# Patient Record
Sex: FEMALE | Race: WHITE | NOT HISPANIC OR LATINO | Employment: OTHER | URBAN - NONMETROPOLITAN AREA
[De-identification: names, ages, dates, MRNs, and addresses within clinical notes are randomized per-mention and may not be internally consistent; named-entity substitution may affect disease eponyms.]

---

## 2017-01-06 PROBLEM — F01.50 VASCULAR DEMENTIA (HCC): Status: ACTIVE | Noted: 2017-01-06

## 2017-02-15 ENCOUNTER — TRANSCRIBE ORDERS (OUTPATIENT)
Dept: ADMINISTRATIVE | Facility: HOSPITAL | Age: 72
End: 2017-02-15

## 2017-02-15 DIAGNOSIS — R13.10 DYSPHAGIA, UNSPECIFIED TYPE: Primary | ICD-10-CM

## 2017-02-17 ENCOUNTER — APPOINTMENT (OUTPATIENT)
Dept: GENERAL RADIOLOGY | Facility: HOSPITAL | Age: 72
End: 2017-02-17

## 2017-05-09 ENCOUNTER — HOSPITAL ENCOUNTER (INPATIENT)
Facility: HOSPITAL | Age: 72
LOS: 13 days | Discharge: NURSING, REHAB OR LONG-TERM CARE | End: 2017-05-22

## 2017-05-09 ENCOUNTER — APPOINTMENT (OUTPATIENT)
Dept: GENERAL RADIOLOGY | Facility: HOSPITAL | Age: 72
End: 2017-05-09

## 2017-05-09 ENCOUNTER — HOSPITAL ENCOUNTER (EMERGENCY)
Facility: HOSPITAL | Age: 72
Discharge: STILL ADMITTED OR ADMINISTRATIVE | End: 2017-05-09
Attending: EMERGENCY MEDICINE | Admitting: EMERGENCY MEDICINE

## 2017-05-09 VITALS
BODY MASS INDEX: 24.29 KG/M2 | SYSTOLIC BLOOD PRESSURE: 144 MMHG | TEMPERATURE: 98.7 F | DIASTOLIC BLOOD PRESSURE: 91 MMHG | WEIGHT: 132 LBS | RESPIRATION RATE: 18 BRPM | OXYGEN SATURATION: 100 % | HEART RATE: 74 BPM | HEIGHT: 62 IN

## 2017-05-09 DIAGNOSIS — F33.2 SEVERE EPISODE OF RECURRENT MAJOR DEPRESSIVE DISORDER, WITHOUT PSYCHOTIC FEATURES (HCC): Primary | ICD-10-CM

## 2017-05-09 LAB
6-ACETYL MORPHINE: NEGATIVE
ALBUMIN SERPL-MCNC: 3.6 G/DL (ref 3.4–4.8)
ALBUMIN/GLOB SERPL: 1.2 G/DL (ref 1.5–2.5)
ALP SERPL-CCNC: 52 U/L (ref 35–104)
ALT SERPL W P-5'-P-CCNC: 8 U/L (ref 10–36)
AMPHET+METHAMPHET UR QL: NEGATIVE
ANION GAP SERPL CALCULATED.3IONS-SCNC: 5.6 MMOL/L (ref 3.6–11.2)
AST SERPL-CCNC: 15 U/L (ref 10–30)
BACTERIA UR QL AUTO: ABNORMAL /HPF
BARBITURATES UR QL SCN: POSITIVE
BASOPHILS # BLD AUTO: 0.03 10*3/MM3 (ref 0–0.3)
BASOPHILS NFR BLD AUTO: 0.2 % (ref 0–2)
BENZODIAZ UR QL SCN: NEGATIVE
BILIRUB SERPL-MCNC: 0.2 MG/DL (ref 0.2–1.8)
BILIRUB UR QL STRIP: NEGATIVE
BUN BLD-MCNC: 13 MG/DL (ref 7–21)
BUN/CREAT SERPL: 18.6 (ref 7–25)
BUPRENORPHINE SERPL-MCNC: NEGATIVE NG/ML
CALCIUM SPEC-SCNC: 9.2 MG/DL (ref 7.7–10)
CANNABINOIDS SERPL QL: NEGATIVE
CHLORIDE SERPL-SCNC: 105 MMOL/L (ref 99–112)
CLARITY UR: ABNORMAL
CO2 SERPL-SCNC: 25.4 MMOL/L (ref 24.3–31.9)
COCAINE UR QL: NEGATIVE
COLOR UR: ABNORMAL
CREAT BLD-MCNC: 0.7 MG/DL (ref 0.43–1.29)
DEPRECATED RDW RBC AUTO: 47.5 FL (ref 37–54)
EOSINOPHIL # BLD AUTO: 0.08 10*3/MM3 (ref 0–0.7)
EOSINOPHIL NFR BLD AUTO: 0.6 % (ref 0–7)
ERYTHROCYTE [DISTWIDTH] IN BLOOD BY AUTOMATED COUNT: 14.5 % (ref 11.5–14.5)
GFR SERPL CREATININE-BSD FRML MDRD: 82 ML/MIN/1.73
GLOBULIN UR ELPH-MCNC: 3 GM/DL
GLUCOSE BLD-MCNC: 83 MG/DL (ref 70–110)
GLUCOSE UR STRIP-MCNC: NEGATIVE MG/DL
HCT VFR BLD AUTO: 38 % (ref 37–47)
HGB BLD-MCNC: 12.9 G/DL (ref 12–16)
HGB UR QL STRIP.AUTO: NEGATIVE
HYALINE CASTS UR QL AUTO: ABNORMAL /LPF
IMM GRANULOCYTES # BLD: 0.02 10*3/MM3 (ref 0–0.03)
IMM GRANULOCYTES NFR BLD: 0.2 % (ref 0–0.5)
KETONES UR QL STRIP: ABNORMAL
LEUKOCYTE ESTERASE UR QL STRIP.AUTO: ABNORMAL
LYMPHOCYTES # BLD AUTO: 5.14 10*3/MM3 (ref 1–3)
LYMPHOCYTES NFR BLD AUTO: 38.9 % (ref 16–46)
MCH RBC QN AUTO: 30.8 PG (ref 27–33)
MCHC RBC AUTO-ENTMCNC: 33.9 G/DL (ref 33–37)
MCV RBC AUTO: 90.7 FL (ref 80–94)
MDMA UR QL SCN: NEGATIVE
METHADONE UR QL SCN: NEGATIVE
MONOCYTES # BLD AUTO: 1.41 10*3/MM3 (ref 0.1–0.9)
MONOCYTES NFR BLD AUTO: 10.7 % (ref 0–12)
NEUTROPHILS # BLD AUTO: 6.53 10*3/MM3 (ref 1.4–6.5)
NEUTROPHILS NFR BLD AUTO: 49.4 % (ref 40–75)
NITRITE UR QL STRIP: NEGATIVE
OPIATES UR QL: NEGATIVE
OSMOLALITY SERPL CALC.SUM OF ELEC: 271.2 MOSM/KG (ref 273–305)
OXYCODONE UR QL SCN: NEGATIVE
PCP UR QL SCN: NEGATIVE
PH UR STRIP.AUTO: 7 [PH] (ref 5–8)
PLATELET # BLD AUTO: 412 10*3/MM3 (ref 130–400)
PMV BLD AUTO: 10.9 FL (ref 6–10)
POTASSIUM BLD-SCNC: 4.3 MMOL/L (ref 3.5–5.3)
PROT SERPL-MCNC: 6.6 G/DL (ref 6–8)
PROT UR QL STRIP: NEGATIVE
RBC # BLD AUTO: 4.19 10*6/MM3 (ref 4.2–5.4)
RBC # UR: ABNORMAL /HPF
REF LAB TEST METHOD: ABNORMAL
SODIUM BLD-SCNC: 136 MMOL/L (ref 135–153)
SP GR UR STRIP: 1.01 (ref 1–1.03)
SQUAMOUS #/AREA URNS HPF: ABNORMAL /HPF
TSH SERPL DL<=0.05 MIU/L-ACNC: 2.28 MIU/ML (ref 0.55–4.78)
UROBILINOGEN UR QL STRIP: ABNORMAL
WBC NRBC COR # BLD: 13.21 10*3/MM3 (ref 4.5–12.5)
WBC UR QL AUTO: ABNORMAL /HPF

## 2017-05-09 PROCEDURE — 71010 XR CHEST 1 VW: CPT | Performed by: RADIOLOGY

## 2017-05-09 RX ORDER — ACETAMINOPHEN 325 MG/1
500 TABLET ORAL EVERY 6 HOURS PRN
Status: DISCONTINUED | OUTPATIENT
Start: 2017-05-09 | End: 2017-05-13

## 2017-05-09 RX ORDER — AMLODIPINE BESYLATE 5 MG/1
5 TABLET ORAL DAILY
Status: DISCONTINUED | OUTPATIENT
Start: 2017-05-10 | End: 2017-05-22 | Stop reason: HOSPADM

## 2017-05-09 RX ORDER — PRIMIDONE 50 MG/1
25 TABLET ORAL 3 TIMES DAILY
Status: DISCONTINUED | OUTPATIENT
Start: 2017-05-09 | End: 2017-05-12

## 2017-05-09 RX ORDER — ONDANSETRON 4 MG/1
4 TABLET, FILM COATED ORAL EVERY 6 HOURS PRN
Status: DISCONTINUED | OUTPATIENT
Start: 2017-05-09 | End: 2017-05-22 | Stop reason: HOSPADM

## 2017-05-09 RX ORDER — MAGNESIUM HYDROXIDE/ALUMINUM HYDROXICE/SIMETHICONE 120; 1200; 1200 MG/30ML; MG/30ML; MG/30ML
30 SUSPENSION ORAL EVERY 6 HOURS PRN
Status: DISCONTINUED | OUTPATIENT
Start: 2017-05-09 | End: 2017-05-09 | Stop reason: CLARIF

## 2017-05-09 RX ORDER — BENZTROPINE MESYLATE 1 MG/1
1 TABLET ORAL DAILY PRN
Status: DISCONTINUED | OUTPATIENT
Start: 2017-05-09 | End: 2017-05-22 | Stop reason: HOSPADM

## 2017-05-09 RX ORDER — FAMOTIDINE 20 MG/1
20 TABLET, FILM COATED ORAL 2 TIMES DAILY PRN
Status: DISCONTINUED | OUTPATIENT
Start: 2017-05-09 | End: 2017-05-22 | Stop reason: HOSPADM

## 2017-05-09 RX ORDER — DOCUSATE SODIUM 100 MG/1
100 CAPSULE, LIQUID FILLED ORAL DAILY PRN
Status: ON HOLD | COMMUNITY
End: 2018-04-30

## 2017-05-09 RX ORDER — TRAZODONE HYDROCHLORIDE 50 MG/1
50 TABLET ORAL NIGHTLY PRN
Status: DISCONTINUED | OUTPATIENT
Start: 2017-05-09 | End: 2017-05-22 | Stop reason: HOSPADM

## 2017-05-09 RX ORDER — DIVALPROEX SODIUM 250 MG/1
250 TABLET, EXTENDED RELEASE ORAL EVERY 12 HOURS SCHEDULED
Status: DISCONTINUED | OUTPATIENT
Start: 2017-05-09 | End: 2017-05-12

## 2017-05-09 RX ORDER — BENZTROPINE MESYLATE 1 MG/ML
0.5 INJECTION INTRAMUSCULAR; INTRAVENOUS DAILY PRN
Status: DISCONTINUED | OUTPATIENT
Start: 2017-05-09 | End: 2017-05-22 | Stop reason: HOSPADM

## 2017-05-09 RX ORDER — NITROFURANTOIN MACROCRYSTALS 100 MG/1
100 CAPSULE ORAL 2 TIMES DAILY
COMMUNITY
Start: 2017-05-05 | End: 2017-05-22 | Stop reason: HOSPADM

## 2017-05-09 RX ORDER — LOPERAMIDE HYDROCHLORIDE 2 MG/1
2 CAPSULE ORAL 4 TIMES DAILY PRN
Status: DISCONTINUED | OUTPATIENT
Start: 2017-05-09 | End: 2017-05-22 | Stop reason: HOSPADM

## 2017-05-09 RX ORDER — HYDROXYZINE 50 MG/1
50 TABLET, FILM COATED ORAL EVERY 6 HOURS PRN
Status: DISCONTINUED | OUTPATIENT
Start: 2017-05-09 | End: 2017-05-22 | Stop reason: HOSPADM

## 2017-05-09 RX ORDER — MULTIVITAMIN
1 TABLET ORAL DAILY
Status: DISCONTINUED | OUTPATIENT
Start: 2017-05-09 | End: 2017-05-22 | Stop reason: HOSPADM

## 2017-05-09 RX ORDER — LORAZEPAM 1 MG/1
1 TABLET ORAL NIGHTLY
Status: DISCONTINUED | OUTPATIENT
Start: 2017-05-09 | End: 2017-05-13

## 2017-05-09 RX ORDER — LORAZEPAM 1 MG/1
1 TABLET ORAL NIGHTLY
Status: CANCELLED | OUTPATIENT
Start: 2017-05-09

## 2017-05-09 RX ORDER — LORAZEPAM 0.5 MG/1
0.5 TABLET ORAL 2 TIMES DAILY
COMMUNITY
End: 2017-12-12 | Stop reason: SDUPTHER

## 2017-05-09 RX ORDER — ACETAMINOPHEN 500 MG
500 TABLET ORAL EVERY 6 HOURS PRN
COMMUNITY
End: 2018-05-10 | Stop reason: HOSPADM

## 2017-05-09 RX ORDER — MEGESTROL ACETATE 40 MG/ML
400 SUSPENSION ORAL 2 TIMES DAILY
Status: ON HOLD | COMMUNITY
End: 2018-04-30

## 2017-05-09 RX ORDER — IBUPROFEN 400 MG/1
600 TABLET ORAL EVERY 6 HOURS PRN
Status: DISCONTINUED | OUTPATIENT
Start: 2017-05-09 | End: 2017-05-22 | Stop reason: HOSPADM

## 2017-05-09 RX ORDER — PRIMIDONE 50 MG/1
25 TABLET ORAL 3 TIMES DAILY
COMMUNITY
End: 2017-05-22 | Stop reason: HOSPADM

## 2017-05-09 RX ORDER — BENZONATATE 100 MG/1
100 CAPSULE ORAL 3 TIMES DAILY PRN
Status: DISCONTINUED | OUTPATIENT
Start: 2017-05-09 | End: 2017-05-22 | Stop reason: HOSPADM

## 2017-05-09 RX ORDER — LORAZEPAM 0.5 MG/1
0.5 TABLET ORAL 2 TIMES DAILY
Status: DISCONTINUED | OUTPATIENT
Start: 2017-05-10 | End: 2017-05-13

## 2017-05-09 RX ORDER — ALUMINA, MAGNESIA, AND SIMETHICONE 2400; 2400; 240 MG/30ML; MG/30ML; MG/30ML
15 SUSPENSION ORAL EVERY 6 HOURS PRN
Status: DISCONTINUED | OUTPATIENT
Start: 2017-05-09 | End: 2017-05-22 | Stop reason: HOSPADM

## 2017-05-09 RX ORDER — ASPIRIN 81 MG/1
81 TABLET, CHEWABLE ORAL DAILY
Status: ON HOLD | COMMUNITY
End: 2018-04-30

## 2017-05-09 RX ORDER — ASPIRIN 81 MG/1
81 TABLET, CHEWABLE ORAL DAILY
Status: DISCONTINUED | OUTPATIENT
Start: 2017-05-10 | End: 2017-05-22 | Stop reason: HOSPADM

## 2017-05-09 RX ORDER — NITROFURANTOIN MACROCRYSTALS 50 MG/1
100 CAPSULE ORAL 2 TIMES DAILY
Status: DISCONTINUED | OUTPATIENT
Start: 2017-05-09 | End: 2017-05-18

## 2017-05-09 RX ORDER — DONEPEZIL HYDROCHLORIDE 5 MG/1
10 TABLET, FILM COATED ORAL DAILY
Status: DISCONTINUED | OUTPATIENT
Start: 2017-05-10 | End: 2017-05-22 | Stop reason: HOSPADM

## 2017-05-09 RX ORDER — NITROFURANTOIN MACROCRYSTALS 50 MG/1
100 CAPSULE ORAL 2 TIMES DAILY
Status: CANCELLED | OUTPATIENT
Start: 2017-05-09 | End: 2017-05-12

## 2017-05-09 RX ORDER — LORAZEPAM 1 MG/1
1 TABLET ORAL NIGHTLY
COMMUNITY
End: 2017-12-12 | Stop reason: SDUPTHER

## 2017-05-09 RX ORDER — ECHINACEA PURPUREA EXTRACT 125 MG
2 TABLET ORAL AS NEEDED
Status: DISCONTINUED | OUTPATIENT
Start: 2017-05-09 | End: 2017-05-22 | Stop reason: HOSPADM

## 2017-05-09 RX ORDER — MEGESTROL ACETATE 40 MG/ML
400 SUSPENSION ORAL EVERY 12 HOURS SCHEDULED
Status: DISCONTINUED | OUTPATIENT
Start: 2017-05-09 | End: 2017-05-22 | Stop reason: HOSPADM

## 2017-05-09 RX ORDER — DONEPEZIL HYDROCHLORIDE 10 MG/1
10 TABLET, FILM COATED ORAL DAILY
Status: ON HOLD | COMMUNITY
Start: 2017-05-10 | End: 2018-04-30

## 2017-05-09 RX ORDER — DIVALPROEX SODIUM 250 MG/1
250 TABLET, EXTENDED RELEASE ORAL 2 TIMES DAILY
COMMUNITY
End: 2017-05-22 | Stop reason: HOSPADM

## 2017-05-09 RX ORDER — DOCUSATE SODIUM 100 MG/1
100 CAPSULE, LIQUID FILLED ORAL DAILY PRN
Status: DISCONTINUED | OUTPATIENT
Start: 2017-05-09 | End: 2017-05-22 | Stop reason: HOSPADM

## 2017-05-09 RX ORDER — DIVALPROEX SODIUM 250 MG/1
250 TABLET, DELAYED RELEASE ORAL 2 TIMES DAILY
Status: ON HOLD | COMMUNITY
End: 2017-05-09

## 2017-05-09 RX ADMIN — POLYVINYL ALCOHOL, POVIDONE 2 DROP: 14; 6 SOLUTION/ DROPS OPHTHALMIC at 20:17

## 2017-05-09 RX ADMIN — NITROFURANTOIN MACROCRYSTALS 100 MG: 50 CAPSULE ORAL at 17:23

## 2017-05-10 PROCEDURE — 99223 1ST HOSP IP/OBS HIGH 75: CPT | Performed by: PSYCHIATRY & NEUROLOGY

## 2017-05-10 PROCEDURE — 25010000002 LORAZEPAM PER 2 MG: Performed by: PSYCHIATRY & NEUROLOGY

## 2017-05-10 RX ORDER — LORAZEPAM 2 MG/ML
1 INJECTION INTRAMUSCULAR ONCE
Status: COMPLETED | OUTPATIENT
Start: 2017-05-10 | End: 2017-05-10

## 2017-05-10 RX ADMIN — LORAZEPAM 1 MG: 2 INJECTION INTRAMUSCULAR; INTRAVENOUS at 13:14

## 2017-05-10 RX ADMIN — LORAZEPAM 1 MG: 1 TABLET ORAL at 22:35

## 2017-05-10 RX ADMIN — SODIUM CHLORIDE 500 ML: 9 INJECTION, SOLUTION INTRAVENOUS at 15:00

## 2017-05-11 LAB — BACTERIA SPEC AEROBE CULT: NORMAL

## 2017-05-11 PROCEDURE — 99231 SBSQ HOSP IP/OBS SF/LOW 25: CPT | Performed by: PSYCHIATRY & NEUROLOGY

## 2017-05-11 RX ADMIN — DIVALPROEX SODIUM 250 MG: 250 TABLET, FILM COATED, EXTENDED RELEASE ORAL at 09:01

## 2017-05-11 RX ADMIN — APIXABAN 5 MG: 5 TABLET, FILM COATED ORAL at 21:04

## 2017-05-11 RX ADMIN — AMLODIPINE BESYLATE 5 MG: 5 TABLET ORAL at 07:22

## 2017-05-11 RX ADMIN — LORAZEPAM 0.5 MG: 0.5 TABLET ORAL at 07:22

## 2017-05-11 RX ADMIN — NITROFURANTOIN MACROCRYSTALS 100 MG: 50 CAPSULE ORAL at 09:01

## 2017-05-11 RX ADMIN — POLYVINYL ALCOHOL, POVIDONE 2 DROP: 14; 6 SOLUTION/ DROPS OPHTHALMIC at 21:04

## 2017-05-11 RX ADMIN — APIXABAN 5 MG: 5 TABLET, FILM COATED ORAL at 09:01

## 2017-05-11 RX ADMIN — LORAZEPAM 0.5 MG: 0.5 TABLET ORAL at 12:21

## 2017-05-11 RX ADMIN — DIVALPROEX SODIUM 250 MG: 250 TABLET, FILM COATED, EXTENDED RELEASE ORAL at 21:04

## 2017-05-11 RX ADMIN — PRIMIDONE 25 MG: 50 TABLET ORAL at 16:41

## 2017-05-11 RX ADMIN — PRIMIDONE 25 MG: 50 TABLET ORAL at 21:04

## 2017-05-11 RX ADMIN — LORAZEPAM 1 MG: 1 TABLET ORAL at 21:05

## 2017-05-11 RX ADMIN — PRIMIDONE 25 MG: 50 TABLET ORAL at 09:00

## 2017-05-11 RX ADMIN — MEGESTROL ACETATE 400 MG: 40 SUSPENSION ORAL at 21:04

## 2017-05-11 RX ADMIN — NITROFURANTOIN MACROCRYSTALS 100 MG: 50 CAPSULE ORAL at 17:48

## 2017-05-11 RX ADMIN — POLYVINYL ALCOHOL, POVIDONE 2 DROP: 14; 6 SOLUTION/ DROPS OPHTHALMIC at 16:40

## 2017-05-12 PROCEDURE — 99232 SBSQ HOSP IP/OBS MODERATE 35: CPT | Performed by: PSYCHIATRY & NEUROLOGY

## 2017-05-12 RX ORDER — PRIMIDONE 50 MG/1
25 TABLET ORAL NIGHTLY
Status: DISCONTINUED | OUTPATIENT
Start: 2017-05-12 | End: 2017-05-16

## 2017-05-12 RX ORDER — DIVALPROEX SODIUM 250 MG/1
250 TABLET, EXTENDED RELEASE ORAL NIGHTLY
Status: DISCONTINUED | OUTPATIENT
Start: 2017-05-12 | End: 2017-05-15

## 2017-05-12 RX ADMIN — POLYVINYL ALCOHOL, POVIDONE 2 DROP: 14; 6 SOLUTION/ DROPS OPHTHALMIC at 08:23

## 2017-05-12 RX ADMIN — MEGESTROL ACETATE 400 MG: 40 SUSPENSION ORAL at 08:21

## 2017-05-12 RX ADMIN — POLYVINYL ALCOHOL, POVIDONE 2 DROP: 14; 6 SOLUTION/ DROPS OPHTHALMIC at 15:15

## 2017-05-12 RX ADMIN — Medication 1 TABLET: at 08:23

## 2017-05-12 RX ADMIN — APIXABAN 5 MG: 5 TABLET, FILM COATED ORAL at 08:23

## 2017-05-12 RX ADMIN — LORAZEPAM 0.5 MG: 0.5 TABLET ORAL at 12:53

## 2017-05-12 RX ADMIN — DONEPEZIL HYDROCHLORIDE 10 MG: 5 TABLET, FILM COATED ORAL at 08:23

## 2017-05-12 RX ADMIN — AMLODIPINE BESYLATE 5 MG: 5 TABLET ORAL at 08:23

## 2017-05-12 RX ADMIN — PRIMIDONE 25 MG: 50 TABLET ORAL at 08:22

## 2017-05-12 RX ADMIN — ACETAMINOPHEN 487.5 MG: 325 TABLET, FILM COATED ORAL at 08:21

## 2017-05-12 RX ADMIN — NITROFURANTOIN MACROCRYSTALS 100 MG: 50 CAPSULE ORAL at 17:16

## 2017-05-12 RX ADMIN — ASPIRIN 81 MG: 81 TABLET, CHEWABLE ORAL at 08:23

## 2017-05-12 RX ADMIN — NITROFURANTOIN MACROCRYSTALS 100 MG: 50 CAPSULE ORAL at 08:22

## 2017-05-12 RX ADMIN — LORAZEPAM 0.5 MG: 0.5 TABLET ORAL at 08:22

## 2017-05-13 LAB
6-ACETYL MORPHINE: NEGATIVE
ALBUMIN SERPL-MCNC: 4 G/DL (ref 3.4–4.8)
ALBUMIN/GLOB SERPL: 1.2 G/DL (ref 1.5–2.5)
ALP SERPL-CCNC: 54 U/L (ref 35–104)
ALT SERPL W P-5'-P-CCNC: 12 U/L (ref 10–36)
AMPHET+METHAMPHET UR QL: NEGATIVE
ANION GAP SERPL CALCULATED.3IONS-SCNC: 11.5 MMOL/L (ref 3.6–11.2)
AST SERPL-CCNC: 18 U/L (ref 10–30)
BACTERIA UR QL AUTO: ABNORMAL /HPF
BARBITURATES UR QL SCN: POSITIVE
BASOPHILS # BLD AUTO: 0.05 10*3/MM3 (ref 0–0.3)
BASOPHILS NFR BLD AUTO: 0.4 % (ref 0–2)
BENZODIAZ UR QL SCN: NEGATIVE
BILIRUB SERPL-MCNC: 0.6 MG/DL (ref 0.2–1.8)
BILIRUB UR QL STRIP: NEGATIVE
BUN BLD-MCNC: 19 MG/DL (ref 7–21)
BUN/CREAT SERPL: 27.5 (ref 7–25)
BUPRENORPHINE SERPL-MCNC: NEGATIVE NG/ML
CALCIUM SPEC-SCNC: 9.6 MG/DL (ref 7.7–10)
CANNABINOIDS SERPL QL: NEGATIVE
CHLORIDE SERPL-SCNC: 103 MMOL/L (ref 99–112)
CK MB SERPL-CCNC: 0.59 NG/ML (ref 0–5)
CK MB SERPL-RTO: 0.4 % (ref 0–3)
CK SERPL-CCNC: 136 U/L (ref 24–173)
CLARITY UR: CLEAR
CO2 SERPL-SCNC: 21.5 MMOL/L (ref 24.3–31.9)
COCAINE UR QL: NEGATIVE
COLOR UR: ABNORMAL
CREAT BLD-MCNC: 0.69 MG/DL (ref 0.43–1.29)
CRP SERPL-MCNC: <0.5 MG/DL (ref 0–0.99)
D-LACTATE SERPL-SCNC: 1.4 MMOL/L (ref 0.5–2)
DEPRECATED RDW RBC AUTO: 46.4 FL (ref 37–54)
EOSINOPHIL # BLD AUTO: 0.04 10*3/MM3 (ref 0–0.7)
EOSINOPHIL NFR BLD AUTO: 0.4 % (ref 0–7)
ERYTHROCYTE [DISTWIDTH] IN BLOOD BY AUTOMATED COUNT: 14.4 % (ref 11.5–14.5)
GFR SERPL CREATININE-BSD FRML MDRD: 84 ML/MIN/1.73
GLOBULIN UR ELPH-MCNC: 3.3 GM/DL
GLUCOSE BLD-MCNC: 84 MG/DL (ref 70–110)
GLUCOSE UR STRIP-MCNC: NEGATIVE MG/DL
HCT VFR BLD AUTO: 41 % (ref 37–47)
HGB BLD-MCNC: 13.9 G/DL (ref 12–16)
HGB UR QL STRIP.AUTO: ABNORMAL
HYALINE CASTS UR QL AUTO: ABNORMAL /LPF
IMM GRANULOCYTES # BLD: 0.01 10*3/MM3 (ref 0–0.03)
IMM GRANULOCYTES NFR BLD: 0.1 % (ref 0–0.5)
KETONES UR QL STRIP: ABNORMAL
LEUKOCYTE ESTERASE UR QL STRIP.AUTO: ABNORMAL
LYMPHOCYTES # BLD AUTO: 4.18 10*3/MM3 (ref 1–3)
LYMPHOCYTES NFR BLD AUTO: 36.6 % (ref 16–46)
MAGNESIUM SERPL-MCNC: 2.1 MG/DL (ref 1.7–2.6)
MCH RBC QN AUTO: 30.8 PG (ref 27–33)
MCHC RBC AUTO-ENTMCNC: 33.9 G/DL (ref 33–37)
MCV RBC AUTO: 90.7 FL (ref 80–94)
MDMA UR QL SCN: NEGATIVE
METHADONE UR QL SCN: NEGATIVE
MONOCYTES # BLD AUTO: 1.53 10*3/MM3 (ref 0.1–0.9)
MONOCYTES NFR BLD AUTO: 13.4 % (ref 0–12)
MYOGLOBIN SERPL-MCNC: 43 NG/ML (ref 0–109)
NEUTROPHILS # BLD AUTO: 5.6 10*3/MM3 (ref 1.4–6.5)
NEUTROPHILS NFR BLD AUTO: 49.1 % (ref 40–75)
NITRITE UR QL STRIP: NEGATIVE
OPIATES UR QL: NEGATIVE
OSMOLALITY SERPL CALC.SUM OF ELEC: 273.4 MOSM/KG (ref 273–305)
OXYCODONE UR QL SCN: NEGATIVE
PCP UR QL SCN: NEGATIVE
PH UR STRIP.AUTO: 5.5 [PH] (ref 5–8)
PHOSPHATE SERPL-MCNC: 3.2 MG/DL (ref 2.7–4.5)
PLATELET # BLD AUTO: 373 10*3/MM3 (ref 130–400)
PMV BLD AUTO: 11 FL (ref 6–10)
POTASSIUM BLD-SCNC: 3.7 MMOL/L (ref 3.5–5.3)
PROT SERPL-MCNC: 7.3 G/DL (ref 6–8)
PROT UR QL STRIP: NEGATIVE
RBC # BLD AUTO: 4.52 10*6/MM3 (ref 4.2–5.4)
RBC # UR: ABNORMAL /HPF
REF LAB TEST METHOD: ABNORMAL
SODIUM BLD-SCNC: 136 MMOL/L (ref 135–153)
SP GR UR STRIP: 1.02 (ref 1–1.03)
SQUAMOUS #/AREA URNS HPF: ABNORMAL /HPF
TROPONIN I SERPL-MCNC: <0.006 NG/ML
UROBILINOGEN UR QL STRIP: ABNORMAL
WBC NRBC COR # BLD: 11.41 10*3/MM3 (ref 4.5–12.5)
WBC UR QL AUTO: ABNORMAL /HPF

## 2017-05-13 PROCEDURE — 80307 DRUG TEST PRSMV CHEM ANLYZR: CPT | Performed by: PHYSICIAN ASSISTANT

## 2017-05-13 PROCEDURE — 85025 COMPLETE CBC W/AUTO DIFF WBC: CPT | Performed by: INTERNAL MEDICINE

## 2017-05-13 PROCEDURE — 99223 1ST HOSP IP/OBS HIGH 75: CPT | Performed by: INTERNAL MEDICINE

## 2017-05-13 PROCEDURE — 87040 BLOOD CULTURE FOR BACTERIA: CPT | Performed by: INTERNAL MEDICINE

## 2017-05-13 PROCEDURE — 25010000002 LORAZEPAM PER 2 MG: Performed by: PSYCHIATRY & NEUROLOGY

## 2017-05-13 PROCEDURE — 83605 ASSAY OF LACTIC ACID: CPT | Performed by: INTERNAL MEDICINE

## 2017-05-13 PROCEDURE — 82550 ASSAY OF CK (CPK): CPT | Performed by: INTERNAL MEDICINE

## 2017-05-13 PROCEDURE — 82553 CREATINE MB FRACTION: CPT | Performed by: INTERNAL MEDICINE

## 2017-05-13 PROCEDURE — 93010 ELECTROCARDIOGRAM REPORT: CPT | Performed by: INTERNAL MEDICINE

## 2017-05-13 PROCEDURE — 81001 URINALYSIS AUTO W/SCOPE: CPT | Performed by: PSYCHIATRY & NEUROLOGY

## 2017-05-13 PROCEDURE — 84100 ASSAY OF PHOSPHORUS: CPT | Performed by: INTERNAL MEDICINE

## 2017-05-13 PROCEDURE — 86140 C-REACTIVE PROTEIN: CPT | Performed by: INTERNAL MEDICINE

## 2017-05-13 PROCEDURE — 83874 ASSAY OF MYOGLOBIN: CPT | Performed by: INTERNAL MEDICINE

## 2017-05-13 PROCEDURE — 99232 SBSQ HOSP IP/OBS MODERATE 35: CPT | Performed by: PSYCHIATRY & NEUROLOGY

## 2017-05-13 PROCEDURE — 84484 ASSAY OF TROPONIN QUANT: CPT | Performed by: INTERNAL MEDICINE

## 2017-05-13 PROCEDURE — 71010 HC CHEST PA OR AP: CPT

## 2017-05-13 PROCEDURE — 83735 ASSAY OF MAGNESIUM: CPT | Performed by: INTERNAL MEDICINE

## 2017-05-13 PROCEDURE — 80053 COMPREHEN METABOLIC PANEL: CPT | Performed by: INTERNAL MEDICINE

## 2017-05-13 PROCEDURE — 93005 ELECTROCARDIOGRAM TRACING: CPT | Performed by: INTERNAL MEDICINE

## 2017-05-13 PROCEDURE — 87086 URINE CULTURE/COLONY COUNT: CPT | Performed by: INTERNAL MEDICINE

## 2017-05-13 RX ORDER — LORAZEPAM 2 MG/ML
1 INJECTION INTRAMUSCULAR 3 TIMES DAILY
Status: DISCONTINUED | OUTPATIENT
Start: 2017-05-13 | End: 2017-05-16

## 2017-05-13 RX ORDER — ACETAMINOPHEN 325 MG/1
650 TABLET ORAL EVERY 6 HOURS PRN
Status: DISCONTINUED | OUTPATIENT
Start: 2017-05-13 | End: 2017-05-22 | Stop reason: HOSPADM

## 2017-05-13 RX ADMIN — POLYVINYL ALCOHOL, POVIDONE 2 DROP: 14; 6 SOLUTION/ DROPS OPHTHALMIC at 17:23

## 2017-05-13 RX ADMIN — LORAZEPAM 1 MG: 2 INJECTION INTRAMUSCULAR; INTRAVENOUS at 17:25

## 2017-05-13 RX ADMIN — SODIUM CHLORIDE 500 ML: 9 INJECTION, SOLUTION INTRAVENOUS at 12:00

## 2017-05-13 RX ADMIN — LORAZEPAM 0.5 MG: 0.5 TABLET ORAL at 07:53

## 2017-05-13 RX ADMIN — LORAZEPAM 1 MG: 2 INJECTION INTRAMUSCULAR; INTRAVENOUS at 11:28

## 2017-05-13 RX ADMIN — LORAZEPAM 1 MG: 2 INJECTION INTRAMUSCULAR; INTRAVENOUS at 22:32

## 2017-05-14 LAB
ALBUMIN SERPL-MCNC: 3.4 G/DL (ref 3.4–4.8)
ALBUMIN/GLOB SERPL: 1.1 G/DL (ref 1.5–2.5)
ALP SERPL-CCNC: 44 U/L (ref 35–104)
ALT SERPL W P-5'-P-CCNC: 11 U/L (ref 10–36)
ANION GAP SERPL CALCULATED.3IONS-SCNC: 11.7 MMOL/L (ref 3.6–11.2)
AST SERPL-CCNC: 16 U/L (ref 10–30)
BASOPHILS # BLD AUTO: 0.06 10*3/MM3 (ref 0–0.3)
BASOPHILS NFR BLD AUTO: 0.6 % (ref 0–2)
BILIRUB SERPL-MCNC: 0.4 MG/DL (ref 0.2–1.8)
BUN BLD-MCNC: 17 MG/DL (ref 7–21)
BUN/CREAT SERPL: 26.6 (ref 7–25)
CALCIUM SPEC-SCNC: 9 MG/DL (ref 7.7–10)
CHLORIDE SERPL-SCNC: 107 MMOL/L (ref 99–112)
CO2 SERPL-SCNC: 22.3 MMOL/L (ref 24.3–31.9)
CREAT BLD-MCNC: 0.64 MG/DL (ref 0.43–1.29)
DEPRECATED RDW RBC AUTO: 47.2 FL (ref 37–54)
EOSINOPHIL # BLD AUTO: 0.09 10*3/MM3 (ref 0–0.7)
EOSINOPHIL NFR BLD AUTO: 1 % (ref 0–7)
ERYTHROCYTE [DISTWIDTH] IN BLOOD BY AUTOMATED COUNT: 14.5 % (ref 11.5–14.5)
FOLATE SERPL-MCNC: 13.75 NG/ML (ref 5.4–20)
GFR SERPL CREATININE-BSD FRML MDRD: 91 ML/MIN/1.73
GLOBULIN UR ELPH-MCNC: 3 GM/DL
GLUCOSE BLD-MCNC: 72 MG/DL (ref 70–110)
HCT VFR BLD AUTO: 37.8 % (ref 37–47)
HGB BLD-MCNC: 12.5 G/DL (ref 12–16)
IMM GRANULOCYTES # BLD: 0 10*3/MM3 (ref 0–0.03)
IMM GRANULOCYTES NFR BLD: 0 % (ref 0–0.5)
LYMPHOCYTES # BLD AUTO: 4.75 10*3/MM3 (ref 1–3)
LYMPHOCYTES NFR BLD AUTO: 51.1 % (ref 16–46)
MAGNESIUM SERPL-MCNC: 2.2 MG/DL (ref 1.7–2.6)
MCH RBC QN AUTO: 30.6 PG (ref 27–33)
MCHC RBC AUTO-ENTMCNC: 33.1 G/DL (ref 33–37)
MCV RBC AUTO: 92.4 FL (ref 80–94)
MONOCYTES # BLD AUTO: 1.17 10*3/MM3 (ref 0.1–0.9)
MONOCYTES NFR BLD AUTO: 12.6 % (ref 0–12)
NEUTROPHILS # BLD AUTO: 3.22 10*3/MM3 (ref 1.4–6.5)
NEUTROPHILS NFR BLD AUTO: 34.7 % (ref 40–75)
OSMOLALITY SERPL CALC.SUM OF ELEC: 281.3 MOSM/KG (ref 273–305)
PHOSPHATE SERPL-MCNC: 3.3 MG/DL (ref 2.7–4.5)
PLATELET # BLD AUTO: 318 10*3/MM3 (ref 130–400)
PMV BLD AUTO: 11.6 FL (ref 6–10)
POTASSIUM BLD-SCNC: 3.7 MMOL/L (ref 3.5–5.3)
PROT SERPL-MCNC: 6.4 G/DL (ref 6–8)
RBC # BLD AUTO: 4.09 10*6/MM3 (ref 4.2–5.4)
SODIUM BLD-SCNC: 141 MMOL/L (ref 135–153)
VIT B12 BLD-MCNC: 1211 PG/ML (ref 211–911)
WBC NRBC COR # BLD: 9.29 10*3/MM3 (ref 4.5–12.5)

## 2017-05-14 PROCEDURE — 85025 COMPLETE CBC W/AUTO DIFF WBC: CPT | Performed by: PHYSICIAN ASSISTANT

## 2017-05-14 PROCEDURE — 80053 COMPREHEN METABOLIC PANEL: CPT | Performed by: PHYSICIAN ASSISTANT

## 2017-05-14 PROCEDURE — 84100 ASSAY OF PHOSPHORUS: CPT | Performed by: INTERNAL MEDICINE

## 2017-05-14 PROCEDURE — 82607 VITAMIN B-12: CPT | Performed by: INTERNAL MEDICINE

## 2017-05-14 PROCEDURE — 82746 ASSAY OF FOLIC ACID SERUM: CPT | Performed by: INTERNAL MEDICINE

## 2017-05-14 PROCEDURE — 83735 ASSAY OF MAGNESIUM: CPT | Performed by: INTERNAL MEDICINE

## 2017-05-14 PROCEDURE — 25010000002 LORAZEPAM PER 2 MG: Performed by: PSYCHIATRY & NEUROLOGY

## 2017-05-14 PROCEDURE — 99232 SBSQ HOSP IP/OBS MODERATE 35: CPT | Performed by: PSYCHIATRY & NEUROLOGY

## 2017-05-14 RX ADMIN — LORAZEPAM 1 MG: 2 INJECTION INTRAMUSCULAR; INTRAVENOUS at 10:00

## 2017-05-14 RX ADMIN — LORAZEPAM 1 MG: 2 INJECTION INTRAMUSCULAR; INTRAVENOUS at 21:11

## 2017-05-14 RX ADMIN — POLYVINYL ALCOHOL, POVIDONE 2 DROP: 14; 6 SOLUTION/ DROPS OPHTHALMIC at 15:00

## 2017-05-14 RX ADMIN — AMLODIPINE BESYLATE 5 MG: 5 TABLET ORAL at 17:47

## 2017-05-14 RX ADMIN — LORAZEPAM 1 MG: 2 INJECTION INTRAMUSCULAR; INTRAVENOUS at 16:00

## 2017-05-14 RX ADMIN — APIXABAN 5 MG: 5 TABLET, FILM COATED ORAL at 20:38

## 2017-05-14 RX ADMIN — PRIMIDONE 25 MG: 50 TABLET ORAL at 20:38

## 2017-05-14 RX ADMIN — NITROFURANTOIN MACROCRYSTALS 100 MG: 50 CAPSULE ORAL at 17:46

## 2017-05-14 RX ADMIN — DIVALPROEX SODIUM 250 MG: 250 TABLET, FILM COATED, EXTENDED RELEASE ORAL at 20:38

## 2017-05-14 RX ADMIN — MEGESTROL ACETATE 400 MG: 40 SUSPENSION ORAL at 20:38

## 2017-05-15 PROCEDURE — 99232 SBSQ HOSP IP/OBS MODERATE 35: CPT | Performed by: PSYCHIATRY & NEUROLOGY

## 2017-05-15 PROCEDURE — 25010000002 LORAZEPAM PER 2 MG: Performed by: PSYCHIATRY & NEUROLOGY

## 2017-05-15 RX ADMIN — MEGESTROL ACETATE 400 MG: 40 SUSPENSION ORAL at 09:59

## 2017-05-15 RX ADMIN — POLYVINYL ALCOHOL, POVIDONE 2 DROP: 14; 6 SOLUTION/ DROPS OPHTHALMIC at 16:37

## 2017-05-15 RX ADMIN — NITROFURANTOIN MACROCRYSTALS 100 MG: 50 CAPSULE ORAL at 09:44

## 2017-05-15 RX ADMIN — POLYVINYL ALCOHOL, POVIDONE 2 DROP: 14; 6 SOLUTION/ DROPS OPHTHALMIC at 09:59

## 2017-05-15 RX ADMIN — AMLODIPINE BESYLATE 5 MG: 5 TABLET ORAL at 09:44

## 2017-05-15 RX ADMIN — DONEPEZIL HYDROCHLORIDE 10 MG: 5 TABLET, FILM COATED ORAL at 09:44

## 2017-05-15 RX ADMIN — Medication 1 TABLET: at 09:44

## 2017-05-15 RX ADMIN — LORAZEPAM 1 MG: 2 INJECTION INTRAMUSCULAR; INTRAVENOUS at 09:44

## 2017-05-15 RX ADMIN — APIXABAN 5 MG: 5 TABLET, FILM COATED ORAL at 09:44

## 2017-05-15 RX ADMIN — ASPIRIN 81 MG: 81 TABLET, CHEWABLE ORAL at 09:44

## 2017-05-15 RX ADMIN — LORAZEPAM 1 MG: 2 INJECTION INTRAMUSCULAR; INTRAVENOUS at 20:29

## 2017-05-15 RX ADMIN — LORAZEPAM 1 MG: 2 INJECTION INTRAMUSCULAR; INTRAVENOUS at 16:37

## 2017-05-15 RX ADMIN — POLYVINYL ALCOHOL, POVIDONE 2 DROP: 14; 6 SOLUTION/ DROPS OPHTHALMIC at 20:30

## 2017-05-16 LAB — BACTERIA SPEC AEROBE CULT: NORMAL

## 2017-05-16 PROCEDURE — 25010000002 LORAZEPAM PER 2 MG: Performed by: PSYCHIATRY & NEUROLOGY

## 2017-05-16 RX ORDER — LORAZEPAM 2 MG/ML
1 INJECTION INTRAMUSCULAR EVERY 12 HOURS PRN
Status: DISCONTINUED | OUTPATIENT
Start: 2017-05-16 | End: 2017-05-22 | Stop reason: HOSPADM

## 2017-05-16 RX ORDER — LORAZEPAM 1 MG/1
1 TABLET ORAL EVERY 12 HOURS SCHEDULED
Status: DISCONTINUED | OUTPATIENT
Start: 2017-05-16 | End: 2017-05-22 | Stop reason: HOSPADM

## 2017-05-16 RX ADMIN — LORAZEPAM 1 MG: 2 INJECTION INTRAMUSCULAR; INTRAVENOUS at 09:25

## 2017-05-17 PROCEDURE — 25010000002 LORAZEPAM PER 2 MG: Performed by: PSYCHIATRY & NEUROLOGY

## 2017-05-17 PROCEDURE — 99231 SBSQ HOSP IP/OBS SF/LOW 25: CPT | Performed by: PSYCHIATRY & NEUROLOGY

## 2017-05-17 RX ADMIN — POLYVINYL ALCOHOL, POVIDONE 2 DROP: 14; 6 SOLUTION/ DROPS OPHTHALMIC at 09:02

## 2017-05-17 RX ADMIN — LORAZEPAM 1 MG: 2 INJECTION INTRAMUSCULAR; INTRAVENOUS at 02:06

## 2017-05-17 RX ADMIN — ACETAMINOPHEN 650 MG: 325 TABLET, FILM COATED ORAL at 17:48

## 2017-05-17 RX ADMIN — NITROFURANTOIN MACROCRYSTALS 100 MG: 50 CAPSULE ORAL at 09:03

## 2017-05-17 RX ADMIN — LORAZEPAM 1 MG: 1 TABLET ORAL at 09:06

## 2017-05-18 LAB
ALBUMIN SERPL-MCNC: 3.7 G/DL (ref 3.4–4.8)
ALBUMIN/GLOB SERPL: 1.3 G/DL (ref 1.5–2.5)
ALP SERPL-CCNC: 50 U/L (ref 35–104)
ALT SERPL W P-5'-P-CCNC: 9 U/L (ref 10–36)
ANION GAP SERPL CALCULATED.3IONS-SCNC: 6.3 MMOL/L (ref 3.6–11.2)
AST SERPL-CCNC: 13 U/L (ref 10–30)
BACTERIA SPEC AEROBE CULT: NORMAL
BACTERIA SPEC AEROBE CULT: NORMAL
BASOPHILS # BLD AUTO: 0.04 10*3/MM3 (ref 0–0.3)
BASOPHILS NFR BLD AUTO: 0.4 % (ref 0–2)
BILIRUB SERPL-MCNC: 0.5 MG/DL (ref 0.2–1.8)
BUN BLD-MCNC: 23 MG/DL (ref 7–21)
BUN/CREAT SERPL: 30.7 (ref 7–25)
CALCIUM SPEC-SCNC: 9.5 MG/DL (ref 7.7–10)
CHLORIDE SERPL-SCNC: 106 MMOL/L (ref 99–112)
CO2 SERPL-SCNC: 27.7 MMOL/L (ref 24.3–31.9)
CREAT BLD-MCNC: 0.75 MG/DL (ref 0.43–1.29)
DEPRECATED RDW RBC AUTO: 46.8 FL (ref 37–54)
EOSINOPHIL # BLD AUTO: 0.02 10*3/MM3 (ref 0–0.7)
EOSINOPHIL NFR BLD AUTO: 0.2 % (ref 0–7)
ERYTHROCYTE [DISTWIDTH] IN BLOOD BY AUTOMATED COUNT: 14.3 % (ref 11.5–14.5)
GFR SERPL CREATININE-BSD FRML MDRD: 76 ML/MIN/1.73
GLOBULIN UR ELPH-MCNC: 2.8 GM/DL
GLUCOSE BLD-MCNC: 76 MG/DL (ref 70–110)
HCT VFR BLD AUTO: 38.8 % (ref 37–47)
HGB BLD-MCNC: 13.2 G/DL (ref 12–16)
IMM GRANULOCYTES # BLD: 0.02 10*3/MM3 (ref 0–0.03)
IMM GRANULOCYTES NFR BLD: 0.2 % (ref 0–0.5)
LYMPHOCYTES # BLD AUTO: 2.94 10*3/MM3 (ref 1–3)
LYMPHOCYTES NFR BLD AUTO: 31.1 % (ref 16–46)
MCH RBC QN AUTO: 30.8 PG (ref 27–33)
MCHC RBC AUTO-ENTMCNC: 34 G/DL (ref 33–37)
MCV RBC AUTO: 90.7 FL (ref 80–94)
MONOCYTES # BLD AUTO: 1.08 10*3/MM3 (ref 0.1–0.9)
MONOCYTES NFR BLD AUTO: 11.4 % (ref 0–12)
NEUTROPHILS # BLD AUTO: 5.36 10*3/MM3 (ref 1.4–6.5)
NEUTROPHILS NFR BLD AUTO: 56.7 % (ref 40–75)
OSMOLALITY SERPL CALC.SUM OF ELEC: 281.8 MOSM/KG (ref 273–305)
PLATELET # BLD AUTO: 260 10*3/MM3 (ref 130–400)
PMV BLD AUTO: 11.3 FL (ref 6–10)
POTASSIUM BLD-SCNC: 4 MMOL/L (ref 3.5–5.3)
PROT SERPL-MCNC: 6.5 G/DL (ref 6–8)
RBC # BLD AUTO: 4.28 10*6/MM3 (ref 4.2–5.4)
SODIUM BLD-SCNC: 140 MMOL/L (ref 135–153)
WBC NRBC COR # BLD: 9.46 10*3/MM3 (ref 4.5–12.5)

## 2017-05-18 PROCEDURE — 99232 SBSQ HOSP IP/OBS MODERATE 35: CPT | Performed by: PSYCHIATRY & NEUROLOGY

## 2017-05-18 PROCEDURE — 25010000002 LORAZEPAM PER 2 MG: Performed by: PSYCHIATRY & NEUROLOGY

## 2017-05-18 PROCEDURE — 80053 COMPREHEN METABOLIC PANEL: CPT | Performed by: PSYCHIATRY & NEUROLOGY

## 2017-05-18 PROCEDURE — 99222 1ST HOSP IP/OBS MODERATE 55: CPT | Performed by: INTERNAL MEDICINE

## 2017-05-18 PROCEDURE — 85025 COMPLETE CBC W/AUTO DIFF WBC: CPT | Performed by: PSYCHIATRY & NEUROLOGY

## 2017-05-18 RX ADMIN — LORAZEPAM 1 MG: 2 INJECTION INTRAMUSCULAR; INTRAVENOUS at 10:14

## 2017-05-18 RX ADMIN — SODIUM CHLORIDE 500 ML: 9 INJECTION, SOLUTION INTRAVENOUS at 17:02

## 2017-05-19 LAB
BACTERIA UR QL AUTO: ABNORMAL /HPF
BILIRUB UR QL STRIP: NEGATIVE
CLARITY UR: ABNORMAL
COLOR UR: ABNORMAL
GLUCOSE UR STRIP-MCNC: NEGATIVE MG/DL
HGB UR QL STRIP.AUTO: NEGATIVE
HYALINE CASTS UR QL AUTO: ABNORMAL /LPF
KETONES UR QL STRIP: ABNORMAL
LEUKOCYTE ESTERASE UR QL STRIP.AUTO: ABNORMAL
NITRITE UR QL STRIP: NEGATIVE
PH UR STRIP.AUTO: 5.5 [PH] (ref 5–8)
PROT UR QL STRIP: NEGATIVE
RBC # UR: ABNORMAL /HPF
REF LAB TEST METHOD: ABNORMAL
SP GR UR STRIP: 1.02 (ref 1–1.03)
SQUAMOUS #/AREA URNS HPF: ABNORMAL /HPF
UROBILINOGEN UR QL STRIP: ABNORMAL
WBC UR QL AUTO: ABNORMAL /HPF

## 2017-05-19 PROCEDURE — G8978 MOBILITY CURRENT STATUS: HCPCS

## 2017-05-19 PROCEDURE — 25010000002 LORAZEPAM PER 2 MG: Performed by: PSYCHIATRY & NEUROLOGY

## 2017-05-19 PROCEDURE — 97530 THERAPEUTIC ACTIVITIES: CPT

## 2017-05-19 PROCEDURE — 97162 PT EVAL MOD COMPLEX 30 MIN: CPT

## 2017-05-19 PROCEDURE — 87086 URINE CULTURE/COLONY COUNT: CPT | Performed by: PSYCHIATRY & NEUROLOGY

## 2017-05-19 PROCEDURE — 71010 HC CHEST AP: CPT

## 2017-05-19 PROCEDURE — 87077 CULTURE AEROBIC IDENTIFY: CPT | Performed by: PSYCHIATRY & NEUROLOGY

## 2017-05-19 PROCEDURE — 99231 SBSQ HOSP IP/OBS SF/LOW 25: CPT | Performed by: PSYCHIATRY & NEUROLOGY

## 2017-05-19 PROCEDURE — G8979 MOBILITY GOAL STATUS: HCPCS

## 2017-05-19 PROCEDURE — 81001 URINALYSIS AUTO W/SCOPE: CPT | Performed by: PSYCHIATRY & NEUROLOGY

## 2017-05-19 PROCEDURE — 97116 GAIT TRAINING THERAPY: CPT

## 2017-05-19 RX ADMIN — POLYVINYL ALCOHOL, POVIDONE 2 DROP: 14; 6 SOLUTION/ DROPS OPHTHALMIC at 20:51

## 2017-05-19 RX ADMIN — LORAZEPAM 1 MG: 2 INJECTION INTRAMUSCULAR; INTRAVENOUS at 20:51

## 2017-05-19 RX ADMIN — LORAZEPAM 1 MG: 1 TABLET ORAL at 08:52

## 2017-05-20 PROCEDURE — 99232 SBSQ HOSP IP/OBS MODERATE 35: CPT

## 2017-05-20 PROCEDURE — 25010000002 LORAZEPAM PER 2 MG: Performed by: PSYCHIATRY & NEUROLOGY

## 2017-05-20 RX ADMIN — LORAZEPAM 1 MG: 2 INJECTION INTRAMUSCULAR; INTRAVENOUS at 08:55

## 2017-05-20 RX ADMIN — ACETAMINOPHEN 650 MG: 325 TABLET, FILM COATED ORAL at 10:37

## 2017-05-20 RX ADMIN — LORAZEPAM 1 MG: 1 TABLET ORAL at 20:35

## 2017-05-20 RX ADMIN — POLYVINYL ALCOHOL, POVIDONE 2 DROP: 14; 6 SOLUTION/ DROPS OPHTHALMIC at 17:16

## 2017-05-20 RX ADMIN — POLYVINYL ALCOHOL, POVIDONE 2 DROP: 14; 6 SOLUTION/ DROPS OPHTHALMIC at 20:35

## 2017-05-21 PROCEDURE — 99232 SBSQ HOSP IP/OBS MODERATE 35: CPT

## 2017-05-21 PROCEDURE — 25010000002 LORAZEPAM PER 2 MG: Performed by: PSYCHIATRY & NEUROLOGY

## 2017-05-21 RX ADMIN — LORAZEPAM 1 MG: 2 INJECTION INTRAMUSCULAR; INTRAVENOUS at 21:52

## 2017-05-21 RX ADMIN — ONDANSETRON 4 MG: 4 TABLET, FILM COATED ORAL at 12:43

## 2017-05-21 RX ADMIN — LORAZEPAM 1 MG: 2 INJECTION INTRAMUSCULAR; INTRAVENOUS at 09:30

## 2017-05-22 VITALS
DIASTOLIC BLOOD PRESSURE: 75 MMHG | TEMPERATURE: 97 F | WEIGHT: 124 LBS | HEIGHT: 58 IN | SYSTOLIC BLOOD PRESSURE: 128 MMHG | BODY MASS INDEX: 26.03 KG/M2 | RESPIRATION RATE: 18 BRPM | OXYGEN SATURATION: 98 % | HEART RATE: 72 BPM

## 2017-05-22 LAB — BACTERIA SPEC AEROBE CULT: NORMAL

## 2017-05-22 PROCEDURE — 97530 THERAPEUTIC ACTIVITIES: CPT

## 2017-05-22 PROCEDURE — G8978 MOBILITY CURRENT STATUS: HCPCS

## 2017-05-22 PROCEDURE — 25010000002 LORAZEPAM PER 2 MG: Performed by: PSYCHIATRY & NEUROLOGY

## 2017-05-22 PROCEDURE — 99222 1ST HOSP IP/OBS MODERATE 55: CPT | Performed by: PSYCHIATRY & NEUROLOGY

## 2017-05-22 PROCEDURE — G8980 MOBILITY D/C STATUS: HCPCS

## 2017-05-22 PROCEDURE — 99238 HOSP IP/OBS DSCHRG MGMT 30/<: CPT | Performed by: PSYCHIATRY & NEUROLOGY

## 2017-05-22 PROCEDURE — 97116 GAIT TRAINING THERAPY: CPT

## 2017-05-22 RX ADMIN — LORAZEPAM 1 MG: 2 INJECTION INTRAMUSCULAR; INTRAVENOUS at 09:15

## 2017-05-26 ENCOUNTER — OFFICE VISIT (OUTPATIENT)
Dept: PSYCHIATRY | Facility: CLINIC | Age: 72
End: 2017-05-26

## 2017-05-26 VITALS
SYSTOLIC BLOOD PRESSURE: 119 MMHG | HEIGHT: 58 IN | BODY MASS INDEX: 25.82 KG/M2 | HEART RATE: 100 BPM | DIASTOLIC BLOOD PRESSURE: 86 MMHG | WEIGHT: 123 LBS

## 2017-05-26 DIAGNOSIS — F01.518 VASCULAR DEMENTIA WITH BEHAVIOR DISTURBANCE (HCC): ICD-10-CM

## 2017-05-26 DIAGNOSIS — F33.2 SEVERE EPISODE OF RECURRENT MAJOR DEPRESSIVE DISORDER, WITHOUT PSYCHOTIC FEATURES (HCC): Primary | ICD-10-CM

## 2017-05-26 PROCEDURE — 90792 PSYCH DIAG EVAL W/MED SRVCS: CPT | Performed by: PSYCHIATRY & NEUROLOGY

## 2017-05-26 RX ORDER — BUPROPION HYDROCHLORIDE 75 MG/1
75 TABLET ORAL 2 TIMES DAILY
Qty: 60 TABLET | Status: SHIPPED | OUTPATIENT
Start: 2017-05-26 | End: 2017-08-08 | Stop reason: DRUGHIGH

## 2017-05-30 DIAGNOSIS — F01.518 VASCULAR DEMENTIA WITH BEHAVIOR DISTURBANCE (HCC): Primary | ICD-10-CM

## 2017-06-05 ENCOUNTER — TRANSCRIBE ORDERS (OUTPATIENT)
Dept: ADMINISTRATIVE | Facility: HOSPITAL | Age: 72
End: 2017-06-05

## 2017-06-05 ENCOUNTER — HOSPITAL ENCOUNTER (OUTPATIENT)
Dept: CARDIOLOGY | Facility: HOSPITAL | Age: 72
Discharge: HOME | End: 2017-06-05
Admitting: FAMILY MEDICINE

## 2017-06-05 ENCOUNTER — HOSPITAL ENCOUNTER (OUTPATIENT)
Dept: MRI IMAGING | Facility: HOSPITAL | Age: 72
Discharge: HOME | End: 2017-06-05
Attending: PSYCHIATRY & NEUROLOGY

## 2017-06-05 ENCOUNTER — HOSPITAL ENCOUNTER (OUTPATIENT)
Dept: CT IMAGING | Facility: HOSPITAL | Age: 72
Discharge: HOME | End: 2017-06-05

## 2017-06-05 DIAGNOSIS — R10.2 PELVIC PAIN: ICD-10-CM

## 2017-06-05 DIAGNOSIS — F01.518 VASCULAR DEMENTIA WITH BEHAVIOR DISTURBANCE (HCC): ICD-10-CM

## 2017-06-05 DIAGNOSIS — Z86.718 H/O BLOOD CLOTS: ICD-10-CM

## 2017-06-05 DIAGNOSIS — M79.89 LEG SWELLING: Primary | ICD-10-CM

## 2017-06-05 DIAGNOSIS — R10.9 ABDOMINAL PAIN, UNSPECIFIED LOCATION: ICD-10-CM

## 2017-06-05 DIAGNOSIS — M79.89 LEG SWELLING: ICD-10-CM

## 2017-06-05 PROCEDURE — 0 IOPAMIDOL 61 % SOLUTION: Performed by: FAMILY MEDICINE

## 2017-06-05 PROCEDURE — 74177 CT ABD & PELVIS W/CONTRAST: CPT

## 2017-06-05 PROCEDURE — 93971 EXTREMITY STUDY: CPT

## 2017-06-05 PROCEDURE — 74177 CT ABD & PELVIS W/CONTRAST: CPT | Performed by: RADIOLOGY

## 2017-06-05 PROCEDURE — 93971 EXTREMITY STUDY: CPT | Performed by: RADIOLOGY

## 2017-06-05 RX ADMIN — IOPAMIDOL 80 ML: 612 INJECTION, SOLUTION INTRAVENOUS at 12:15

## 2017-06-08 ENCOUNTER — HOSPITAL ENCOUNTER (OUTPATIENT)
Dept: MRI IMAGING | Facility: HOSPITAL | Age: 72
Discharge: HOME | End: 2017-06-08
Attending: PSYCHIATRY & NEUROLOGY

## 2017-06-08 ENCOUNTER — APPOINTMENT (OUTPATIENT)
Dept: CT IMAGING | Facility: HOSPITAL | Age: 72
End: 2017-06-08

## 2017-08-08 ENCOUNTER — OFFICE VISIT (OUTPATIENT)
Dept: PSYCHIATRY | Facility: CLINIC | Age: 72
End: 2017-08-08

## 2017-08-08 VITALS
BODY MASS INDEX: 28.13 KG/M2 | HEART RATE: 95 BPM | SYSTOLIC BLOOD PRESSURE: 120 MMHG | HEIGHT: 58 IN | WEIGHT: 134 LBS | DIASTOLIC BLOOD PRESSURE: 78 MMHG

## 2017-08-08 DIAGNOSIS — F41.1 GAD (GENERALIZED ANXIETY DISORDER): ICD-10-CM

## 2017-08-08 DIAGNOSIS — F33.2 SEVERE EPISODE OF RECURRENT MAJOR DEPRESSIVE DISORDER, WITHOUT PSYCHOTIC FEATURES (HCC): Primary | ICD-10-CM

## 2017-08-08 PROCEDURE — 99214 OFFICE O/P EST MOD 30 MIN: CPT | Performed by: PSYCHIATRY & NEUROLOGY

## 2017-08-08 RX ORDER — BUPROPION HYDROCHLORIDE 100 MG/1
100 TABLET, EXTENDED RELEASE ORAL 2 TIMES DAILY
Qty: 60 TABLET | Refills: 2 | Status: SHIPPED | OUTPATIENT
Start: 2017-08-08 | End: 2017-08-08 | Stop reason: SDUPTHER

## 2017-08-08 RX ORDER — ARIPIPRAZOLE 2 MG/1
2 TABLET ORAL DAILY
Qty: 30 TABLET | Refills: 0 | Status: SHIPPED | OUTPATIENT
Start: 2017-08-08 | End: 2017-12-12

## 2017-08-08 RX ORDER — BUPROPION HYDROCHLORIDE 100 MG/1
100 TABLET, EXTENDED RELEASE ORAL 2 TIMES DAILY
Qty: 60 TABLET | Refills: 2 | Status: SHIPPED | OUTPATIENT
Start: 2017-08-08 | End: 2017-12-12

## 2017-08-08 NOTE — PROGRESS NOTES
"Patient ID: Lacie Win is a 72 y.o. female    SERVICE TYPE: EVALUATION AND MANAGEMENT (greater than 50% of the time spent for supportive psychotherapy).  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     HPI: Rates her depression moderate severe, \"have hope\", restless sleep. \"Need more things to do\".   Depression improved. Dressing herself and tending to hygiene. Low grade paranoia coupled anxiety.   Memory is intact for recent events.   \"Don't trust my own judgement, probably couldn't live independently,but I want too\".   Could live with her son and his son at the home place when able.     Panic prohibited the MRI, need to fine an open MRI, but will hold on that for now. 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     So will slightly increase the Wellbutrin to 100 mg of the Wellbutrin SR twice a day, adding in Abilify 2 mg daily (no history of allergy checked), and continue the lorazepam 0.5 milligrams twice a day and 1 mg at bedtime.    PFSH: Residing at the [facility], copy of this note will be given to the patient to take to the nursing home.    Review of Systems   Respiratory: Positive for chest tightness and shortness of breath.    Cardiovascular: Negative.    Gastrointestinal:        Dysphagia (needs EGD)    Family to have patient's primary care physician arrange for consultation with gastroenterologist.  DVT both LE's that's clear since back on Eliquis.    SUPPORTIVE PSYCHOTHERAPY: continuing efforts to promote the therapeutic alliance, address the patient’s issues, and strengthen self awareness, insights, and coping skills.       Mental Status Exam  Appearance:  clean and casually dressed, " appropriate  Attitude toward clinician:  cooperative and agreeable   Speech:    Rate:  regular rate and rhythm   Volume: normal  Motor:  no abnormal movements present  Mood:  Mildly depressed  Affect:  Mildly restricted  Thought Processes:  linear, logical, and goal directed  Thought Content:  Normal   Feeling Hopeless: absent  Suicidal Thoughts:  absent  Homicidal Thoughts:  absent  Perceptual Disturbance: no perceptual disturbance  Attention and Concentration:  good  Insight and Judgement:  good  Memory:  memory appears to be intact for recent and remote events    LABS:   Lab Results (last 7 days)     ** No results found for the last 168 hours. **          MEDICATION ISSUES: Have discussed with the patient the medications Risks, Benefits, and Side effects including potential falls, possible impaired driving and  metabolic adversities among others. No medication side effects or related complaints today.     TREATMENT PLAN/GOALS: Continue supportive psychotherapy efforts and medications as indicated.     Current Outpatient Prescriptions   Medication Sig Dispense Refill   • acetaminophen (TYLENOL) 500 MG tablet Take 500 mg by mouth Every 6 (Six) Hours As Needed for Mild Pain (1-3).     • amLODIPine (NORVASC) 5 MG tablet Take 5 mg by mouth Daily.     • apixaban (ELIQUIS) 5 MG tablet tablet Take 5 mg by mouth 2 (Two) Times a Day.     • aspirin 81 MG chewable tablet Chew 81 mg Daily.     • docusate sodium (COLACE) 100 MG capsule Take 100 mg by mouth Daily As Needed for Constipation.     •       • LORazepam (ATIVAN) 0.5 MG tablet Take 0.5 mg by mouth 2 (Two) Times a Day. Q8AM & QNOON; 1 mg HS     • LORazepam (ATIVAN) 1 MG tablet Take 1 mg by mouth Every Night.     • magnesium hydroxide (MILK OF MAGNESIA) 2400 MG/10ML suspension suspension Take 30 mL by mouth Daily As Needed.     • megestrol (MEGACE) 40 MG/ML suspension Take 400 mg by mouth 2 (Two) Times a Day.     • polyethyl glycol-propyl glycol (SYSTANE) 0.4-0.3 %  solution ophthalmic solution Administer 2 drops to both eyes 3 (Three) Times a Day.     • ARIPiprazole (ABILIFY) 2 MG tablet Take 1 tablet by mouth Daily. 30 tablet 0   • buPROPion SR (WELLBUTRIN SR) 100 MG 12 hr tablet Take 1 tablet by mouth 2 (Two) Times a Day. 60 tablet 2     No current facility-administered medications for this visit.        COLLATERAL PSYCHOTHERAPEUTIC INTERVENTION: patient not interested in additional psychotherapy.     Encounter Diagnoses   Name Primary?   • Severe episode of recurrent major depressive disorder, without psychotic features Yes   • GERMAN (generalized anxiety disorder)        Return in about 4 weeks (around 9/5/2017).  Patient knows to call if symptoms worsen or fail to improve between appointments.

## 2017-10-09 ENCOUNTER — TELEPHONE (OUTPATIENT)
Dept: PSYCHIATRY | Facility: CLINIC | Age: 72
End: 2017-10-09

## 2017-10-09 NOTE — TELEPHONE ENCOUNTER
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 Still fearful. Visits home day from nursing home. \"Still won't do for herself\". Self depreciatory. Missed appointment in September. Has appointment November 1. Will stop the Abilify and continue the Wellbutrin, call prn.   "

## 2017-12-12 ENCOUNTER — OFFICE VISIT (OUTPATIENT)
Dept: PSYCHIATRY | Facility: CLINIC | Age: 72
End: 2017-12-12

## 2017-12-12 VITALS
BODY MASS INDEX: 29.39 KG/M2 | HEIGHT: 58 IN | DIASTOLIC BLOOD PRESSURE: 86 MMHG | SYSTOLIC BLOOD PRESSURE: 132 MMHG | HEART RATE: 103 BPM | WEIGHT: 140 LBS

## 2017-12-12 DIAGNOSIS — F41.1 GAD (GENERALIZED ANXIETY DISORDER): ICD-10-CM

## 2017-12-12 DIAGNOSIS — F33.2 SEVERE EPISODE OF RECURRENT MAJOR DEPRESSIVE DISORDER, WITHOUT PSYCHOTIC FEATURES (HCC): Primary | ICD-10-CM

## 2017-12-12 PROCEDURE — 99214 OFFICE O/P EST MOD 30 MIN: CPT | Performed by: PSYCHIATRY & NEUROLOGY

## 2017-12-12 RX ORDER — BUPROPION HYDROCHLORIDE 150 MG/1
150 TABLET, EXTENDED RELEASE ORAL 2 TIMES DAILY
Qty: 60 TABLET | Refills: 0 | Status: SHIPPED | OUTPATIENT
Start: 2017-12-12 | End: 2018-02-20

## 2017-12-12 RX ORDER — LORAZEPAM 1 MG/1
1 TABLET ORAL NIGHTLY
Qty: 30 TABLET | Refills: 0 | Status: SHIPPED | OUTPATIENT
Start: 2017-12-12 | End: 2018-02-20 | Stop reason: SDUPTHER

## 2017-12-12 RX ORDER — LORAZEPAM 0.5 MG/1
0.5 TABLET ORAL 2 TIMES DAILY
Qty: 60 TABLET | Refills: 0 | Status: SHIPPED | OUTPATIENT
Start: 2017-12-12 | End: 2018-02-20 | Stop reason: SDUPTHER

## 2017-12-12 NOTE — PROGRESS NOTES
"Patient ID: Lacie Win is a 72 y.o. female    SERVICE TYPE: EVALUATION AND MANAGEMENT (greater than 50% of the time spent for supportive psychotherapy).  Time in: 1405    Time out: 1445    /86  Pulse 103  Ht 147.3 cm (58\")  Wt 63.5 kg (140 lb)  BMI 29.26 kg/m2    ALLERGIES:  Ciprofloxacin; Lamotrigine; Penicillins; Risperidone and related; Seroquel [quetiapine fumarate]; Sulfa antibiotics; and Zoloft [sertraline hcl]    CC: \"Kind of scarred\"    FOLLOWED FOR:Depression/ possible earily progressive dementia.     HPI: Not doing any of her self care, says she can't even while doing it (even while walking, eating). Memory is good, certainly no progressive decline. Tries to isolate in the nursing home, \"get scarred around people\". Sleeps \"to much\".  Enjoys \"being around the kids\". Daughter believes the patient is depressed sighting: negative, anxious, won't go to [employer] (like the real person I was isn't there\"), \"hope there's hope, been a long time\" almost a year now. \"Seems like I just can't get back with my family, like I'm away from them and can't get back\". \"sometime wish god would take me but not going to hurt myself\".     Need that Cranial MRI. Then possibly consider ECT? Referral made to the [facility] open [facility].     PFSH: still at the [facility] out most everyday with family. Requiring because she won't tend to necessities.     Review of Systems   Respiratory: Negative.    Cardiovascular: Negative.    Gastrointestinal: Negative.    Neurological: Negative.        SUPPORTIVE PSYCHOTHERAPY: continuing efforts to promote the therapeutic alliance, address the patient’s issues, and strengthen self awareness, insights, and coping skills.  Thoughts that she is unable to even do basic functions is almost to a delusional extent. Daughter requesting individual counseling.      Mental Status Exam  Appearance:  clean and casually dressed, appropriate  Attitude toward clinician:  cooperative and " agreeable   Speech:    Rate:  regular rate and rhythm   Volume: normal  Motor:  no abnormal movements present  Mood:  Anxious  Affect:  Euthymic cross sectionally.   Thought Processes:  linear, logical, and goal directed  Thought Content:  Normal   Feeling Hopeless: absent  Suicidal Thoughts:  absent  Homicidal Thoughts:  absent  Perceptual Disturbance: no perceptual disturbance  Attention and Concentration:  good  Insight and Judgement:  good  Memory:  memory appears to be intact    LABS: No results found for this or any previous visit (from the past 168 hour(s)).    MEDICATION ISSUES: Have discussed with the patient the medications Risks, Benefits, and Side effects including potential falls, possible impaired driving and  metabolic adversities among others. No medication side effects or related complaints today.     TREATMENT PLAN/GOALS: Continue supportive psychotherapy efforts and medications as indicated.     Current Outpatient Prescriptions   Medication Sig Dispense Refill   • acetaminophen (TYLENOL) 500 MG tablet Take 500 mg by mouth Every 6 (Six) Hours As Needed for Mild Pain (1-3).     • amLODIPine (NORVASC) 5 MG tablet Take 5 mg by mouth Daily.     • apixaban (ELIQUIS) 5 MG tablet tablet Take 5 mg by mouth 2 (Two) Times a Day.     • aspirin 81 MG chewable tablet Chew 81 mg Daily.     • buPROPion SR (WELLBUTRIN SR) 150 MG 12 hr tablet Take 1 tablet by mouth 2 (Two) Times a Day. 60 tablet 0   • docusate sodium (COLACE) 100 MG capsule Take 100 mg by mouth Daily As Needed for Constipation.     • donepezil (ARICEPT) 10 MG tablet Take 10 mg by mouth Daily.     • LORazepam (ATIVAN) 0.5 MG tablet Take 1 tablet by mouth 2 (Two) Times a Day. Q8AM & QNOON; 1 mg HS 60 tablet 0   • LORazepam (ATIVAN) 1 MG tablet Take 1 tablet by mouth Every Night. 30 tablet 0   • magnesium hydroxide (MILK OF MAGNESIA) 2400 MG/10ML suspension suspension Take 30 mL by mouth Daily As Needed.     • megestrol (MEGACE) 40 MG/ML suspension  Take 400 mg by mouth 2 (Two) Times a Day.     • polyethyl glycol-propyl glycol (SYSTANE) 0.4-0.3 % solution ophthalmic solution Administer 2 drops to both eyes 3 (Three) Times a Day.       No current facility-administered medications for this visit.        COLLATERAL PSYCHOTHERAPEUTIC INTERVENTION: patient not interested in additional psychotherapy.     Encounter Diagnoses   Name Primary?   • Severe episode of recurrent major depressive disorder, without psychotic features Yes   • GERMAN (generalized anxiety disorder)        Return in about 4 weeks (around 1/9/2018).  Patient knows to call if symptoms worsen or fail to improve between appointments.

## 2017-12-13 ENCOUNTER — TELEPHONE (OUTPATIENT)
Dept: PSYCHIATRY | Facility: CLINIC | Age: 72
End: 2017-12-13

## 2017-12-13 ENCOUNTER — DOCUMENTATION (OUTPATIENT)
Dept: PSYCHIATRY | Facility: CLINIC | Age: 72
End: 2017-12-13

## 2017-12-13 NOTE — TELEPHONE ENCOUNTER
Katherine from [facility] inregards of the Ativan states she was on taking Ativan 0.5 Mg taking 1 8 am 1 noon and 1 at 4pm and then 1 MG at bed times, States the script you sent in yesterday you had writing the script for 1 tab at 8 am and 1 tab at noon and then 1 MG at bed time. States she is needing to know if you dont want her taking the Ativan at 4 PM anymore? Please advise

## 2017-12-13 NOTE — PROGRESS NOTES
Patient needs to have a comprehensive metabolic panel to facilitate the MRI study with contrast.  Call order for a CMP to the [facility] ([phone]).  Advised him to fax results per instructions of the family to the [facility].

## 2017-12-15 ENCOUNTER — TELEPHONE (OUTPATIENT)
Dept: PSYCHIATRY | Facility: CLINIC | Age: 72
End: 2017-12-15

## 2017-12-15 NOTE — TELEPHONE ENCOUNTER
Talk to the [facility] telling them that the nursing home was post to have the chemistry studies done which included creatinine on December 13.

## 2018-01-31 ENCOUNTER — TELEPHONE (OUTPATIENT)
Dept: PSYCHIATRY | Facility: CLINIC | Age: 73
End: 2018-01-31

## 2018-02-01 ENCOUNTER — DOCUMENTATION (OUTPATIENT)
Dept: PSYCHIATRY | Facility: CLINIC | Age: 73
End: 2018-02-01

## 2018-02-01 NOTE — PROGRESS NOTES
Patient's daughter Mary Ellen Fallon ([phone]) called wanting to discuss the results of the patient's MRI scan done January 25, 2018 at the [facility].  Report scanned into the chart.  Advised the daughter that the MRI results did not really clarify the issue of dementia versus pseudodementia related to depression, certainly cleared the issue about the absence of any intracranial ischemia hemorrhage or tumor.  Will discuss treatment options with the patient's appointment February 20.  This was a noncontrast study.

## 2018-02-20 ENCOUNTER — TELEPHONE (OUTPATIENT)
Dept: PSYCHIATRY | Facility: CLINIC | Age: 73
End: 2018-02-20

## 2018-02-20 ENCOUNTER — OFFICE VISIT (OUTPATIENT)
Dept: PSYCHIATRY | Facility: CLINIC | Age: 73
End: 2018-02-20

## 2018-02-20 VITALS
SYSTOLIC BLOOD PRESSURE: 139 MMHG | BODY MASS INDEX: 27.92 KG/M2 | HEIGHT: 58 IN | DIASTOLIC BLOOD PRESSURE: 89 MMHG | WEIGHT: 133 LBS | HEART RATE: 90 BPM

## 2018-02-20 DIAGNOSIS — Z79.899 MEDICATION MANAGEMENT: Primary | ICD-10-CM

## 2018-02-20 DIAGNOSIS — F33.2 SEVERE EPISODE OF RECURRENT MAJOR DEPRESSIVE DISORDER, WITHOUT PSYCHOTIC FEATURES (HCC): ICD-10-CM

## 2018-02-20 LAB
AMPHETAMINE CUT-OFF: 1000
BENZODIAZIPINE CUT-OFF: 300
BUPRENORPHINE CUT-OFF: 10
COCAINE CUT-OFF: 300
EXTERNAL AMPHETAMINE SCREEN URINE: NEGATIVE
EXTERNAL BENZODIAZEPINE SCREEN URINE: POSITIVE
EXTERNAL BUPRENORPHINE SCREEN URINE: NEGATIVE
EXTERNAL COCAINE SCREEN URINE: NEGATIVE
EXTERNAL MDMA: NEGATIVE
EXTERNAL METHADONE SCREEN URINE: NEGATIVE
EXTERNAL METHAMPHETAMINE SCREEN URINE: NEGATIVE
EXTERNAL OPIATES SCREEN URINE: NEGATIVE
EXTERNAL OXYCODONE SCREEN URINE: NEGATIVE
EXTERNAL THC SCREEN URINE: NEGATIVE
MDMA CUT-OFF: 500
METHADONE CUT-OFF: 300
METHAMPHETAMINE CUT-OFF: 1000
OPIATES CUT-OFF: 300
OXYCODONE CUT-OFF: 100
THC CUT-OFF: 50

## 2018-02-20 PROCEDURE — 99214 OFFICE O/P EST MOD 30 MIN: CPT | Performed by: PSYCHIATRY & NEUROLOGY

## 2018-02-20 RX ORDER — LORAZEPAM 1 MG/1
1 TABLET ORAL NIGHTLY
Qty: 30 TABLET | Refills: 0 | Status: SHIPPED | OUTPATIENT
Start: 2018-02-20 | End: 2018-11-20 | Stop reason: SDUPTHER

## 2018-02-20 RX ORDER — LORAZEPAM 0.5 MG/1
0.5 TABLET ORAL 3 TIMES DAILY
Qty: 60 TABLET | Refills: 0 | Status: ON HOLD | OUTPATIENT
Start: 2018-02-20 | End: 2018-04-30

## 2018-02-20 RX ORDER — VENLAFAXINE 37.5 MG/1
37.5 TABLET ORAL 2 TIMES DAILY
Qty: 60 TABLET | Refills: 2 | Status: ON HOLD | OUTPATIENT
Start: 2018-02-20 | End: 2018-04-30

## 2018-02-20 RX ORDER — BUPROPION HYDROCHLORIDE 200 MG/1
200 TABLET, EXTENDED RELEASE ORAL EVERY 12 HOURS SCHEDULED
Qty: 60 TABLET | Refills: 0 | Status: ON HOLD | OUTPATIENT
Start: 2018-02-20 | End: 2018-04-30

## 2018-02-20 NOTE — TELEPHONE ENCOUNTER
Called In Wellbutrin  MG #[phone] refills into [facility] spoke with Jesus

## 2018-02-20 NOTE — PROGRESS NOTES
"Patient ID: Lacie Win is a 72 y.o. female    SERVICE TYPE: EVALUATION AND MANAGEMENT (greater than 50% of the time spent for supportive psychotherapy).  Time in: 1110    time out 1146    /89  Pulse 90  Ht 147.3 cm (58\")  Wt 60.3 kg (133 lb)  BMI 27.8 kg/m2  Wt lost 14 lbs past 2 months, relates to lack of appetite.     ALLERGIES:  Ciprofloxacin; Lamotrigine; Penicillins; Risperidone and related; Seroquel [quetiapine fumarate]; Sulfa antibiotics; and Zoloft [sertraline hcl]    CC: \"Seems like I can't read or anything\".     FOLLOWED FOR: Depression    HPI: No change in the nursing home situation with all most daily visits with family in and out of the facility. No improvement of ADL's. Interaction with family fairly normal. Patient \"want to stay away from people\". Depressed. \"Would like to get better, want to feel like there is hope\". Attended [facility] last week. Restless sleep, irregular pattern. No SI. \"Don't understand what's happening, don't think good, can't communicate with people\". \"Feel inadequate, a burdon\". \"Think I can't walk or go to the BR but do\".   Recalls events from yesterday.  Definitely no progressive cognitive deterioration noted.  Seems no significant change of status for better or worse except for the lack of appetite.   Discussed treatment options with the patient and her daughter.  Family has pushed for ECT but would like to complete medication trials before proceeding along with that.  The daughter remarks how the patient's mother had responded favorably the ECT.  Patient has been tried on multiple antidepressants, will complete the trial on Wellbutrin and start a trial with venlafaxine.  Patient contracts for safety, family to keep a close watch on her status and call when necessary.    PFSH: still in nursing home not wanting to leave or \"be there either\".     Review of Systems   Respiratory: Positive for shortness of breath.    Cardiovascular: Negative.    Gastrointestinal: " Negative.    Musculoskeletal: Negative.    Neurological: Negative.        SUPPORTIVE PSYCHOTHERAPY: continuing efforts to promote the therapeutic alliance, address the patient’s issues, and strengthen self awareness, insights, and coping skills.       Mental Status Exam  Appearance:  clean and casually dressed, appropriate  Attitude toward clinician:  cooperative and agreeable   Speech:    Rate:  regular rate and rhythm   Volume: normal  Motor:  no abnormal movements present  Mood:  Good  Affect:  euthymic  Thought Processes:  linear, logical, and goal directed  Thought Content:  Normal   Feeling Hopeless: absent  Suicidal Thoughts:  absent  Homicidal Thoughts:  absent  Perceptual Disturbance: no perceptual disturbance  Attention and Concentration:  good  Insight and Judgement:  good  Memory:  memory appears to be intact    LABS:   Recent Results (from the past 168 hour(s))   [facility] Drug Screen    Collection Time: 02/20/18 11:11 AM   Result Value Ref Range    External Amphetamine Screen Urine Negative     Amphetamine Cut-Off 1000     External Benzodiazepine Screen Urine Positive     Benzodiazipine Cut-Off 300     External Cocaine Screen Urine Negative     Cocaine Cut-Off 300     External THC Screen Urine Negative     THC Cut-Off 50     External Methadone Screen Urine Negative     Methadone Cut-Off 300     External Methamphetamine Screen Urine Negative     Methamphetamine Cut-Off 1000     External Oxycodone Screen Urine Negative     Oxycodone Cut-Off 100     External Buprenorphine Screen Urine Negative     Buprenorphine Cut-Off 10     External MDMA Negative     MDMA Cut-Off 500     External Opiates Screen Urine Negative     Opiates Cut-Off 300        MEDICATION ISSUES: Have discussed with the patient the medications Risks, Benefits, and Side effects including potential falls, possible impaired driving and  metabolic adversities among others. No medication side effects or related complaints today.     TREATMENT  PLAN/GOALS: Continue supportive psychotherapy efforts and medications as indicated.     Current Outpatient Prescriptions   Medication Sig Dispense Refill   • amLODIPine (NORVASC) 5 MG tablet Take 5 mg by mouth Daily.     • apixaban (ELIQUIS) 5 MG tablet tablet Take 5 mg by mouth 2 (Two) Times a Day.     • aspirin 81 MG chewable tablet Chew 81 mg Daily.     • docusate sodium (COLACE) 100 MG capsule Take 100 mg by mouth Daily As Needed for Constipation.     • LORazepam (ATIVAN) 0.5 MG tablet Take 1 tablet by mouth 3 (Three) Times a Day. Q8AM & QNOON; 1 mg HS 60 tablet 0   • LORazepam (ATIVAN) 1 MG tablet Take 1 tablet by mouth Every Night. 30 tablet 0   • magnesium hydroxide (MILK OF MAGNESIA) 2400 MG/10ML suspension suspension Take 30 mL by mouth Daily As Needed.     • megestrol (MEGACE) 40 MG/ML suspension Take 400 mg by mouth 2 (Two) Times a Day.     • polyethyl glycol-propyl glycol (SYSTANE) 0.4-0.3 % solution ophthalmic solution Administer 2 drops to both eyes 3 (Three) Times a Day.     • acetaminophen (TYLENOL) 500 MG tablet Take 500 mg by mouth Every 6 (Six) Hours As Needed for Mild Pain (1-3).     • buPROPion SR (WELLBUTRIN SR) 200 MG 12 hr tablet Take 1 tablet by mouth Every 12 (Twelve) Hours. 60 tablet 0   • donepezil (ARICEPT) 10 MG tablet Take 10 mg by mouth Daily.     • venlafaxine (EFFEXOR) 37.5 MG tablet Take 1 tablet by mouth 2 (Two) Times a Day. 60 tablet 2     No current facility-administered medications for this visit.        COLLATERAL PSYCHOTHERAPEUTIC INTERVENTION: patient not interested in additional psychotherapy.     Encounter Diagnoses   Name Plan    • Medication management To facilitate UDS    • Severe episode of recurrent major depressive disorder, without psychotic features See discussion above in the interval note        Return in about 4 weeks (around 3/20/2018).  Patient knows to call if symptoms worsen or fail to improve between appointments.

## 2018-03-15 ENCOUNTER — TELEPHONE (OUTPATIENT)
Dept: PSYCHIATRY | Facility: CLINIC | Age: 73
End: 2018-03-15

## 2018-03-15 NOTE — TELEPHONE ENCOUNTER
Patients daughter called stating the new medication you put her on she has gotting worse states she is having panic attacks, crying, the daughter would like for you to give her a call if possible, Mary Ellen is reachable at [phone]   Pt in for a Depo injection today.  She is within the time frame for next injection.  Pt last had a wellness exam on 2/6/17.  OK to place order for 1 year of depo?  Will place orders when pt arrives today.

## 2018-03-16 ENCOUNTER — TELEPHONE (OUTPATIENT)
Dept: PSYCHIATRY | Facility: CLINIC | Age: 73
End: 2018-03-16

## 2018-03-16 ENCOUNTER — DOCUMENTATION (OUTPATIENT)
Dept: PSYCHIATRY | Facility: CLINIC | Age: 73
End: 2018-03-16

## 2018-03-16 NOTE — TELEPHONE ENCOUNTER
Attempted to call the patient's daughter back, had to leave a message. Patient has an appointment 3.20

## 2018-03-16 NOTE — PROGRESS NOTES
Daughter called back: More paranoia, not eating, depressed. Lingering question, will hold back the Venlafaxine till appointment next week.

## 2018-04-30 ENCOUNTER — APPOINTMENT (OUTPATIENT)
Dept: GENERAL RADIOLOGY | Facility: HOSPITAL | Age: 73
End: 2018-04-30

## 2018-04-30 ENCOUNTER — OFFICE VISIT (OUTPATIENT)
Dept: PSYCHIATRY | Facility: CLINIC | Age: 73
End: 2018-04-30

## 2018-04-30 ENCOUNTER — HOSPITAL ENCOUNTER (INPATIENT)
Facility: HOSPITAL | Age: 73
LOS: 10 days | Discharge: NURSING, REHAB OR LONG-TERM CARE | End: 2018-05-10
Attending: PSYCHIATRY & NEUROLOGY | Admitting: PSYCHIATRY & NEUROLOGY

## 2018-04-30 VITALS
SYSTOLIC BLOOD PRESSURE: 140 MMHG | HEIGHT: 58 IN | BODY MASS INDEX: 27.29 KG/M2 | DIASTOLIC BLOOD PRESSURE: 92 MMHG | HEART RATE: 92 BPM | WEIGHT: 130 LBS

## 2018-04-30 DIAGNOSIS — F33.2 SEVERE EPISODE OF RECURRENT MAJOR DEPRESSIVE DISORDER, WITHOUT PSYCHOTIC FEATURES (HCC): Primary | ICD-10-CM

## 2018-04-30 DIAGNOSIS — F41.1 GAD (GENERALIZED ANXIETY DISORDER): ICD-10-CM

## 2018-04-30 DIAGNOSIS — R04.2 HEMOPTYSIS: ICD-10-CM

## 2018-04-30 PROBLEM — I82.409 DVT (DEEP VENOUS THROMBOSIS) (HCC): Status: ACTIVE | Noted: 2018-04-30

## 2018-04-30 PROBLEM — H04.123 DRY EYES: Status: ACTIVE | Noted: 2018-04-30

## 2018-04-30 PROBLEM — Z94.81 BONE MARROW TRANSPLANT STATUS (HCC): Status: ACTIVE | Noted: 2018-04-30

## 2018-04-30 PROBLEM — I10 HTN (HYPERTENSION): Status: ACTIVE | Noted: 2018-04-30

## 2018-04-30 LAB
6-ACETYL MORPHINE: NEGATIVE
ALBUMIN SERPL-MCNC: 4.5 G/DL (ref 3.4–4.8)
ALBUMIN/GLOB SERPL: 1.5 G/DL (ref 1.5–2.5)
ALP SERPL-CCNC: 90 U/L (ref 35–104)
ALT SERPL W P-5'-P-CCNC: 14 U/L (ref 10–36)
AMPHET+METHAMPHET UR QL: NEGATIVE
ANION GAP SERPL CALCULATED.3IONS-SCNC: 7.3 MMOL/L (ref 3.6–11.2)
AST SERPL-CCNC: 21 U/L (ref 10–30)
BACTERIA UR QL AUTO: ABNORMAL /HPF
BARBITURATES UR QL SCN: NEGATIVE
BASOPHILS # BLD AUTO: 0.03 10*3/MM3 (ref 0–0.3)
BASOPHILS NFR BLD AUTO: 0.4 % (ref 0–2)
BENZODIAZ UR QL SCN: NEGATIVE
BILIRUB SERPL-MCNC: 0.4 MG/DL (ref 0.2–1.8)
BILIRUB UR QL STRIP: NEGATIVE
BUN BLD-MCNC: 16 MG/DL (ref 7–21)
BUN/CREAT SERPL: 16.2 (ref 7–25)
BUPRENORPHINE SERPL-MCNC: NEGATIVE NG/ML
CALCIUM SPEC-SCNC: 9.6 MG/DL (ref 7.7–10)
CANNABINOIDS SERPL QL: NEGATIVE
CHLORIDE SERPL-SCNC: 104 MMOL/L (ref 99–112)
CLARITY UR: CLEAR
CO2 SERPL-SCNC: 27.7 MMOL/L (ref 24.3–31.9)
COCAINE UR QL: NEGATIVE
COLOR UR: YELLOW
CREAT BLD-MCNC: 0.99 MG/DL (ref 0.43–1.29)
DEPRECATED RDW RBC AUTO: 48 FL (ref 37–54)
EOSINOPHIL # BLD AUTO: 0.03 10*3/MM3 (ref 0–0.7)
EOSINOPHIL NFR BLD AUTO: 0.4 % (ref 0–7)
ERYTHROCYTE [DISTWIDTH] IN BLOOD BY AUTOMATED COUNT: 14.9 % (ref 11.5–14.5)
GFR SERPL CREATININE-BSD FRML MDRD: 55 ML/MIN/1.73
GLOBULIN UR ELPH-MCNC: 3.1 GM/DL
GLUCOSE BLD-MCNC: 91 MG/DL (ref 70–110)
GLUCOSE UR STRIP-MCNC: NEGATIVE MG/DL
HCT VFR BLD AUTO: 43.8 % (ref 37–47)
HGB BLD-MCNC: 14.7 G/DL (ref 12–16)
HGB UR QL STRIP.AUTO: NEGATIVE
HYALINE CASTS UR QL AUTO: ABNORMAL /LPF
IMM GRANULOCYTES # BLD: 0.01 10*3/MM3 (ref 0–0.03)
IMM GRANULOCYTES NFR BLD: 0.1 % (ref 0–0.5)
KETONES UR QL STRIP: NEGATIVE
LEUKOCYTE ESTERASE UR QL STRIP.AUTO: ABNORMAL
LYMPHOCYTES # BLD AUTO: 2.42 10*3/MM3 (ref 1–3)
LYMPHOCYTES NFR BLD AUTO: 31.3 % (ref 16–46)
MCH RBC QN AUTO: 30.8 PG (ref 27–33)
MCHC RBC AUTO-ENTMCNC: 33.6 G/DL (ref 33–37)
MCV RBC AUTO: 91.8 FL (ref 80–94)
METHADONE UR QL SCN: NEGATIVE
MONOCYTES # BLD AUTO: 0.78 10*3/MM3 (ref 0.1–0.9)
MONOCYTES NFR BLD AUTO: 10.1 % (ref 0–12)
NEUTROPHILS # BLD AUTO: 4.46 10*3/MM3 (ref 1.4–6.5)
NEUTROPHILS NFR BLD AUTO: 57.7 % (ref 40–75)
NITRITE UR QL STRIP: NEGATIVE
OPIATES UR QL: NEGATIVE
OSMOLALITY SERPL CALC.SUM OF ELEC: 278.3 MOSM/KG (ref 273–305)
OXYCODONE UR QL SCN: NEGATIVE
PCP UR QL SCN: NEGATIVE
PH UR STRIP.AUTO: 6.5 [PH] (ref 5–8)
PLATELET # BLD AUTO: 377 10*3/MM3 (ref 130–400)
PMV BLD AUTO: 10.9 FL (ref 6–10)
POTASSIUM BLD-SCNC: 3.9 MMOL/L (ref 3.5–5.3)
PROT SERPL-MCNC: 7.6 G/DL (ref 6–8)
PROT UR QL STRIP: NEGATIVE
RBC # BLD AUTO: 4.77 10*6/MM3 (ref 4.2–5.4)
RBC # UR: ABNORMAL /HPF
REF LAB TEST METHOD: ABNORMAL
SODIUM BLD-SCNC: 139 MMOL/L (ref 135–153)
SP GR UR STRIP: 1.01 (ref 1–1.03)
SQUAMOUS #/AREA URNS HPF: ABNORMAL /HPF
T4 FREE SERPL-MCNC: 1.18 NG/DL (ref 0.89–1.76)
TSH SERPL DL<=0.05 MIU/L-ACNC: 2.66 MIU/ML (ref 0.55–4.78)
UROBILINOGEN UR QL STRIP: ABNORMAL
WBC NRBC COR # BLD: 7.73 10*3/MM3 (ref 4.5–12.5)
WBC UR QL AUTO: ABNORMAL /HPF

## 2018-04-30 PROCEDURE — 85025 COMPLETE CBC W/AUTO DIFF WBC: CPT | Performed by: PSYCHIATRY & NEUROLOGY

## 2018-04-30 PROCEDURE — 93010 ELECTROCARDIOGRAM REPORT: CPT | Performed by: INTERNAL MEDICINE

## 2018-04-30 PROCEDURE — 80307 DRUG TEST PRSMV CHEM ANLYZR: CPT | Performed by: PSYCHIATRY & NEUROLOGY

## 2018-04-30 PROCEDURE — 81001 URINALYSIS AUTO W/SCOPE: CPT | Performed by: PSYCHIATRY & NEUROLOGY

## 2018-04-30 PROCEDURE — 71046 X-RAY EXAM CHEST 2 VIEWS: CPT

## 2018-04-30 PROCEDURE — 84443 ASSAY THYROID STIM HORMONE: CPT | Performed by: PSYCHIATRY & NEUROLOGY

## 2018-04-30 PROCEDURE — 72020 X-RAY EXAM OF SPINE 1 VIEW: CPT

## 2018-04-30 PROCEDURE — 99214 OFFICE O/P EST MOD 30 MIN: CPT | Performed by: PSYCHIATRY & NEUROLOGY

## 2018-04-30 PROCEDURE — 80053 COMPREHEN METABOLIC PANEL: CPT | Performed by: PSYCHIATRY & NEUROLOGY

## 2018-04-30 PROCEDURE — 71046 X-RAY EXAM CHEST 2 VIEWS: CPT | Performed by: RADIOLOGY

## 2018-04-30 PROCEDURE — 93005 ELECTROCARDIOGRAM TRACING: CPT | Performed by: PSYCHIATRY & NEUROLOGY

## 2018-04-30 PROCEDURE — 84439 ASSAY OF FREE THYROXINE: CPT | Performed by: PSYCHIATRY & NEUROLOGY

## 2018-04-30 PROCEDURE — 72020 X-RAY EXAM OF SPINE 1 VIEW: CPT | Performed by: RADIOLOGY

## 2018-04-30 RX ORDER — IPRATROPIUM BROMIDE AND ALBUTEROL SULFATE 2.5; .5 MG/3ML; MG/3ML
3 SOLUTION RESPIRATORY (INHALATION) EVERY 6 HOURS PRN
COMMUNITY
End: 2018-09-17

## 2018-04-30 RX ORDER — AMLODIPINE BESYLATE 5 MG/1
5 TABLET ORAL DAILY
Status: CANCELLED | OUTPATIENT
Start: 2018-05-01

## 2018-04-30 RX ORDER — IBUPROFEN 600 MG/1
600 TABLET ORAL EVERY 6 HOURS PRN
Status: DISCONTINUED | OUTPATIENT
Start: 2018-04-30 | End: 2018-04-30

## 2018-04-30 RX ORDER — IPRATROPIUM BROMIDE AND ALBUTEROL SULFATE 2.5; .5 MG/3ML; MG/3ML
3 SOLUTION RESPIRATORY (INHALATION) EVERY 6 HOURS PRN
Status: CANCELLED | OUTPATIENT
Start: 2018-04-30

## 2018-04-30 RX ORDER — DOCUSATE SODIUM 250 MG
250 CAPSULE ORAL DAILY PRN
Status: CANCELLED | OUTPATIENT
Start: 2018-04-30

## 2018-04-30 RX ORDER — CARBOXYMETHYLCELLULOSE SODIUM 5 MG/ML
2 SOLUTION/ DROPS OPHTHALMIC 2 TIMES DAILY
Status: CANCELLED | OUTPATIENT
Start: 2018-04-30

## 2018-04-30 RX ORDER — LORAZEPAM 1 MG/1
1 TABLET ORAL NIGHTLY
Status: CANCELLED | OUTPATIENT
Start: 2018-04-30

## 2018-04-30 RX ORDER — POLYETHYLENE GLYCOL 3350 17 G/17G
17 POWDER, FOR SOLUTION ORAL DAILY
Status: CANCELLED | OUTPATIENT
Start: 2018-05-01

## 2018-04-30 RX ORDER — DOCUSATE SODIUM 250 MG
250 CAPSULE ORAL DAILY PRN
COMMUNITY
End: 2018-05-10 | Stop reason: HOSPADM

## 2018-04-30 RX ORDER — CETIRIZINE HYDROCHLORIDE 10 MG/1
5 TABLET ORAL DAILY
Status: CANCELLED | OUTPATIENT
Start: 2018-05-01

## 2018-04-30 RX ORDER — IPRATROPIUM BROMIDE AND ALBUTEROL SULFATE 2.5; .5 MG/3ML; MG/3ML
3 SOLUTION RESPIRATORY (INHALATION) EVERY 6 HOURS PRN
Status: DISCONTINUED | OUTPATIENT
Start: 2018-04-30 | End: 2018-05-05

## 2018-04-30 RX ORDER — BUPROPION HYDROCHLORIDE 150 MG/1
150 TABLET, EXTENDED RELEASE ORAL EVERY 12 HOURS SCHEDULED
Status: CANCELLED | OUTPATIENT
Start: 2018-04-30

## 2018-04-30 RX ORDER — LORAZEPAM 0.5 MG/1
0.5 TABLET ORAL 2 TIMES DAILY
Status: CANCELLED | OUTPATIENT
Start: 2018-05-01

## 2018-04-30 RX ORDER — LORAZEPAM 0.5 MG/1
0.5 TABLET ORAL 2 TIMES DAILY
Status: CANCELLED | OUTPATIENT
Start: 2018-04-30

## 2018-04-30 RX ORDER — AMLODIPINE BESYLATE 5 MG/1
5 TABLET ORAL DAILY
Status: DISCONTINUED | OUTPATIENT
Start: 2018-05-01 | End: 2018-05-01

## 2018-04-30 RX ORDER — CARBOXYMETHYLCELLULOSE SODIUM 5 MG/ML
2 SOLUTION/ DROPS OPHTHALMIC DAILY
Status: DISCONTINUED | OUTPATIENT
Start: 2018-04-30 | End: 2018-05-10 | Stop reason: HOSPADM

## 2018-04-30 RX ORDER — LORATADINE 10 MG/1
10 TABLET ORAL DAILY
COMMUNITY
End: 2020-05-21

## 2018-04-30 RX ORDER — ASPIRIN 81 MG/1
162 TABLET, CHEWABLE ORAL DAILY
Status: DISCONTINUED | OUTPATIENT
Start: 2018-04-30 | End: 2018-05-01

## 2018-04-30 RX ORDER — ACETAMINOPHEN 325 MG/1
487.5 TABLET ORAL EVERY 6 HOURS PRN
Status: DISCONTINUED | OUTPATIENT
Start: 2018-04-30 | End: 2018-05-10 | Stop reason: HOSPADM

## 2018-04-30 RX ORDER — POLYETHYLENE GLYCOL 3350 17 G/17G
17 POWDER, FOR SOLUTION ORAL DAILY
COMMUNITY
End: 2018-09-17

## 2018-04-30 RX ORDER — LORAZEPAM 0.5 MG/1
0.5 TABLET ORAL 2 TIMES DAILY
COMMUNITY
End: 2018-11-20

## 2018-04-30 RX ORDER — BUPROPION HYDROCHLORIDE 150 MG/1
150 TABLET, EXTENDED RELEASE ORAL EVERY 12 HOURS SCHEDULED
COMMUNITY
Start: 2018-04-11 | End: 2018-05-10 | Stop reason: HOSPADM

## 2018-04-30 RX ADMIN — ASPIRIN 162 MG: 81 TABLET, CHEWABLE ORAL at 17:01

## 2018-04-30 RX ADMIN — APIXABAN 5 MG: 5 TABLET, FILM COATED ORAL at 21:30

## 2018-04-30 RX ADMIN — CARBOXYMETHYLCELLULOSE SODIUM 2 DROP: 5 SOLUTION/ DROPS OPHTHALMIC at 21:52

## 2018-04-30 RX ADMIN — POLYETHYLENE GLYCOL (3350) 17 G: 17 POWDER, FOR SOLUTION ORAL at 17:01

## 2018-04-30 NOTE — PROGRESS NOTES
"Patient ID: Lacie Win is a 72 y.o. female     Patient canceled appointment 3/27/18.     SERVICE TYPE: EVALUATION AND MANAGEMENT (greater than 50% of the time spent for supportive psychotherapy).  Time in: 1312     Time out: 1405    /92   Pulse 92   Ht 147.3 cm (58\")   Wt 59 kg (130 lb)   BMI 27.17 kg/m²    Weight down 3 lbs since March.     ALLERGIES:  Ciprofloxacin; Lamotrigine; Penicillins; Risperidone and related; Seroquel [quetiapine fumarate]; Sulfa antibiotics; and Zoloft [sertraline hcl]    CC: \"Havn't been doing anything, think I can't\"     FOLLOWED FOR: Depression.     HPI: \"Stupid stuff like I think I can't walk, feels like I'm in a panic, like I can't eat, so uncertain about every thing\". Sleep:\"some through the night\". \"Feels hopeless now, can't go home like this\". \"Some time think I'd be better off gone so my kids wouldn't have to go through this\". Denies SI/SP/HI. No auditory hallucinations or paranoia.   Recall for yesterday events intact.     So - what about ECT. Seen with her daughter and her \"best friend\" today, discussed treatment options. Daughter's for it, patient hesitant, very indecisive and perplexed. A Dr. Myrick who see's the patient at the nursing home also encouraging patient to try ECT. Offered inpatient to start with, outpatient if necessary.   Reviewed the ECT informed consent with the patient and daughter/her friend.   Unable to initially convince the patient but then she signed the ECT informed consent. Very doubtful she would past through the outpatient evaluation and consent, will try inpatient.     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      PFSH: nursing home report reviewed.     Review of Systems   Respiratory: Positive for shortness of breath.    Cardiovascular: Negative.    Gastrointestinal: Negative.         Stomach feel empty.   Musculoskeletal: Negative.    Neurological: Negative.        SUPPORTIVE " PSYCHOTHERAPY: continuing efforts to promote the therapeutic alliance, address the patient’s issues, and strengthen self awareness, insights, and coping skills.       Mental Status Exam  Appearance:  clean and casually dressed, appropriate  Attitude toward clinician:  cooperative and agreeable   Speech:    Rate:  regular rate and rhythm   Volume: normal  Motor:  no abnormal movements present  Mood:  Good  Affect:  euthymic  Thought Processes:  linear, logical, and goal directed  Thought Content:  Normal   Feeling Hopeless: absent  Suicidal Thoughts:  absent  Homicidal Thoughts:  absent  Perceptual Disturbance: no perceptual disturbance  Attention and Concentration:  good  Insight and Judgement:  good  Memory:  memory appears to be intact    LABS: No results found for this or any previous visit (from the past 168 hour(s)).    MEDICATION ISSUES: Have discussed with the patient the medications Risks, Benefits, and Side effects including potential falls, possible impaired driving and  metabolic adversities among others. No medication side effects or related complaints today.     TREATMENT PLAN/GOALS: Continue supportive psychotherapy efforts and medications as indicated.     No current outpatient prescriptions on file.     No current facility-administered medications for this visit.        COLLATERAL PSYCHOTHERAPEUTIC INTERVENTION: patient not interested in additional psychotherapy.     Encounter Diagnoses   Name Primary?   • Severe episode of recurrent major depressive disorder, without psychotic features Yes   • GERMAN (generalized anxiety disorder)        Admitted to the hospital.        Dictated utilizing Dragon dictation

## 2018-05-01 ENCOUNTER — APPOINTMENT (OUTPATIENT)
Dept: ULTRASOUND IMAGING | Facility: HOSPITAL | Age: 73
End: 2018-05-01

## 2018-05-01 PROBLEM — D75.81 MYELOFIBROSIS: Status: ACTIVE | Noted: 2018-05-01

## 2018-05-01 PROBLEM — Z94.81 BONE MARROW TRANSPLANT STATUS (HCC): Status: ACTIVE | Noted: 2018-05-01

## 2018-05-01 LAB
ANION GAP SERPL CALCULATED.3IONS-SCNC: 10.2 MMOL/L (ref 3.6–11.2)
BASOPHILS # BLD AUTO: 0.02 10*3/MM3 (ref 0–0.3)
BASOPHILS NFR BLD AUTO: 0.2 % (ref 0–2)
BUN BLD-MCNC: 17 MG/DL (ref 7–21)
BUN/CREAT SERPL: 17.5 (ref 7–25)
CALCIUM SPEC-SCNC: 10.3 MG/DL (ref 7.7–10)
CHLORIDE SERPL-SCNC: 100 MMOL/L (ref 99–112)
CO2 SERPL-SCNC: 25.8 MMOL/L (ref 24.3–31.9)
CREAT BLD-MCNC: 0.97 MG/DL (ref 0.43–1.29)
DEPRECATED RDW RBC AUTO: 45.8 FL (ref 37–54)
EOSINOPHIL # BLD AUTO: 0 10*3/MM3 (ref 0–0.7)
EOSINOPHIL NFR BLD AUTO: 0 % (ref 0–7)
ERYTHROCYTE [DISTWIDTH] IN BLOOD BY AUTOMATED COUNT: 13.9 % (ref 11.5–14.5)
GFR SERPL CREATININE-BSD FRML MDRD: 56 ML/MIN/1.73
GLUCOSE BLD-MCNC: 117 MG/DL (ref 70–110)
HCT VFR BLD AUTO: 43.7 % (ref 37–47)
HGB BLD-MCNC: 14.8 G/DL (ref 12–16)
IMM GRANULOCYTES # BLD: 0.01 10*3/MM3 (ref 0–0.03)
IMM GRANULOCYTES NFR BLD: 0.1 % (ref 0–0.5)
INR PPP: 1.09 (ref 0.9–1.1)
LYMPHOCYTES # BLD AUTO: 1.39 10*3/MM3 (ref 1–3)
LYMPHOCYTES NFR BLD AUTO: 16.4 % (ref 16–46)
MCH RBC QN AUTO: 30.6 PG (ref 27–33)
MCHC RBC AUTO-ENTMCNC: 33.9 G/DL (ref 33–37)
MCV RBC AUTO: 90.5 FL (ref 80–94)
MONOCYTES # BLD AUTO: 0.49 10*3/MM3 (ref 0.1–0.9)
MONOCYTES NFR BLD AUTO: 5.8 % (ref 0–12)
NEUTROPHILS # BLD AUTO: 6.57 10*3/MM3 (ref 1.4–6.5)
NEUTROPHILS NFR BLD AUTO: 77.5 % (ref 40–75)
OSMOLALITY SERPL CALC.SUM OF ELEC: 274.5 MOSM/KG (ref 273–305)
PLATELET # BLD AUTO: 378 10*3/MM3 (ref 130–400)
PMV BLD AUTO: 10.8 FL (ref 6–10)
POTASSIUM BLD-SCNC: 4.1 MMOL/L (ref 3.5–5.3)
PROTHROMBIN TIME: 14.2 SECONDS (ref 11–15.4)
RBC # BLD AUTO: 4.83 10*6/MM3 (ref 4.2–5.4)
SODIUM BLD-SCNC: 136 MMOL/L (ref 135–153)
WBC NRBC COR # BLD: 8.48 10*3/MM3 (ref 4.5–12.5)

## 2018-05-01 PROCEDURE — 94799 UNLISTED PULMONARY SVC/PX: CPT

## 2018-05-01 PROCEDURE — 99223 1ST HOSP IP/OBS HIGH 75: CPT | Performed by: PSYCHIATRY & NEUROLOGY

## 2018-05-01 PROCEDURE — 85025 COMPLETE CBC W/AUTO DIFF WBC: CPT | Performed by: PHYSICIAN ASSISTANT

## 2018-05-01 PROCEDURE — 80048 BASIC METABOLIC PNL TOTAL CA: CPT | Performed by: PHYSICIAN ASSISTANT

## 2018-05-01 PROCEDURE — 93970 EXTREMITY STUDY: CPT

## 2018-05-01 PROCEDURE — 85610 PROTHROMBIN TIME: CPT | Performed by: PHYSICIAN ASSISTANT

## 2018-05-01 PROCEDURE — 99222 1ST HOSP IP/OBS MODERATE 55: CPT | Performed by: HOSPITALIST

## 2018-05-01 PROCEDURE — 93970 EXTREMITY STUDY: CPT | Performed by: RADIOLOGY

## 2018-05-01 RX ORDER — AMLODIPINE BESYLATE 5 MG/1
5 TABLET ORAL ONCE
Status: COMPLETED | OUTPATIENT
Start: 2018-05-01 | End: 2018-05-01

## 2018-05-01 RX ORDER — LORAZEPAM 0.5 MG/1
0.5 TABLET ORAL ONCE
Status: COMPLETED | OUTPATIENT
Start: 2018-05-01 | End: 2018-05-01

## 2018-05-01 RX ORDER — BENZONATATE 100 MG/1
100 CAPSULE ORAL 3 TIMES DAILY PRN
Status: DISCONTINUED | OUTPATIENT
Start: 2018-05-01 | End: 2018-05-10 | Stop reason: HOSPADM

## 2018-05-01 RX ORDER — LORAZEPAM 0.5 MG/1
0.5 TABLET ORAL DAILY
Status: DISCONTINUED | OUTPATIENT
Start: 2018-05-02 | End: 2018-05-10 | Stop reason: HOSPADM

## 2018-05-01 RX ORDER — TRAZODONE HYDROCHLORIDE 50 MG/1
50 TABLET ORAL NIGHTLY PRN
Status: DISCONTINUED | OUTPATIENT
Start: 2018-05-01 | End: 2018-05-10 | Stop reason: HOSPADM

## 2018-05-01 RX ORDER — HYDROXYZINE 50 MG/1
50 TABLET, FILM COATED ORAL EVERY 6 HOURS PRN
Status: DISCONTINUED | OUTPATIENT
Start: 2018-05-01 | End: 2018-05-10 | Stop reason: HOSPADM

## 2018-05-01 RX ORDER — AMLODIPINE BESYLATE 10 MG/1
10 TABLET ORAL DAILY
Status: DISCONTINUED | OUTPATIENT
Start: 2018-05-02 | End: 2018-05-04

## 2018-05-01 RX ORDER — BENZTROPINE MESYLATE 1 MG/1
1 TABLET ORAL DAILY PRN
Status: DISCONTINUED | OUTPATIENT
Start: 2018-05-01 | End: 2018-05-10 | Stop reason: HOSPADM

## 2018-05-01 RX ORDER — BENZTROPINE MESYLATE 1 MG/ML
0.5 INJECTION INTRAMUSCULAR; INTRAVENOUS DAILY PRN
Status: DISCONTINUED | OUTPATIENT
Start: 2018-05-01 | End: 2018-05-10 | Stop reason: HOSPADM

## 2018-05-01 RX ORDER — ALUMINA, MAGNESIA, AND SIMETHICONE 2400; 2400; 240 MG/30ML; MG/30ML; MG/30ML
15 SUSPENSION ORAL EVERY 6 HOURS PRN
Status: DISCONTINUED | OUTPATIENT
Start: 2018-05-01 | End: 2018-05-10 | Stop reason: HOSPADM

## 2018-05-01 RX ORDER — LORAZEPAM 1 MG/1
1 TABLET ORAL NIGHTLY
Status: DISCONTINUED | OUTPATIENT
Start: 2018-05-01 | End: 2018-05-10 | Stop reason: HOSPADM

## 2018-05-01 RX ADMIN — APIXABAN 5 MG: 5 TABLET, FILM COATED ORAL at 08:38

## 2018-05-01 RX ADMIN — ASPIRIN 162 MG: 81 TABLET, CHEWABLE ORAL at 08:37

## 2018-05-01 RX ADMIN — AMLODIPINE BESYLATE 5 MG: 5 TABLET ORAL at 15:56

## 2018-05-01 RX ADMIN — AMLODIPINE BESYLATE 5 MG: 5 TABLET ORAL at 08:38

## 2018-05-01 RX ADMIN — LORAZEPAM 0.5 MG: 0.5 TABLET ORAL at 15:58

## 2018-05-01 RX ADMIN — CARBOXYMETHYLCELLULOSE SODIUM 2 DROP: 5 SOLUTION/ DROPS OPHTHALMIC at 08:39

## 2018-05-01 RX ADMIN — POLYETHYLENE GLYCOL (3350) 17 G: 17 POWDER, FOR SOLUTION ORAL at 08:38

## 2018-05-01 RX ADMIN — LORAZEPAM 1 MG: 1 TABLET ORAL at 21:14

## 2018-05-01 NOTE — PLAN OF CARE
Problem: Patient Care Overview  Goal: Plan of Care Review  Outcome: Ongoing (interventions implemented as appropriate)   05/01/18 7252   Coping/Psychosocial   Plan of Care Reviewed With patient   Coping/Psychosocial   Patient Agreement with Plan of Care agrees   Plan of Care Review   Progress no change   OTHER   Outcome Summary Denies si/hi. Up and out of her room today. Somatic. States she feels scared.       Problem: Overarching Goals (Adult)  Goal: Adheres to Safety Considerations for Self and Others  Outcome: Ongoing (interventions implemented as appropriate)    Goal: Optimized Coping Skills in Response to Life Stressors  Outcome: Ongoing (interventions implemented as appropriate)    Goal: Develops/Participates in Therapeutic [place] to Support Successful Transition  Outcome: Ongoing (interventions implemented as appropriate)      Problem: Fall Risk (Adult)  Goal: Identify Related Risk Factors and Signs and Symptoms  Outcome: Ongoing (interventions implemented as appropriate)    Goal: Absence of Fall  Outcome: Ongoing (interventions implemented as appropriate)

## 2018-05-01 NOTE — PLAN OF CARE
"Problem: Patient Care Overview  Goal: Plan of Care Review  Outcome: Ongoing (interventions implemented as appropriate)   05/01/18 0423   Coping/Psychosocial   Plan of Care Reviewed With patient   Coping/Psychosocial   Patient Agreement with Plan of Care agrees   Plan of Care Review   Progress no change   OTHER   Outcome Summary PT RATED ANXIETY 10, UNABLE TO RATE DEPRESSION ON SCALE \"I JUST FEEL LIKE MY OXYGEN ISN'T GOING ALL THE WAY DOWN, DENIED SI/HI/HALLUCINATIONS BUT DID REPORT FEELING SCARED & PARANOID, PT HASN'T SLEEP THIS SHIFT. REPORTED THAT SHE HAS HX OF PANIC ATTACKS & HER DOCTOR HAD ORDERED HER ATIVAN FOR THIS TWICE A DAY.       Problem: Overarching Goals (Adult)  Goal: Adheres to Safety Considerations for Self and Others  Outcome: Ongoing (interventions implemented as appropriate)    Goal: Optimized Coping Skills in Response to Life Stressors  Outcome: Ongoing (interventions implemented as appropriate)    Goal: Develops/Participates in Therapeutic [place] to Support Successful Transition  Outcome: Ongoing (interventions implemented as appropriate)      Problem: Fall Risk (Adult)  Goal: Identify Related Risk Factors and Signs and Symptoms  Outcome: Ongoing (interventions implemented as appropriate)    Goal: Absence of Fall  Outcome: Ongoing (interventions implemented as appropriate)        "

## 2018-05-01 NOTE — NURSING NOTE
"Pt had a nose bleed. Scant amount red blood. Noted.reports has these \"every now and then\". Notified WANDA Rosenthal rn  "

## 2018-05-01 NOTE — PLAN OF CARE
"Problem: Patient Care Overview  Goal: Interprofessional Rounds/Family Conf  Outcome: Ongoing (interventions implemented as appropriate)   05/01/18 1446   Interdisciplinary Rounds/Family Conf   Summary Family Collateral    Interdisciplinary Rounds/Family Conf   Participants social work;patient;family     1419  Therapist contacted Patient's daughter Elysia Fallon at [phone] who is also POA. Elysia reports that Patient's disorganized thinking began 2 months prior to Patient's spouse of 56 years passing away due to bone cancer. Elysia states that after this death the Patient began getting progressive worse. Elysia states that the Patient will voice the belief that she can not \"eat, walk, or pee\". Elysia reports that the Patient has lived in the [facility] for the past 14 months. Elysia states that she is hopeful that the ECT treatment will help address Patient's behaviors with goal to return home upon discharge. Elysia states that the Patient can reside outside of [facility] for 10 days prior to losing her placement there. Elysia states that she hopes Patient's behaviors are due to depression and that treatment can help because she would like the Patient to return home. Elysia states that if the Patient can eat and dress herself that she would be returning home and would be living with her son and grandson. Elysia reports Patient has been on Ativan and Wellbutrian for \"years\" due to anxiety and \"panic\".       "

## 2018-05-01 NOTE — CONSULTS
Inpatient Hospitalist Consult  Consult performed by: ADRIANA CLEMENTS  Consult ordered by: WILLY NGUYEN          Patient Identification:  Name:  Lacie Win  Age:  72 y.o.  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  She is a resident of [facility] admitted by Dr. Nguyen for possible ECT treatment. A consultation was placed with the hospitalist group for hypertension and ECT clearance.   Upon evaluate of patient, she is holding a kleenex containing multiple nickel sized clots of blood and heavily blood tinged sputum.  She reports that she has been coughing up clots of blood since last night. She says this occurs occasionally, especially when she develops coughing spells. The nursing staff report she had a couple mild nose bleeds yesterday. Patient denies history of frequent nosebleeds. My PA Anastasia further discussed with with the nursing home staff at [facility]. They state she does have some hemoptysis intermittently.  Her daughter/POA reports that she was evaluated in the [place] ED last month for this with Eliquis stopped for 3 days then restarted. She is chronically anticoagulated with Eliquis in addition to having [place] filter placement for history of DVTs. She complains of bilateral leg pain left worse than right. She is unsure of when her eber filter was placed, but her daughter tells me it was around  when she had bilateral DVTs and PEs during a period when she received both bone marrow transplant and partial splenectomy.  Mrs. Win does report some mild dyspnea intermittently, but nursing staff reports her oxygen  saturation has been in the upper 90s on room air since arrival and that she has not exhibited any significant dyspnea.      My PA Anastasia also discussed the patient's history and current symptoms with her daughter/GUDELIA Hyde.       ---------------------------------------------------------------------------------------------------------------------  Review of Systems   Constitutional: Negative for activity change and chills.   HENT: Negative for congestion and drooling.    Eyes: Negative for pain and discharge.   Respiratory: Positive for cough and shortness of breath. Negative for chest tightness.    Cardiovascular: Negative for chest pain and leg swelling.   Gastrointestinal: Positive for abdominal pain (intermittently; patient states this is brought on by eating). Negative for abdominal distention, diarrhea and nausea.   Endocrine: Negative for cold intolerance and heat intolerance.   Genitourinary: Negative for difficulty urinating and dysuria.   Musculoskeletal: Negative for arthralgias and gait problem.   Skin: Negative for color change and pallor.   Neurological: Negative for dizziness and headaches.   Psychiatric/Behavioral: Negative for agitation and confusion.      ---------------------------------------------------------------------------------------------------------------------   Past History:  Family History   Problem Relation Age of Onset   • Cancer Father    • Cancer Mother    • Depression Mother    • Anxiety disorder Mother      Past Medical History:   Diagnosis Date   • Anxiety    • Bone marrow transplant status    • Cancer    • Dementia    • Depression    • Dry eyes    • DVT (deep venous thrombosis)    • Graft vs host disease      &    • HTN (hypertension)    • Myelofibrosis     Bone marrow   • Panic attacks    • Psychiatric illness      Past Surgical History:   Procedure Laterality Date   • BONE MARROW BIOPSY     •  SECTION     • COLON RESECTION      due to tumor which turned out to  be benign. pt unable to remember date.    • SAMAN FILTER INSERTION JUGULAR     • SKIN CANCER EXCISION     • SMALL INTESTINE SURGERY     • SPLENECTOMY     • SPLENECTOMY, PARTIAL      as part of the bone marrow transplant.    • TRANSURETHRAL RESECTION OF BLADDER TUMOR       Social History     Social History   • Marital status:      Social History Main Topics   • Smoking status: Former Smoker   • Smokeless tobacco: Never Used      Comment: quit approximately 30 years ago   • Alcohol use No   • Drug use: No   • Sexual activity: No     Other Topics Concern   • Not on file     ---------------------------------------------------------------------------------------------------------------------   Allergies:  Ciprofloxacin; Lamotrigine; Risperidone and related; Seroquel [quetiapine fumarate]; Sulfa antibiotics; Zoloft [sertraline hcl]; and Penicillins  ---------------------------------------------------------------------------------------------------------------------   Prior to Admission Medications     Prescriptions Last Dose Informant Patient Reported? Taking?    amLODIPine (NORVASC) 5 MG tablet 4/30/2018 Nursing Home Yes Yes    Take 5 mg by mouth Daily.    apixaban (ELIQUIS) 5 MG tablet tablet 4/30/2018 Nursing Home Yes Yes    Take 5 mg by mouth Every 12 (Twelve) Hours.    buPROPion SR (WELLBUTRIN SR) 150 MG 12 hr tablet 4/30/2018 Nursing Home Yes Yes    Take 150 mg by mouth Every 12 (Twelve) Hours.    ipratropium-albuterol (DUO-NEB) 0.5-2.5 mg/mL nebulizer 4/8/2018 Nursing Home Yes Yes    Take 3 mL by nebulization Every 6 (Six) Hours As Needed for Wheezing.    loratadine (CLARITIN) 10 MG tablet 4/30/2018 Nursing Home Yes Yes    Take 10 mg by mouth Daily.    LORazepam (ATIVAN) 0.5 MG tablet 4/30/2018 Nursing Home Yes Yes    Take 0.5 mg by mouth 2 (Two) Times a Day. Prior to [facility], Patient was on: takes at 0800 and 1400    LORazepam (ATIVAN) 1 MG tablet 4/29/2018 Nursing Home No Yes    Take 1  tablet by mouth Every Night.    Polyethyl Glycol-Propyl Glycol (SYSTANE) 0.4-0.3 % gel 4/30/2018 Nursing Home Yes Yes    Administer 2 drops to both eyes 2 (Two) Times a Day.    polyethylene glycol (MIRALAX) packet 4/30/2018 Nursing Home Yes Yes    Take 17 g by mouth Daily.    acetaminophen (TYLENOL) 500 MG tablet Unknown Nursing Home Yes No    Take 500 mg by mouth Every 6 (Six) Hours As Needed for Mild Pain (1-3).    docusate sodium (COLACE) 250 MG capsule Unknown Nursing Home Yes No    Take 250 mg by mouth Daily As Needed for Constipation.    magnesium hydroxide (MILK OF MAGNESIA) 2400 MG/10ML suspension suspension Unknown Nursing Home Yes No    Take 30 mL by mouth Daily As Needed.        Hospital Meds:    [START ON 5/2/2018] amLODIPine 10 mg Oral Daily   carboxymethylcellulose 2 drop Both Eyes Daily   [START ON 5/2/2018] LORazepam 0.5 mg Oral Daily   LORazepam 1 mg Oral Nightly   polyethylene glycol 17 g Oral Daily        ---------------------------------------------------------------------------------------------------------------------   Vital Signs:  Temp:  [97.2 °F (36.2 °C)-98.2 °F (36.8 °C)] 97.8 °F (36.6 °C)  Heart Rate:  [] 92  Resp:  [16-18] 18  BP: (144-180)/() 144/94  1    04/30/18  1510   Weight: 59 kg (130 lb)     Body mass index is 27.17 kg/m².  ---------------------------------------------------------------------------------------------------------------------   Physical exam:  Constitutional:  Alert. No acute distress.    HENT:  Head: Normocephalic and atraumatic.  Mouth:  Moist mucous membranes.    Eyes:  Conjunctivae and EOM are normal.  Pupils are equal, round, and reactive to light.  No scleral icterus.    Neck:  Neck supple.  No JVD present.    Cardiovascular:  Normal rate, regular rhythm. Normal s1/s2.  No murmur.  Pulmonary/Chest:  No respiratory distress, no wheezes, no crackles, with normal breath sounds and good air movement.  Abdominal:  Soft.  Bowel sounds are present.  No  distension and no tenderness.   Musculoskeletal: Left> right lower extremity edema with calf tenderness.   No red or swollen joints anywhere.    Neurological:  Alert and oriented to person, place, and time.  No cranial nerve deficit.  No tongue deviation.  No facial droop.  No slurred speech.   Skin:  Skin is warm and dry. No rash noted.  No pallor.   Psychiatric:  Alert to self.  Follows commands.  Anxious appearing.   ---------------------------------------------------------------------------------------------------------------------   EKG: NSR with sinus arrhythmia, HR 83, QTc 448. No overt ST changes to suggest acute ischemia appreciated.      ---------------------------------------------------------------------------------------------------------------------     Results from last 7 days  Lab Units 05/01/18  1805 04/30/18  1530   WBC 10*3/mm3 8.48 7.73   HEMOGLOBIN g/dL 14.8 14.7   HEMATOCRIT % 43.7 43.8   MCV fL 90.5 91.8   MCHC g/dL 33.9 33.6   PLATELETS 10*3/mm3 378 377   INR  1.09  --            Results from last 7 days  Lab Units 05/01/18  1805 04/30/18  1530   SODIUM mmol/L 136 139   POTASSIUM mmol/L 4.1 3.9   CHLORIDE mmol/L 100 104   CO2 mmol/L 25.8 27.7   BUN mg/dL 17 16   CREATININE mg/dL 0.97 0.99   EGFR IF NONAFRICN AM mL/min/1.73 56* 55*   CALCIUM mg/dL 10.3* 9.6   GLUCOSE mg/dL 117* 91   ALBUMIN g/dL  --  4.50   BILIRUBIN mg/dL  --  0.4   ALK PHOS U/L  --  90   AST (SGOT) U/L  --  21   ALT (SGPT) U/L  --  14   Estimated Creatinine Clearance: 41.5 mL/min (by C-G formula based on SCr of 0.97 mg/dL).  No results found for: AMMONIA          Lab Results   Component Value Date    HGBA1C 5.5 01/07/2015     Lab Results   Component Value Date    TSH 2.656 04/30/2018    FREET4 1.18 04/30/2018     No results found for: PREGTESTUR, PREGSERUM, HCG, HCGQUANT  Pain Management Panel     Pain Management Panel Latest Ref Rng & Units 4/30/2018 5/13/2017    AMPHETAMINES SCREEN, URINE Negative Negative Negative     BARBITURATES SCREEN Negative Negative Positive(A)    BENZODIAZEPINE SCREEN, URINE Negative Negative Negative    BUPRENORPHINE Negative Negative Negative    COCAINE SCREEN, URINE Negative Negative Negative    METHADONE SCREEN, URINE Negative Negative Negative    METHAMPHETAMINE UR NEGATIVE ng/mL - -                        ---------------------------------------------------------------------------------------------------------------------   Imaging Results (last 7 days)     Procedure Component Value Units Date/Time    US Venous Doppler Lower Extremity Bilateral (duplex) [[id number]] Updated:  05/01/18 1841    XR Spine Lumbar Lateral [[id number]] Collected:  05/01/18 0827     Updated:  05/01/18 0829    Narrative:       EXAMINATION: XR SPINE LUMBAR LATERAL-      Technique:5 views of lumbar spine.     CLINICAL INDICATION:    Back pain.     COMPARISON:    None.     FINDINGS:    Vertebral body height and alignment are maintained. There  is mild multilevel degenerative change. No compression deformity.       Impression:       No radiographic evidence of acute compression fracture of  the lumbar spine.         This report was finalized on 5/1/2018 8:27 AM by Dr. Arik Durbin MD.       XR Chest PA & Lateral [[id number]] Collected:  04/30/18 2132     Updated:  04/30/18 2134    Narrative:       EXAMINATION: XR CHEST PA AND LATERAL-      CLINICAL INDICATION: ELECTROCONVULSIVE THERAPY          COMPARISON: 05/19/2017      TECHNIQUE: XR CHEST PA AND LATERAL-      FINDINGS:   Lungs are adequately aerated.   Heart and mediastinal contours are unremarkable.   No pneumothorax.   Bony and soft tissue structures are unremarkable.            Impression:       No radiographic evidence of acute cardiac or pulmonary disease.     This report was finalized on 4/30/2018 9:32 PM by Dr. Eddy De León MD.                   I have personally reviewed the radiology images and read the final radiology  "report.  ---------------------------------------------------------------------------------------------------------------------     Assessment and Plan:    -Hemoptysis in setting of chronic anticoagulation:  Eliquis and aspirin have been stopped.  Possibly secondary to recent nosebleed; however, sputum was very heavily tinged at the time of evaluation and patient reports intermittent hemoptysis for \"some time\" without corresponding nose bleeds in the past. CXR unremarkable.  Hemoglobin on admission of 14.7.  Repeat CBC shows hemoglobin unchanged. INR 1.09.   Discussed with daughter, and she states this has happened intermittently with Eliquis being stopped for short periods of time.  Discussed with NH staff member Barbi.  Reports Mrs. Win has been on Eliquis recently. Discussed with Dr Hess, who agress with holding eliquis and aspirin and recommends holding off on ECT at this time. He will see the patient tomorrow in consultation.      - Left >Right lower extremity edema:  US venous dopplers have been ordered.  Given hemoptysis, Eliquis stopped as previously outlined.  History of DVTs with Eber filter:   Prior June 2017 CT of the abdomen and pelvis revealed DVTs extending to her [place] filter at that time.      -Hypertension:  Norvasc increased to 10mg from 5mg with holding parameters placed.      -Dementia: MRI report from outside facility from January 2018 reviewed and available within this document.     -Depression: again, spoke with Dr Hess who recommends holding off on ECT until he can see the patient in consultation regarding her hemoptysis.    -Intermittent dyspnea:  CXR Unremarkable with clear breath sounds.  Oxygen saturation noted to be in the high 90s on room air. ABG ordered but patient refused. Patient has eber filter with history of DVTs and PEs, she is on chronic anticoagulation with eliquis but again this is now being held. Follow up on venous doppler of lower extremities ordered " above. Has mild renal insufficiency and since not hypoxic or tachycardic, will hold off on CT angiogram of chest at this time.    -History of myelofibrosis s/p reported bone marrow transplantation with graft vs host disease in th past. All cell counts within normal limits at this time.    Thank you for the opportunity to participate in the care of your patient. Will follow up on morning labs, BP, results of venous US, and recommendations from pulmonology consultation.    J Carlos Hightower MD  05/01/18  10:23 PM  ---------------------------------------------------------------------------------------------------------------------   * I have seen the patient in consultation with Anastasia Garnica PA-C, and have amended her note to reflect my own findings, assessment and recommendations.

## 2018-05-01 NOTE — H&P
"INITIAL PSYCHIATRIC HISTORY & PHYSICAL    Patient Identification:  Name: Lacie Win  Age: 72 y.o.  Sex: female  : 1945  MRN: [id number]   Visit Number: [id number]  Primary Care Physician: SHERLEY Jha    SUBJECTIVE    CC: \"Havn't been doing anything, think I can't\"     HPI: Lacie Win is a 72 y.o. female who was admitted on 2018.  Second [facility] admission for this 71-year-old  (2 years after 56 years of marriage) mother of 3  female who was recently seen and evaluated during her hospitalization here at the [facility] last week.  The focus of concern is a question of a early progressive dementia versus affect disorder.       This patient has a history of.  Of depression in  associated with the death of her father.  At that point she was hospitalized briefly at [facility] in [facility].  Specific treatment unknown.  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  She was depressed and was placed on Zoloft and a benzodiazepine which she continued to take for the next 15 years changing from the initial Klonopin 2 Ativan.  She had had some difficulties with a business failure which brought on some considerable financial distress in the year  and 15, then her   2 years ago and the family feels like the patient was \"overwhelmed with stress\".  They saw a gradual deterioration of functioning with the patient losing interest in usual interests household task with a gradual decline.  In 2016 she was hospitalized briefly here and diagnosed with a \"vascular dementia which says CT scan seem to confirm although the family consents that there had been a change in a scan done 4 years prior to this.  She abruptly stopped taking the benzodiazepine and in January had an acute delirious state with auditory visual hallucinations and confusion, ostensibly withdrawal " "phenomena and was hospitalized for 17 days at a [place] psychiatric facility.  That syndrome cleared but patient has since had difficulties with not only depression but also a question of a cognitive decline with memory deficits that aren't particularly consistent.  During a recent hospitalization here she did very poorly on a clock face and at least temporarily was concluded that she did have a progressive dementia issue.      At interview May 2017 with the patient's daughter and a friend who is a nurse practitioner adding a different additional information, the diagnosis is not so clear.  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  She has a coarse tremor of her upper extremities, right greater than left and her gait is better than it was even when she was in the hospital.  She is taking Eliquis as for past history of clotting difficulties, apparently deep vein thrombosis left lower extremity and actually has a portacaval screen.     The patient does not want to be at nursing home, she does not want to be in her own home for it is a \"depressing place\".  She obviously is very obstinate refusing take medications, specifically the Aricept has been prescribed.  She does take Ativan 1 mg twice a day.  She states she is afraid of taking medications, states she can't swallow although lets apparently been evaluated and she does not have a dysphagia.       The family has been inquiring about possibly electroconvulsive therapy given the fact that the patient's mother had depressive illness and twice had series of ECT that gained remission of her illness.  Of course the difficulty is with a possible progressive dementia combined with patient's apparent depressive illness this would be prohibitive.  On a Mini-Mental Status Examination done May 2017 she scored a 21 having difficulties with focus and concentration if not a lack " of willingness to complete the task?.  Her clock face done during the hospitalization was poor and indicative of dementia. Repeat since was done quite well.     Since the initial contact with the patient a year ago she has been follow intermittently in the clinic on a trial primarily with Wellbutrin.  At her visit August 8 patient was tending to her own hygiene and dressing herself, was demonstrating low grade paranoia but her memory was noted to be intact for recent events.  Throughout her subsequent follow-up she is continuing to question her ability to do basic functions such as walking talking swallowing etc.  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  She was continued on low-dose lorazepam and had a trial of adjunct Abilify low-dose.  At her clinic visit December 17 patient remained anxious and depressed doubting herself but again with no parent are progression of a memory deficit.  She did have symptoms of depersonalization and derealization.  At times she would express thoughts that she would be better off dead but was denied any suicidal ideation or intent.  At her office visit February 20 her mental status basically hadn't changed.  Attempted to add in venlafaxine unsuccessfully.    On January 31, 2018 patient had an MRI study done at the [facility] which revealed small vessel disease and's some mild diffuse cerebral at atrophy.    At the clinic visit on April 30 patient again presented perplexed indecisive anxious saying that she couldn't provide for her basic functions this is walking swallowing bathing and dressing etc.  They've been no clinical improvement and again her recall for recent events was intact.  Family pushing for ECT patient hesitantly, very hesitantly, agreed and signed ECT permit after it was read to her by her daughter's friend.  Patient admitted this time for further evaluation and stabilization anticipating a trial of bifrontal ECT.        PAST  "MEDICAL HISTORY: patient has a history of hypertension as well as the bone marrow transplant for yet to be defined hematological illness in the year .  Patient received a splenectomy and radiation therapy at that time.  She is also had a colon resection, she is also had skin cancer excision, and a transfer urethral resection of bladder tumor.  She has no known drug allergies, no allergies to contrast dye.  Her current medications include Norvasc 5 mg daily L\" is 5 mg daily aspirin 81 mg daily Colace, that the lorazepam, Megace suspension 400 mg twice a day.  Prior and depressed trials have included Zoloft and probably others.       SUBSTANCE USE HX: Not a clinical issue at this time, has taken Lorazepam per Rx for >20 years or longer.     SOCIAL HX: patient [place] [facility] [place] [facility] [place] and raised in [place].  She was  55 years prior to being  2 years ago.  She has 3 grown children, 10 grandchildren, 8 great-grandchildren.  She is a GED.  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  She had been living with a son prior to sequences of events that has led her to be current resident at the [facility], patient is unable to be cared for at home because of her lack of interest and self hygiene, eating, as well as being obstinate and depressed.    Past Medical History:   Diagnosis Date   • Anxiety    • Bone marrow transplant status    • Cancer    • Dementia    • Depression    • Dry eyes    • DVT (deep venous thrombosis)    • Graft vs host disease      &    • HTN (hypertension)    • Myelofibrosis     Bone marrow   • Panic attacks    • Psychiatric illness        Past Surgical History:   Procedure Laterality Date   • BONE MARROW BIOPSY     •  SECTION     • COLON RESECTION      due to tumor which turned out to be benign. pt unable to remember date.    • SAMAN FILTER INSERTION JUGULAR     • " SKIN CANCER EXCISION     • SMALL INTESTINE SURGERY     • SPLENECTOMY     • SPLENECTOMY, PARTIAL      as part of the bone marrow transplant.    • TRANSURETHRAL RESECTION OF BLADDER TUMOR         Family History   Problem Relation Age of Onset   • Cancer Father    • Cancer Mother    • Depression Mother    • Anxiety disorder Mother    patient's mother was treated for depression, had 2 series of ECT which was effective in gaining remission.  A brother is also had difficulties with depression.  No suicides among first-degree relatives.      Prescriptions Prior to Admission   Medication Sig Dispense Refill Last Dose   • amLODIPine (NORVASC) 5 MG tablet Take 5 mg by mouth Daily.   4/30/2018 at 0800   • apixaban (ELIQUIS) 5 MG tablet tablet Take 5 mg by mouth Every 12 (Twelve) Hours.   4/30/2018 at 0800   • buPROPion SR (WELLBUTRIN SR) 150 MG 12 hr tablet Take 150 mg by mouth Every 12 (Twelve) Hours.   4/30/2018 at 0800   • ipratropium-albuterol (DUO-NEB) 0.5-2.5 mg/mL nebulizer Take 3 mL by nebulization Every 6 (Six) Hours As Needed for Wheezing.   4/8/2018   • loratadine (CLARITIN) 10 MG tablet Take 10 mg by mouth Daily.   4/30/2018 at 0800   • LORazepam (ATIVAN) 0.5 MG tablet Take 0.5 mg by mouth 2 (Two) Times a Day. Prior to [facility] Admission, Patient was on: takes at 0800 and 1400   4/30/2018 at 0800   • LORazepam (ATIVAN) 1 MG tablet Take 1 tablet by mouth Every Night. 30 tablet 0 4/29/2018 at 2000   • Polyethyl Glycol-Propyl Glycol (SYSTANE) 0.4-0.3 % gel Administer 2 drops to both eyes 2 (Two) Times a Day.   4/30/2018 at 0800   • polyethylene glycol (MIRALAX) packet Take 17 g by mouth Daily.   4/30/2018 at 0800   • acetaminophen (TYLENOL) 500 MG tablet Take 500 mg by mouth Every 6 (Six) Hours As Needed for Mild Pain (1-3).   Unknown at Unknown time   • docusate sodium (COLACE) 250 MG capsule Take 250 mg by mouth Daily As Needed for Constipation.   Unknown at Unknown time   • magnesium hydroxide (MILK OF MAGNESIA)  2400 MG/10ML suspension suspension Take 30 mL by mouth Daily As Needed.   Unknown at Unknown time       Reviewed available past medical and psychiatric records.    ALLERGIES:  Ciprofloxacin; Lamotrigine; Risperidone and related; Seroquel [quetiapine fumarate]; Sulfa antibiotics; Zoloft [sertraline hcl]; and Penicillins    Temp:  [97.2 °F (36.2 °C)-98.2 °F (36.8 °C)] 98.2 °F (36.8 °C)  Heart Rate:  [65-97] 97  Resp:  [16-18] 16  BP: (154-180)/() 180/104    REVIEW OF SYSTEMS: other than symptoms associated with above-mentioned illnesses review of systems is relatively unremarkable.  Heart, lungs, GI, neuromuscular, neurological.  Review of Systems   All other systems reviewed and are negative.     See HPI for psychiatric ROS  OBJECTIVE    PHYSICAL EXAM:  Physical Exam   Constitutional: She is oriented to person, place, and time. She appears well-developed and well-nourished.   HENT:   Head: Normocephalic and atraumatic.   Eyes: Conjunctivae and EOM are normal. Pupils are equal, round, and reactive to light.   Neck: Normal range of motion. Neck supple.   Cardiovascular: Normal rate, regular rhythm, normal heart sounds and intact distal pulses.    Pulmonary/Chest: Effort normal and breath sounds normal.   Abdominal: Soft. Bowel sounds are normal.   Genitourinary:   Genitourinary Comments: deferred   Musculoskeletal: Normal range of motion.   Neurological: She is alert and oriented to person, place, and time. She has normal reflexes.   Skin: Skin is warm and dry.       MENTAL STATUS EXAM:   patient is generally cooperative with the interview although perplexed and convinced she could not talk or walk even though she was doing both.  Her affect is restricted, she says she is depressed denies a sense of hopelessness or worthlessness and denies any self-destructive thoughts or plans. No formal thought disorder demonstrated specifically citing an absence of any paranoia, ideas passivity reference or hallucinatory  "phenomena.  She repeatedly expressed a resentment for being in the nursing home and not wanting to be there and occasionally would answer questions with \"I can't remember\".  Patient was oriented to month and year but not to date or day of the week, oriented to place and person.  Recall for immediate and recent events was spotty at best, long-term memory intact.  Patient did not seem to incorporate   logic with her responses to problem solving or finding solutions to her current situation.  Her executive function was  impaired.            Imaging Results (last 24 hours)     Procedure Component Value Units Date/Time    XR Spine Lumbar Lateral [[id number]] Collected:  05/01/18 0827     Updated:  05/01/18 0829    Narrative:       EXAMINATION: XR SPINE LUMBAR LATERAL-      Technique:5 views of lumbar spine.     CLINICAL INDICATION:    Back pain.     COMPARISON:    None.     FINDINGS:    Vertebral body height and alignment are maintained. There  is mild multilevel degenerative change. No compression deformity.       Impression:       No radiographic evidence of acute compression fracture of  the lumbar spine.         This report was finalized on 5/1/2018 8:27 AM by Dr. Arik Durbin MD.       XR Chest PA & Lateral [[id number]] Collected:  04/30/18 2132     Updated:  04/30/18 2134    Narrative:       EXAMINATION: XR CHEST PA AND LATERAL-      CLINICAL INDICATION: ELECTROCONVULSIVE THERAPY          COMPARISON: 05/19/2017      TECHNIQUE: XR CHEST PA AND LATERAL-      FINDINGS:   Lungs are adequately aerated.   Heart and mediastinal contours are unremarkable.   No pneumothorax.   Bony and soft tissue structures are unremarkable.            Impression:       No radiographic evidence of acute cardiac or pulmonary disease.     This report was finalized on 4/30/2018 9:32 PM by Dr. Eddy De León MD.              ECG/EMG Results (most recent)     Procedure Component Value Units Date/Time    ECG 12 Lead [[id number]] Collected:  " 04/30/18 1627     Updated:  04/30/18 1809    Narrative:       Test Reason : Potential adverse reaction to medications.  Blood Pressure : **/** mmHG  Vent. Rate : 083 BPM     Atrial Rate : 083 BPM     P-R Int : 140 ms          QRS Dur : 078 ms      QT Int : 382 ms       P-R-T Axes : 065 054 061 degrees     QTc Int : 448 ms    Normal sinus rhythm with sinus arrhythmia  Normal ECG  When compared with ECG of 13-MAY-2017 11:11,  No significant change was found  Confirmed by Gigi Judd ([id number]) on 4/30/2018 6:09:06 PM    Referred By:  STACIE           Confirmed By:Gigi Judd           Lab Results   Component Value Date    GLUCOSE 91 04/30/2018    BUN 16 04/30/2018    CREATININE 0.99 04/30/2018    EGFRIFNONA 55 (L) 04/30/2018    BCR 16.2 04/30/2018    CO2 27.7 04/30/2018    CALCIUM 9.6 04/30/2018    ALBUMIN 4.50 04/30/2018    LABIL2 1.5 04/30/2018    AST 21 04/30/2018    ALT 14 04/30/2018       Lab Results   Component Value Date    WBC 7.73 04/30/2018    HGB 14.7 04/30/2018    HCT 43.8 04/30/2018    MCV 91.8 04/30/2018     04/30/2018       Pain Management Panel     Pain Management Panel Latest Ref Rng & Units 4/30/2018 5/13/2017    AMPHETAMINES SCREEN, URINE Negative Negative Negative    BARBITURATES SCREEN Negative Negative Positive(A)    BENZODIAZEPINE SCREEN, URINE Negative Negative Negative    BUPRENORPHINE Negative Negative Negative    COCAINE SCREEN, URINE Negative Negative Negative    METHADONE SCREEN, URINE Negative Negative Negative    METHAMPHETAMINE UR NEGATIVE ng/mL - -          Brief Urine Lab Results  (Last result in the past 365 days)      Color   Clarity   Blood   Leuk Est   Nitrite   Protein   CREAT   Urine HCG        04/30/18 2213 Yellow Clear Negative Small (1+)(A) Negative Negative               Reviewed labs and studies done with this admission.       ASSESSMENT & PLAN:      Patient Active Problem List   Diagnosis Code   • GERMAN (generalized anxiety disorder) F41.1 Plan: Low-dose  lorazepam    • Vascular dementia F01.50 Plan: The dilemma between a vascular dementia with associated depression versus a pseudodementia continues.   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     • HTN (hypertension) I10 Plan: Continue Norvasc plus hospitalist consultation    • Dry eyes H04.123 Plan: False 2 tears    • DVT (deep venous thrombosis) (History of)  I82.409 Plan:  Will continue Eliquis for now.    Patient has history of myelofibrosis as well as bone marrow transplant plus bladder tumor.      The patient has been admitted for safety and stabilization.  Patient will be monitored for suicidality daily and maintained on Suicide precaution Level 3 (q15 min checks) .  The patient will have individual and group therapy with a master's level therapist. A master treatment plan will be developed and agreed upon by the patient and his/her treatment team.  The patient's estimated length of stay in the hospital is 5-7 days.       SHAR Nguyen MD.     Dictated utilizing Dragon dictation

## 2018-05-01 NOTE — PLAN OF CARE
Problem: Patient Care Overview  Goal: Plan of Care Review  Outcome: Ongoing (interventions implemented as appropriate)   05/01/18 1429   Coping/Psychosocial   Plan of Care Reviewed With patient   Coping/Psychosocial   Patient Agreement with Plan of Care agrees   Plan of Care Review   Progress no change   OTHER   Outcome Summary Therapist met with Sourav to complete initial assessment and discharge planning. Sourav agreeable.    Coping/Psychosocial   Consent Given to Review Plan with Daughter Elysia Fallon (POA) at      Goal: Individualization and Mutuality  Outcome: Ongoing (interventions implemented as appropriate)   05/01/18 1429   Personal Strengths/Vulnerabilities   Patient Personal Strengths ability to maintain sobriety;self-reliant;socioeconomic stability;spiritual/Christian support;stable living environment;tolerant;self-awareness;resourceful;resilient;relationship stability;realistic evaluation of current/future capabilities;family/social support;expressive of needs;expressive of emotions;insight into illness/situation;motivated for treatment   Patient Vulnerabilities delusional, ineffective coping    Individualization   Patient Specific Goals (Include Timeframe) Identify healhty coping skills, discuss thinking errors, and complete safety planning during treatment    Patient Specific Interventions Therpaist will offer 1-4 therapy sessions, daily group, family educaiton, and aftercare planning    Mutuality/Individual Preferences   What Anxieties, Fears, Concerns, or Questions Do You Have About Your Care? I think that I can't walk    What Information Would Help Us Give You More Personalized Care? none   How Would You and/or Your Support Person Like to Participate in Your Care? family    Mutuality/Individual Preferences   How to Address Anxieties/Fears just everything I really don't know      Goal: Discharge Needs Assessment  Outcome: Ongoing (interventions implemented as appropriate)   05/01/18 1429   Discharge  Needs Assessment   Readmission Within the Last 30 Days no previous admission in last 30 days   Concerns to be Addressed cognitive/perceptual;coping/stress;decision making;developmental;discharge planning;medication;mental health;suicidal   Concerns Comments I don't know why I'm like this for    Patient/Family Anticipates Transition to long term care facility;home with family   Patient/Family Anticipated Services at Transition outpatient care;mental health services;rehabilitation services   Transportation Concerns car, none   Transportation Anticipated family or friend will provide   Patient's Choice of Community Agency(s) Currently Patient with [facility]    Current Discharge Risk psychiatric illness   Discharge Coordination/Progress Therapist met with Patient to discuss discharge planning and aftercare recommendations. Patient agreeable.    Discharge Needs Assessment,    Outpatient/Agency/Support Group Needs outpatient counseling;outpatient medication management;residential services   Anticipated Discharge Disposition home or self-care;skilled nursing facility (SNF)     Goal: Interprofessional Rounds/Family Conf  Outcome: Ongoing (interventions implemented as appropriate)   05/01/18 1429   Interdisciplinary Rounds/Family Conf   Summary Treatment Team Evaluations and Staffing    Interdisciplinary Rounds/Family Conf   Participants social work;patient;psychiatrist;family;other (see comments);nursing  ( Navigators, )     1400  DATA:    Therapist met individually with Patient this date for initial evaluation.  Introduced self as Therapist and the role of a positive therapeutic relationship; Patient agreeable.    Therapist encouraged Patient to speak openly and honestly about any issues or stressors during treatment stay. Therapist explained how open communication is significant to providing most effective care. Therapist completed psychosocial assessment, integrated summary, reviewed care plans, disposition  "planning and discussed hospitalization expectations and treatment goals this date. Patient agreeable for family involvement. Consent for her daughter; Elysia Fallon at [phone] who is also POA.     ASSESSMENT:   Ms. Lacie Win is a 72 year old, disabled, , , female who was referred to treatment by the [facility]. During her outpatient appointment Patient reported belief that she would be \"better off gone\" indicating SI. Patient reports increased depression, feelings of hopelessness, helplessness, and paranoia. Patient states \"I know it's silly and I know that I can but I think that I can't walk\". Patient states that she believes something is wrong with her \"thought pattern\". Patient states that her \"head is filled with anxiety and depression\". Patient also discussed her change of appetite and sleep pattern. Patient states that she has not ate \"in a long time\". Patient was unable to provide an estimated amount of sleep stating \"it's not much I know that\". Patient reported being afraid of her physical and mental state. Patient states that she believes she should \"just leave\" before her blood clots kill her.     Patient observed to have walker beside her while sitting in dayroom lobby this date. Patient appeared to have an anxiou affect and dysphoric mood. Patient indicated SI today discussing her desire to \"just leave\". Patient appeared well-groomed with organized thought content observed by having the ability to complete assessment and provide insight to emotions.     PLAN:   Patient will receive 24/7 nursing monitoring and daily psychiatrist evaluation by a multidisciplinary team.    Patient will continue stabilization at this time.     Therapist will contact Elysia Fallon for collateral and discuss dispositional planning.         "

## 2018-05-02 LAB
ALBUMIN SERPL-MCNC: 3.8 G/DL (ref 3.4–4.8)
ALBUMIN/GLOB SERPL: 1.4 G/DL (ref 1.5–2.5)
ALP SERPL-CCNC: 75 U/L (ref 35–104)
ALT SERPL W P-5'-P-CCNC: 10 U/L (ref 10–36)
ANION GAP SERPL CALCULATED.3IONS-SCNC: 7.6 MMOL/L (ref 3.6–11.2)
AST SERPL-CCNC: 19 U/L (ref 10–30)
BASOPHILS # BLD AUTO: 0.03 10*3/MM3 (ref 0–0.3)
BASOPHILS NFR BLD AUTO: 0.4 % (ref 0–2)
BILIRUB SERPL-MCNC: 0.7 MG/DL (ref 0.2–1.8)
BUN BLD-MCNC: 16 MG/DL (ref 7–21)
BUN/CREAT SERPL: 19 (ref 7–25)
CALCIUM SPEC-SCNC: 9.3 MG/DL (ref 7.7–10)
CHLORIDE SERPL-SCNC: 106 MMOL/L (ref 99–112)
CO2 SERPL-SCNC: 25.4 MMOL/L (ref 24.3–31.9)
CREAT BLD-MCNC: 0.84 MG/DL (ref 0.43–1.29)
DEPRECATED RDW RBC AUTO: 46.9 FL (ref 37–54)
EOSINOPHIL # BLD AUTO: 0.04 10*3/MM3 (ref 0–0.7)
EOSINOPHIL NFR BLD AUTO: 0.5 % (ref 0–7)
ERYTHROCYTE [DISTWIDTH] IN BLOOD BY AUTOMATED COUNT: 14.5 % (ref 11.5–14.5)
GFR SERPL CREATININE-BSD FRML MDRD: 66 ML/MIN/1.73
GLOBULIN UR ELPH-MCNC: 2.7 GM/DL
GLUCOSE BLD-MCNC: 79 MG/DL (ref 70–110)
HCT VFR BLD AUTO: 39.1 % (ref 37–47)
HGB BLD-MCNC: 13.1 G/DL (ref 12–16)
IMM GRANULOCYTES # BLD: 0.01 10*3/MM3 (ref 0–0.03)
IMM GRANULOCYTES NFR BLD: 0.1 % (ref 0–0.5)
LYMPHOCYTES # BLD AUTO: 3.54 10*3/MM3 (ref 1–3)
LYMPHOCYTES NFR BLD AUTO: 45.1 % (ref 16–46)
MCH RBC QN AUTO: 30.5 PG (ref 27–33)
MCHC RBC AUTO-ENTMCNC: 33.5 G/DL (ref 33–37)
MCV RBC AUTO: 90.9 FL (ref 80–94)
MONOCYTES # BLD AUTO: 1.04 10*3/MM3 (ref 0.1–0.9)
MONOCYTES NFR BLD AUTO: 13.2 % (ref 0–12)
NEUTROPHILS # BLD AUTO: 3.19 10*3/MM3 (ref 1.4–6.5)
NEUTROPHILS NFR BLD AUTO: 40.7 % (ref 40–75)
OSMOLALITY SERPL CALC.SUM OF ELEC: 277.6 MOSM/KG (ref 273–305)
PLATELET # BLD AUTO: 350 10*3/MM3 (ref 130–400)
PMV BLD AUTO: 11.3 FL (ref 6–10)
POTASSIUM BLD-SCNC: 3.9 MMOL/L (ref 3.5–5.3)
PROT SERPL-MCNC: 6.5 G/DL (ref 6–8)
RBC # BLD AUTO: 4.3 10*6/MM3 (ref 4.2–5.4)
SODIUM BLD-SCNC: 139 MMOL/L (ref 135–153)
WBC NRBC COR # BLD: 7.85 10*3/MM3 (ref 4.5–12.5)

## 2018-05-02 PROCEDURE — G8996 SWALLOW CURRENT STATUS: HCPCS

## 2018-05-02 PROCEDURE — G8997 SWALLOW GOAL STATUS: HCPCS

## 2018-05-02 PROCEDURE — 99221 1ST HOSP IP/OBS SF/LOW 40: CPT | Performed by: INTERNAL MEDICINE

## 2018-05-02 PROCEDURE — 85025 COMPLETE CBC W/AUTO DIFF WBC: CPT | Performed by: HOSPITALIST

## 2018-05-02 PROCEDURE — 99232 SBSQ HOSP IP/OBS MODERATE 35: CPT | Performed by: PHYSICIAN ASSISTANT

## 2018-05-02 PROCEDURE — 99232 SBSQ HOSP IP/OBS MODERATE 35: CPT | Performed by: PSYCHIATRY & NEUROLOGY

## 2018-05-02 PROCEDURE — 80053 COMPREHEN METABOLIC PANEL: CPT | Performed by: HOSPITALIST

## 2018-05-02 PROCEDURE — G8998 SWALLOW D/C STATUS: HCPCS

## 2018-05-02 PROCEDURE — 92610 EVALUATE SWALLOWING FUNCTION: CPT

## 2018-05-02 PROCEDURE — 94799 UNLISTED PULMONARY SVC/PX: CPT

## 2018-05-02 RX ADMIN — AMLODIPINE BESYLATE 10 MG: 10 TABLET ORAL at 08:38

## 2018-05-02 RX ADMIN — LORAZEPAM 1 MG: 1 TABLET ORAL at 21:19

## 2018-05-02 RX ADMIN — POLYETHYLENE GLYCOL (3350) 17 G: 17 POWDER, FOR SOLUTION ORAL at 08:38

## 2018-05-02 RX ADMIN — LORAZEPAM 0.5 MG: 0.5 TABLET ORAL at 08:38

## 2018-05-02 RX ADMIN — CARBOXYMETHYLCELLULOSE SODIUM 2 DROP: 5 SOLUTION/ DROPS OPHTHALMIC at 08:38

## 2018-05-02 NOTE — PLAN OF CARE
"Problem: Patient Care Overview  Goal: Plan of Care Review  Outcome: Ongoing (interventions implemented as appropriate)   05/01/18 9822   Coping/Psychosocial   Plan of Care Reviewed With patient   Coping/Psychosocial   Patient Agreement with Plan of Care agrees   Plan of Care Review   Progress no change   OTHER   Outcome Summary Pt reports anxiety and depression unable to rate. Denies SI HI. Reports she's \"not sure\" if shes hallucinating. Pt somatic, refused blood gas this evening d/t fear it will hurt.       Problem: Overarching Goals (Adult)  Goal: Adheres to Safety Considerations for Self and Others  Outcome: Ongoing (interventions implemented as appropriate)    Goal: Optimized Coping Skills in Response to Life Stressors  Outcome: Ongoing (interventions implemented as appropriate)    Goal: Develops/Participates in Therapeutic [place] to Support Successful Transition  Outcome: Ongoing (interventions implemented as appropriate)        "

## 2018-05-02 NOTE — PROGRESS NOTES
"Patient Identification:  Name:  Lacie Win  Age:  72 y.o.  Sex:  female  :  1945  MRN:  [id number]  Visit Number:  [id number]  Primary Care Provider:  SHERLEY Jha    Length of stay:  2  72-year-old  female initially evaluated in consult regarding hypertension and ECT clearance. Upon evaluation, she was having hemotpysis with chronic anticoagulation with Eliquis.  This was stopped on 18 evening and pulmonology consultation was also placed.      Subjective:       is eating today at the [employer] table. She is eating meatloaf and vegetables.  She reports she is no longer experiencing hemoptysis and nursing staff reports no further known episodes.  As Mrs. Win is eating her meatloaf, she reports to me that she has not been eating or drinking.  She reports that she has not been eating at the nursing home.  She reports weight loss, though noted available weights within the past 2 years are essentially unchanged.  Chart reveals she voices that she \"cannot get up, eat or swallow\".         ----------------------------------------------------------------------------------------------------------------------  Current Hospital Meds:    amLODIPine 10 mg Oral Daily   carboxymethylcellulose 2 drop Both Eyes Daily   LORazepam 0.5 mg Oral Daily   LORazepam 1 mg Oral Nightly   polyethylene glycol 17 g Oral Daily        ----------------------------------------------------------------------------------------------------------------------  Vital Signs:  Temp:  [97.8 °F (36.6 °C)-97.9 °F (36.6 °C)] 97.9 °F (36.6 °C)  Heart Rate:  [] 91  Resp:  [18] 18  BP: (125-150)/(59-97) 125/59  1    18  1510   Weight: 59 kg (130 lb)     Body mass index is 27.17 kg/m².  No intake or output data in the 24 hours ending 18 1528  No intake/output data recorded.  Diet " Regular  ----------------------------------------------------------------------------------------------------------------------  Physical exam:  Constitutional:  Well-developed and well-nourished.  No respiratory distress.      HENT:  Head:  Normocephalic and atraumatic.  Mouth:  Moist mucous membranes.    Eyes:  Conjunctivae and EOM are normal.  Pupils are equal, round, and reactive to light.  No scleral icterus.    Neck:  Neck supple.  No JVD present.    Cardiovascular:  Normal rate, regular rhythm and normal heart sounds with no murmur.  Pulmonary/Chest:  No respiratory distress, no wheezes, no crackles, with normal breath sounds and good air movement.  Abdominal:  Soft.  Bowel sounds are normal.  No distension and no tenderness.   Musculoskeletal:  Mild bilateral edema, no tenderness, and no deformity.  No red or swollen joints anywhere.    Neurological:  Alert and oriented to person, place, and time.  No cranial nerve deficit.  No tongue deviation.  No facial droop.  No slurred speech.   Skin:  Skin is warm and dry. No rash noted. No pallor.   ----------------------------------------------------------------------------------------------------------------------    ----------------------------------------------------------------------------------------------------------------------        Results from last 7 days  Lab Units 05/02/18 0512 05/01/18 1805 04/30/18  1530   WBC 10*3/mm3 7.85 8.48 7.73   HEMOGLOBIN g/dL 13.1 14.8 14.7   HEMATOCRIT % 39.1 43.7 43.8   MCV fL 90.9 90.5 91.8   MCHC g/dL 33.5 33.9 33.6   PLATELETS 10*3/mm3 350 378 377   INR   --  1.09  --            Results from last 7 days  Lab Units 05/02/18 0512 05/01/18 1805 04/30/18  1530   SODIUM mmol/L 139 136 139   POTASSIUM mmol/L 3.9 4.1 3.9   CHLORIDE mmol/L 106 100 104   CO2 mmol/L 25.4 25.8 27.7   BUN mg/dL 16 17 16   CREATININE mg/dL 0.84 0.97 0.99   EGFR IF NONAFRICN AM mL/min/1.73 66 56* 55*   CALCIUM mg/dL 9.3 10.3* 9.6   GLUCOSE mg/dL  79 117* 91   ALBUMIN g/dL 3.80  --  4.50   BILIRUBIN mg/dL 0.7  --  0.4   ALK PHOS U/L 75  --  90   AST (SGOT) U/L 19  --  21   ALT (SGPT) U/L 10  --  14   Estimated Creatinine Clearance: 47.9 mL/min (by C-G formula based on SCr of 0.84 mg/dL).  No results found for: AMMONIA                  ----------------------------------------------------------------------------------------------------------------------  Imaging Results (last 24 hours)     Procedure Component Value Units Date/Time    US Venous Doppler Lower Extremity Bilateral (duplex) [[id number]] Collected:  05/02/18 0740     Updated:  05/02/18 0743    Narrative:       EXAMINATION: US VENOUS DOPPLER LOWER EXTREMITY BILATERAL (DUPLEX)-      CLINICAL INDICATION:     Leg Pain and Swelling  Left >right lower extremity edema with history of clots      TECHNIQUE: Multiplanar gray scale and Doppler vascular sonographic  imaging of the deep veins  without and with compression.      COMPARISON: NONE      FINDINGS: The visualized deep veins demonstrate flow and are  compressible. No evidence of deep venous thrombosis.             Impression:       No DVT.     This report was finalized on 5/2/2018 7:40 AM by Dr. Arik Durbin MD.           ----------------------------------------------------------------------------------------------------------------------  Assessment and Plan:    -Hemoptysis resolved: Evaluated by Pulm with input appreciated.  Eliquis remains discontinued at present Continue to hold per Pulm recommendations.  Appreciate pulm recommendations.  Hemoglobin down slightly but stable.   CT chest recommended if hemoptysis recurs.      -Hypertension, improved with increase in Norvasc 10. Continue to monitor.      -Poor appetite:  Possibly 2/2 to underlying depression and dementia, though given concern for possible underlying swallowing difficulties an SLP evaluation has been ordered.  Mrs. Win is eating well during my examination as she does profess  difficulty eating.  Nutrition following. While Mrs. Win reports poor eating, she does eat her meals in discussion with staff.     -Dementia: MRI report previously reviewed as documented.      -Depression: Mrs. Win is cleared from Pulm standpoint per review of consultation.   In discussion, she now reports she is very hesitant to move forward with procedure.      -History of myelofibrosis      Anastasia Garnica PA-C  05/02/18  3:28 PM

## 2018-05-02 NOTE — PROGRESS NOTES
4848  Therapist contacted Patient's daughter Mary Ellen this date. Therapist discussed Patient's progress in treatment and recommendations discussed during Treatment Team staffing. Therapist recommended for Patient to receive ECT treatment here and return to [facility] upon discharge. Mary Ellen acknowledged and was agreeable but remained hopeful that Patient could return home upon discharge. Mary Ellen discussed Patient's tendency of inconsistency Mary Ellen explained that Patient would report not eating meals however when addressed with nursing staff; nurses would confirm of Patient's food intake.     Therapist discussed with Mary Ellen the need of POA documentation. Mary Ellen confirmed that she would bring documents to visitation. Mary Ellen also gave verbal consent for the Therapist to contact [facility] in efforts to maintain Patient's placement there.

## 2018-05-02 NOTE — THERAPY EVALUATION
"Acute Care - Speech Language Pathology   Swallow Initial Evaluation [facility]   CLINICAL DYSPHAGIA EVALUATION     Patient Name: Lacie Win  : 1945  MRN: [id number]  Today's Date: 2018     Admit Date: 2018    Lacie Win  is seen in the common area this pm in Psych to assess safety/efficacy of swallowing fnx, determine safest/least restrictive diet tolerance. She denies overt s/s aspiration, reports globus sensation w/ \"large pills.\"      Social History     Social History   • Marital status:      Spouse name: N/A   • Number of children: N/A   • Years of education: N/A     Occupational History   • Not on file.     Social History Main Topics   • Smoking status: Former Smoker   • Smokeless tobacco: Never Used      Comment: quit approximately 30 years ago   • Alcohol use No   • Drug use: No   • Sexual activity: No     Other Topics Concern   • Not on file     Social History Narrative   • No narrative on file      Chest xray 18 reveals the lungs are adequately aerated, no radiographic evidence of acute cardiac or pulmonary disease.     Diet Orders (active)     Start     Ordered    18 1800  Dietary Nutrition Supplements Boost Plus  Daily With Breakfast, Lunch & Dinner     Comments:  Pt asked for strawberry flavor.    B,L,D.    18 1335    18 1507  Diet Regular  Diet Effective Now      18 1508        Pt is observed on ra w/o complications.     Pt is positioned upright and centered on a cough to accept multiple po presentations of ice chips, solid cracker, puree and thin liquids via spoon, cup and straw.  She is able to self feed.     Facial/oral structures are symmetrical upon observation. Lingual protrusion reveals no deviation. Oral mucosa are moist, pink, and clean. Secretions are clear, thin, and well controlled. OROM/ESTEFANY is wfl to imitate oral postures. Gag is not assessed. Volitional cough is intact w/ adequate  intensity, clear in quality, non-productive. " Voice is adequate in intensity, clear in quality w/ intelligible speech.    Upon po presentations, adequate bolus anticipation and acceptance w/ good labial seal for bolus clearance via spoon bowl, cup rim stability and suction via straw. Bolus formation, manipulation and control are wfl w/ rotary mastication pattern. A-p transit is timely w/o oral residue. No overt s/s aspiration evidenced before the swallow.     Pharyngeal swallow is timely w/ adequate hyolaryngeal elevation per palpation. No overt s/s aspiration evidenced across this evaluation. No silent aspiration suspected as pt is w/o changes in vocal quality, respirations or secretions post po presentations. Pt denies odynophagia.    Impression: Per this evaluation, pt presents w/ wfl oropharyngeal swallow w/o s/s aspiration. No s/s indicative of silent aspiration. Continue current po diet.     Recommendations:  1. Regular consistency, thin liquids.    2. Meds whole in puree/thins. Crush in puree PRN.   3. Upright and centered for all po intake  4. MYRA precautions.  5. Oral care protocol.  No further formal SLP f/u warranted at this time.    D/w pt results and recommendations w/ verbal agreement.    D/w RN results and recommendations w/ verbal agreement.    Thank you for allowing me to participate in the care of your patient-  Christiana Delcid M.A., CCC-SLP    Visit Dx:   No diagnosis found.  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 pt unable to remember date.    • SAMAN FILTER INSERTION JUGULAR     • SKIN CANCER EXCISION     • SMALL INTESTINE SURGERY     • SPLENECTOMY     • SPLENECTOMY, PARTIAL      as part of the bone marrow transplant.    • TRANSURETHRAL RESECTION OF BLADDER TUMOR       EDUCATION  The patient has been educated in the following areas:   Dysphagia (Swallowing Impairment).    SLP Recommendation and Plan  No further SLP f/u warranted at this time.     Plan of Care Reviewed With: patient  Plan of Care Review  Plan of Care Reviewed With: patient  Progress: no change     Time Calculation:     Therapy Charges for Today     Code Description Service Date Service Provider Modifiers Qty    23512020577 HC ST SWALLOWING CURRENT STATUS 2018 Christiana Delcid MA,CCC-SLP GN,  1    77814967664 HC ST SWALLOWING PROJECTED 2018 Christiana Delcid MA,CCC-SLP GN,  1    [id number] HC ST SWALLOWING DISCHARGE 2018 Christiana Delcid MA,CCC-SLP ,  1    [id number] HC ST EVAL ORAL PHARYNG SWALLOW 3 2018 Christiana Delcid MA,CCC-SLP GN 1        SLP G-Codes  SLP NOMS Used?: Yes  Functional Limitations: Swallowing  Swallow Current Status (): 0 percent impaired, limited or restricted  Swallow Goal Status (): 0 percent impaired, limited or restricted  Swallow Discharge Status (): 0 percent impaired, limited or restricted    Christiana Delcid MA, CCC-SLP  2018

## 2018-05-02 NOTE — PLAN OF CARE
Problem: Patient Care Overview  Goal: Plan of Care Review  Outcome: Ongoing (interventions implemented as appropriate)   05/02/18 1703   Coping/Psychosocial   Plan of Care Reviewed With patient   Coping/Psychosocial   Patient Agreement with Plan of Care agrees   Plan of Care Review   Progress no change   OTHER   Outcome Summary Remains somatic. Out in dayroom most of today. Ate better today.       Problem: Overarching Goals (Adult)  Goal: Adheres to Safety Considerations for Self and Others  Outcome: Ongoing (interventions implemented as appropriate)    Goal: Optimized Coping Skills in Response to Life Stressors  Outcome: Ongoing (interventions implemented as appropriate)    Goal: Develops/Participates in Therapeutic Alice to Support Successful Transition  Outcome: Ongoing (interventions implemented as appropriate)      Problem: Fall Risk (Adult)  Goal: Identify Related Risk Factors and Signs and Symptoms  Outcome: Ongoing (interventions implemented as appropriate)    Goal: Absence of Fall  Outcome: Ongoing (interventions implemented as appropriate)

## 2018-05-02 NOTE — PLAN OF CARE
Problem: Patient Care Overview  Goal: Plan of Care Review  Outcome: Outcome(s) achieved Date Met: 05/02/18 05/02/18 1328   Coping/Psychosocial   Plan of Care Reviewed With patient   Coping/Psychosocial   Patient Agreement with Plan of Care agrees   Plan of Care Review   Progress no change    Pt participated in clinical dysphagia evaluation w/ wfl oropharyngeal swallow.

## 2018-05-02 NOTE — PLAN OF CARE
"Problem: Patient Care Overview  Goal: Interprofessional Rounds/Family Conf  Outcome: Ongoing (interventions implemented as appropriate)   05/02/18 1401   Interdisciplinary Rounds/Family Conf   Summary Treatment Team Evaluations and Staffing    Interdisciplinary Rounds/Family Conf   Participants patient;social work;psychologist;nursing;other (see comments)  ( Navigators, UR)     Data:  0930  Therapist present for Treatment Team evaluations and staffing this date. Treatment Team members discussed Patient's progress in treatment and future ECT procedures. Therapist discussed the Patient's family's desires for the Patient to be discharged home rather than to the nursing home if Patient can groom and feed herself.     1140  Therapist met with Patient in dayroom lobby this date. Patient agreeable to discuss current treatment progress and discharge concerns. Patient discussed in length the negative behaviors that she has been utlizing such as \"isolating\", \"refusing to eat\", and \"making people worry about me\". Patient reported that she is unsure as to why she thinks she can not walk or talk at times and feels \"dead\". Patient discussed being fearful at home after the passing of her spouse. Patient also discussed that her spouse has been dead for the past 4 years this April and has never addressed this loss with anyone. Patient discussed multiple times her uneasiness related to ECT stating, \"I just don't think I want to do it\". Therapist provided education and provided active listening skills to voice understanding. Patient remains hesitant of ECT but also voices the desire to \"be\" herself \"again\".    Assessment:  Patient seemed to be anxious today noted by her pattern of reviewing the environment and peers around her multiple times. Patient seemed inconsistent in information today. Patient would discuss having the inability to walk and reported her tendency to \"sit all day\" however moments later in session would discuss " her improvements in treatment discussing how she walked throughout the unit. Patient was oriented x4 today. Patient appeared casually dressed wearing pajamas. Patient denies any SI/HI at this time. Patient continues to be fixated on somatic concerns and believed mobility restrictions.      Plan:  Patient will continue stabilization. Patient will continue to receive services offered by Treatment Team.     Patient will receive outpatient sevices with [facility] post discharge.     Assistance with Transportation will not be needed. Family member will provide.

## 2018-05-02 NOTE — PROGRESS NOTES
1427  Therapist contacted [facility] this date. Per Shabana Patient has a   14 day bed hold.

## 2018-05-02 NOTE — PROGRESS NOTES
"INPATIENT PSYCHIATRIC PROGRESS NOTE    Name:  Lacie Win  :  1945  MRN:  [id number]  Visit Number:  [id number]  Length of stay:  2    Behavioral Health Treatment Plan and Problem List: I have reviewed and approved the Behavioral Health Treatment Plan and Problem list.    SUBJECTIVE  CC: \"Tired\"     INTERVAL HISTORY: remains depressed, \"mostly think I can't get up, eat, swallow\".   No voices. No paranoia. \"Sometimes feel hopeless, not going to get better\".   Appreciate the hospitalist consult and concerns - awaiting Dr. Hess's evaluation.  There has been, and continues to be that issue with pseudodementia verses early dementia with associated depression - this past year there has not been any apparent progression of dementia and current MS continues to present with perplex ion, indecision and negativity.     Depression rating not able to rate.   Anxiety rating \"don't know what happening\"   Sleep: slept well     Review of Systems   Respiratory: Negative.    Cardiovascular: Negative.    Gastrointestinal: Negative.    Musculoskeletal:        Hurt in my Knees and legs.    Neurological: Negative.          OBJECTIVE    Temp:  [97.8 °F (36.6 °C)-98.2 °F (36.8 °C)] 97.9 °F (36.6 °C)  Heart Rate:  [] 67  Resp:  [16-18] 18  BP: (125-180)/() 125/59    MENTAL STATUS EXAM:      Appearance:Casually dressed, good hygeine.   Cooperation:Cooperative  Psychomotor: No psychomotor agitation/retardation, No EPS, No motor tics  Speech-normal rate, amount.   Mood/Affect: Depressed  Thought Processes: disorganized  Thought Content: negativistic   Hallucination(s): none  Hopelessness: Yes  Optimistic:No  Suicidal Thoughts:  none  Suicidal Plan/Intent: none  Homicidal Thoughts:  absent  Orientation: oriented x 3  Memory: Immediate intact and recent intact    Lab Results (last 24 hours)     Procedure Component Value Units Date/Time    Comprehensive Metabolic Panel [[id number]]  (Abnormal) Collected:  18 0512    " Specimen:  Blood Updated:  05/02/18 0619     Glucose 79 mg/dL      BUN 16 mg/dL      Creatinine 0.84 mg/dL      Sodium 139 mmol/L      Potassium 3.9 mmol/L      Chloride 106 mmol/L      CO2 25.4 mmol/L      Calcium 9.3 mg/dL      Total Protein 6.5 g/dL      Albumin 3.80 g/dL      ALT (SGPT) 10 U/L      AST (SGOT) 19 U/L      Alkaline Phosphatase 75 U/L      Comment: Note New Reference Ranges        Total Bilirubin 0.7 mg/dL      eGFR Non African Amer 66 mL/min/1.73      Globulin 2.7 gm/dL      A/G Ratio 1.4 (L) g/dL      BUN/Creatinine Ratio 19.0     Anion Gap 7.6 mmol/L     Narrative:       The MDRD GFR formula is only valid for adults with stable renal function between ages 18 and 70.    Osmolality, Calculated [[id number]]  (Normal) Collected:  05/02/18 0512    Specimen:  Blood Updated:  05/02/18 0619     Osmolality Calc 277.6 mOsm/kg     CBC & Differential [[id number]] Collected:  05/02/18 0512    Specimen:  Blood Updated:  05/02/18 0555    Narrative:       The following orders were created for panel order CBC & Differential.  Procedure                               Abnormality         Status                     ---------                               -----------         ------                     CBC Auto Differential[[id number]]        Abnormal            Final result                 Please view results for these tests on the individual orders.    CBC Auto Differential [[id number]]  (Abnormal) Collected:  05/02/18 0512    Specimen:  Blood Updated:  05/02/18 0555     WBC 7.85 10*3/mm3      RBC 4.30 10*6/mm3      Hemoglobin 13.1 g/dL      Hematocrit 39.1 %      MCV 90.9 fL      MCH 30.5 pg      MCHC 33.5 g/dL      RDW 14.5 %      RDW-SD 46.9 fl      MPV 11.3 (H) fL      Platelets 350 10*3/mm3      Neutrophil % 40.7 %      Lymphocyte % 45.1 %      Monocyte % 13.2 (H) %      Eosinophil % 0.5 %      Basophil % 0.4 %      Immature Grans % 0.1 %      Neutrophils, Absolute 3.19 10*3/mm3      Lymphocytes, Absolute 3.54 (H)  10*3/mm3      Monocytes, Absolute 1.04 (H) 10*3/mm3      Eosinophils, Absolute 0.04 10*3/mm3      Basophils, Absolute 0.03 10*3/mm3      Immature Grans, Absolute 0.01 10*3/mm3     Basic Metabolic Panel [[id number]]  (Abnormal) Collected:  05/01/18 1805    Specimen:  Blood Updated:  05/01/18 1855     Glucose 117 (H) mg/dL      BUN 17 mg/dL      Creatinine 0.97 mg/dL      Sodium 136 mmol/L      Potassium 4.1 mmol/L      Chloride 100 mmol/L      CO2 25.8 mmol/L      Calcium 10.3 (H) mg/dL      eGFR Non African Amer 56 (L) mL/min/1.73      BUN/Creatinine Ratio 17.5     Anion Gap 10.2 mmol/L     Narrative:       The MDRD GFR formula is only valid for adults with stable renal function between ages 18 and 70.    Osmolality, Calculated [[id number]]  (Normal) Collected:  05/01/18 1805    Specimen:  Blood Updated:  05/01/18 1855     Osmolality Calc 274.5 mOsm/kg     Protime-INR [[id number]]  (Normal) Collected:  05/01/18 1805    Specimen:  Blood Updated:  05/01/18 1823     Protime 14.2 Seconds      Comment: Note new Reference Range        INR 1.09    Narrative:       Suggested INR therapeutic range for stable oral anticoagulant therapy:    Low Intensity therapy:   1.5-2.0  Moderate Intensity therapy:   2.0-3.0  High Intensity therapy:   2.5-4.0    CBC & Differential [[id number]] Collected:  05/01/18 1805    Specimen:  Blood Updated:  05/01/18 1814    Narrative:       The following orders were created for panel order CBC & Differential.  Procedure                               Abnormality         Status                     ---------                               -----------         ------                     CBC Auto Differential[[id number]]        Abnormal            Final result                 Please view results for these tests on the individual orders.    CBC Auto Differential [[id number]]  (Abnormal) Collected:  05/01/18 1805    Specimen:  Blood Updated:  05/01/18 1814     WBC 8.48 10*3/mm3      RBC 4.83 10*6/mm3       Hemoglobin 14.8 g/dL      Hematocrit 43.7 %      MCV 90.5 fL      MCH 30.6 pg      MCHC 33.9 g/dL      RDW 13.9 %      RDW-SD 45.8 fl      MPV 10.8 (H) fL      Platelets 378 10*3/mm3      Neutrophil % 77.5 (H) %      Lymphocyte % 16.4 %      Monocyte % 5.8 %      Eosinophil % 0.0 %      Basophil % 0.2 %      Immature Grans % 0.1 %      Neutrophils, Absolute 6.57 (H) 10*3/mm3      Lymphocytes, Absolute 1.39 10*3/mm3      Monocytes, Absolute 0.49 10*3/mm3      Eosinophils, Absolute 0.00 10*3/mm3      Basophils, Absolute 0.02 10*3/mm3      Immature Grans, Absolute 0.01 10*3/mm3            Imaging Results (last 24 hours)     Procedure Component Value Units Date/Time    US Venous Doppler Lower Extremity Bilateral (duplex) [[id number]] Collected:  05/02/18 0740     Updated:  05/02/18 0743    Narrative:       EXAMINATION: US VENOUS DOPPLER LOWER EXTREMITY BILATERAL (DUPLEX)-      CLINICAL INDICATION:     Leg Pain and Swelling  Left >right lower extremity edema with history of clots      TECHNIQUE: Multiplanar gray scale and Doppler vascular sonographic  imaging of the deep veins  without and with compression.      COMPARISON: NONE      FINDINGS: The visualized deep veins demonstrate flow and are  compressible. No evidence of deep venous thrombosis.             Impression:       No DVT.     This report was finalized on 5/2/2018 7:40 AM by Dr. Arik Durbin MD.              ECG/EMG Results (most recent)     Procedure Component Value Units Date/Time    ECG 12 Lead [[id number]] Collected:  04/30/18 1627     Updated:  04/30/18 1809    Narrative:       Test Reason : Potential adverse reaction to medications.  Blood Pressure : **/** mmHG  Vent. Rate : 083 BPM     Atrial Rate : 083 BPM     P-R Int : 140 ms          QRS Dur : 078 ms      QT Int : 382 ms       P-R-T Axes : 065 054 061 degrees     QTc Int : 448 ms    Normal sinus rhythm with sinus arrhythmia  Normal ECG  When compared with ECG of 13-MAY-2017 11:11,  No significant  change was found  Confirmed by Gigi Judd ([id number]) on 4/30/2018 6:09:06 PM    Referred By:  STACIE           Confirmed By:Gigi Judd           ALLERGIES: Ciprofloxacin; Lamotrigine; Risperidone and related; Seroquel [quetiapine fumarate]; Sulfa antibiotics; Zoloft [sertraline hcl]; and Penicillins      Current Facility-Administered Medications:   •  acetaminophen (TYLENOL) tablet 487.5 mg, 487.5 mg, Oral, Q6H PRN, Kalpesh Nguyen MD  •  aluminum-magnesium hydroxide-simethicone (MAALOX MAX) 400-400-40 MG/5ML suspension 15 mL, 15 mL, Oral, Q6H PRN, Kalpesh Nguyen MD  •  amLODIPine (NORVASC) tablet 10 mg, 10 mg, Oral, Daily, Anastasia Garnica PA-C  •  benzonatate (TESSALON) capsule 100 mg, 100 mg, Oral, TID PRN, Kalpesh Nguyen MD  •  benztropine (COGENTIN) tablet 1 mg, 1 mg, Oral, Daily PRN **OR** benztropine (COGENTIN) injection 0.5 mg, 0.5 mg, Intramuscular, Daily PRN, Kalpesh Nguyen MD  •  carboxymethylcellulose (REFRESH PLUS) 0.5 % ophthalmic solution 2 drop, 2 drop, Both Eyes, Daily, Kalpesh Nguyen MD, 2 drop at 05/01/18 0839  •  hydrOXYzine (ATARAX) tablet 50 mg, 50 mg, Oral, Q6H PRN, Kalpesh Nguyen MD  •  ipratropium-albuterol (DUO-NEB) nebulizer solution 3 mL, 3 mL, Nebulization, Q6H PRN, Kalpesh Nguyen MD  •  LORazepam (ATIVAN) tablet 0.5 mg, 0.5 mg, Oral, Daily, Kalpesh Nguyen MD  •  LORazepam (ATIVAN) tablet 1 mg, 1 mg, Oral, Nightly, Kalpesh Nguyen MD, 1 mg at 05/01/18 2114  •  magnesium hydroxide (MILK OF MAGNESIA) suspension 2400 mg/10mL 10 mL, 10 mL, Oral, Daily PRN, Kalpesh Nguyen MD  •  polyethylene glycol 3350 powder (packet), 17 g, Oral, Daily, Kalpesh Nguyen MD, 17 g at 05/01/18 0838  •  traZODone (DESYREL) tablet 50 mg, 50 mg, Oral, Nightly PRN, Kalpesh Nguyen MD    ASSESSMENT & PLAN    Diagnosis Code   • GERMAN (generalized anxiety disorder) F41.1 Plan: Low-dose  lorazepam    • Vascular dementia F01.50 Plan: The dilemma between a vascular dementia with associated depression versus a pseudodementia continues.   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     • HTN (hypertension) I10 Plan: Continue Norvasc plus hospitalist consultation    • Dry eyes H04.123 Plan: False 2 tears    • DVT (deep venous thrombosis) (History of)  I82.409 Plan:  Will continue Eliquis for now.    Patient has history of myelofibrosis as well as bone marrow transplant plus bladder tumor.           Suicide precautions: Suicide precaution Level 3 (q15 min checks)     Behavioral Health Treatment Plan and Problem List: I have reviewed and approved the Behavioral Health Treatment Plan and Problem list.    Clinician:  Kalpesh Nguyen MD  05/02/18  8:32 AM    Dictated utilizing Dragon dictation

## 2018-05-03 PROCEDURE — 93306 TTE W/DOPPLER COMPLETE: CPT

## 2018-05-03 PROCEDURE — 94799 UNLISTED PULMONARY SVC/PX: CPT

## 2018-05-03 PROCEDURE — 99231 SBSQ HOSP IP/OBS SF/LOW 25: CPT | Performed by: INTERNAL MEDICINE

## 2018-05-03 PROCEDURE — 99232 SBSQ HOSP IP/OBS MODERATE 35: CPT | Performed by: PSYCHIATRY & NEUROLOGY

## 2018-05-03 PROCEDURE — 99232 SBSQ HOSP IP/OBS MODERATE 35: CPT | Performed by: PHYSICIAN ASSISTANT

## 2018-05-03 RX ADMIN — CARBOXYMETHYLCELLULOSE SODIUM 2 DROP: 5 SOLUTION/ DROPS OPHTHALMIC at 08:38

## 2018-05-03 RX ADMIN — LORAZEPAM 1 MG: 1 TABLET ORAL at 20:30

## 2018-05-03 RX ADMIN — POLYETHYLENE GLYCOL (3350) 17 G: 17 POWDER, FOR SOLUTION ORAL at 08:38

## 2018-05-03 RX ADMIN — AMLODIPINE BESYLATE 10 MG: 10 TABLET ORAL at 08:38

## 2018-05-03 RX ADMIN — LORAZEPAM 0.5 MG: 0.5 TABLET ORAL at 08:38

## 2018-05-03 NOTE — PROGRESS NOTES
Patient Identification:  Name:  Lacie Win  Age:  72 y.o.  Sex:  female  :  1945  MRN:  [id number]  Visit Number:  [id number]  Primary Care Provider:  SHERLEY Jha    Length of stay:  3    72-year-old  female initially evaluated in consult regarding hypertension and ECT clearance. Upon evaluation, she was having hemotpysis with chronic anticoagulation with Eliquis.  This was stopped on 18 evening and pulmonology consultation was also placed.     Subjective:      Mrs. Win is sitting in day room again during evaluation. She did ambulate there independently and has been eating her meals.  She continues to report she does not walk or eat.   She denies chest pain.  She reports periodic dyspnea at times but denies any currently.  No further episodes of hemoptysis reported.      ----------------------------------------------------------------------------------------------------------------------  Current Hospital Meds:    amLODIPine 10 mg Oral Daily   carboxymethylcellulose 2 drop Both Eyes Daily   LORazepam 0.5 mg Oral Daily   LORazepam 1 mg Oral Nightly   polyethylene glycol 17 g Oral Daily        ----------------------------------------------------------------------------------------------------------------------  Vital Signs:  Temp:  [97.4 °F (36.3 °C)-98 °F (36.7 °C)] 97.4 °F (36.3 °C)  Heart Rate:  [66-86] 86  Resp:  [18] 18  BP: ()/(59-62) 109/59  1    18  1510   Weight: 59 kg (130 lb)     Body mass index is 27.17 kg/m².  No intake or output data in the 24 hours ending 18 1610  No intake/output data recorded.  Diet Regular  ----------------------------------------------------------------------------------------------------------------------  Physical exam:  Constitutional:  Well-developed and well-nourished.  No respiratory distress.      HENT:  Head:  Normocephalic and atraumatic.  Mouth:  Moist mucous membranes.    Eyes:  Conjunctivae and EOM are normal.  Pupils  are equal, round, and reactive to light.  No scleral icterus.    Neck:  Neck supple.  No JVD present.    Cardiovascular:  Normal rate, regular rhythm and normal heart sounds with no murmur.  Pulmonary/Chest:  No respiratory distress, no wheezes, no crackles, with normal breath sounds and good air movement.  Abdominal:  Soft.  Bowel sounds are normal.  No distension and no tenderness.   Musculoskeletal:  No edema, no tenderness, and no deformity.  No red or swollen joints anywhere.    Neurological:  Alert and oriented to person, place, and time.  No cranial nerve deficit.  No tongue deviation.  No facial droop.  No slurred speech.   Skin:  Skin is warm and dry. No rash noted. No pallor.   ----------------------------------------------------------------------------------------------------------------------  ----------------------------------------------------------------------------------------------------------------------        Results from last 7 days  Lab Units 05/02/18  0512 05/01/18  1805 04/30/18  1530   WBC 10*3/mm3 7.85 8.48 7.73   HEMOGLOBIN g/dL 13.1 14.8 14.7   HEMATOCRIT % 39.1 43.7 43.8   MCV fL 90.9 90.5 91.8   MCHC g/dL 33.5 33.9 33.6   PLATELETS 10*3/mm3 350 378 377   INR   --  1.09  --            Results from last 7 days  Lab Units 05/02/18 0512 05/01/18  1805 04/30/18  1530   SODIUM mmol/L 139 136 139   POTASSIUM mmol/L 3.9 4.1 3.9   CHLORIDE mmol/L 106 100 104   CO2 mmol/L 25.4 25.8 27.7   BUN mg/dL 16 17 16   CREATININE mg/dL 0.84 0.97 0.99   EGFR IF NONAFRICN AM mL/min/1.73 66 56* 55*   CALCIUM mg/dL 9.3 10.3* 9.6   GLUCOSE mg/dL 79 117* 91   ALBUMIN g/dL 3.80  --  4.50   BILIRUBIN mg/dL 0.7  --  0.4   ALK PHOS U/L 75  --  90   AST (SGOT) U/L 19  --  21   ALT (SGPT) U/L 10  --  14   Estimated Creatinine Clearance: 47.9 mL/min (by C-G formula based on SCr of 0.84 mg/dL).  No results found for: AMMONIA                   ----------------------------------------------------------------------------------------------------------------------  Imaging Results (last 24 hours)     ** No results found for the last 24 hours. **        ----------------------------------------------------------------------------------------------------------------------  Assessment and Plan:    -Hemoptysis, resolved:  Evaluated by Pulm with input appreciated.  Eliquis remains discontinued with recommendation to hold for 1-3 more days per review of pulmonology input.  Will repeat AM labs.      -Pre-ECT clearance:  Echocardiogram ordered.  Awaiting reading prior to clearance.  Previously cleared from Pulmonology standpoint. Thyroid studies reviewed.  CXR and lumbar spinal xrays unremarkable. EKG reviewed and found to be unremarkable. Brain MRI from January 2018 reviewed and included within this note. Would not proceed to ECT until echo evaluated    -Poor appetite: Dietary following.  SLP evaluation unremarkable per review.  Dietary following.     -Depression: Care per attending with possible ECT treatments.      -History of myelofibrosis    Anastasia Garnica PA-C  05/03/18  4:10 PM

## 2018-05-03 NOTE — PLAN OF CARE
Problem: Patient Care Overview  Goal: Plan of Care Review  Outcome: Ongoing (interventions implemented as appropriate)  PATIENT VERBALIZES A/D THIS SHIFT. PATIENT APPEARS TO BE PARANOID AND DELUSIONAL.NEW ORDERS: TRANSTHORACIC ECHO, BMP, CBC WITH DIFF.   05/03/18 1442   Coping/Psychosocial   Plan of Care Reviewed With patient   Coping/Psychosocial   Patient Agreement with Plan of Care agrees   Plan of Care Review   Progress no change       Problem: Overarching Goals (Adult)  Goal: Adheres to Safety Considerations for Self and Others  Outcome: Ongoing (interventions implemented as appropriate)    Goal: Optimized Coping Skills in Response to Life Stressors  Outcome: Ongoing (interventions implemented as appropriate)    Goal: Develops/Participates in Therapeutic [place] to Support Successful Transition  Outcome: Ongoing (interventions implemented as appropriate)      Problem: Fall Risk (Adult)  Goal: Identify Related Risk Factors and Signs and Symptoms  Outcome: Ongoing (interventions implemented as appropriate)    Goal: Absence of Fall  Outcome: Ongoing (interventions implemented as appropriate)

## 2018-05-03 NOTE — PROGRESS NOTES
"1300  Data:  Therapist met with Patient today in dayroom lob. Therapist engaged Patient inquiring about family visitation yesterday evening. Patient was unable to identify if the visit was positive reporting that she was unsure. Patient did state that she grabbed her daughter's face several times during the visit to ensure that she was not dreaming. Patient states that she remains fearful at times and believes that she is getting worse.     Assessment:  Patient continues to be inconsistent and will discuss walking in the unit and then when asked to clarify will deny her previous state and begin to explain she doesn't know how to walk. Patient remained in her pajamas and discussed her \"poor\" appearance.     Plan:  Patient will continue stabilization.     ECT treatment is uncertain at this time until medically cleared.     Patient entered treatment on 4/30/18 with a bed hold of 14 days in treatment. the 14th day falls on 5/13/18; which is a Sunday.   "

## 2018-05-03 NOTE — PLAN OF CARE
"Problem: Patient Care Overview  Goal: Plan of Care Review  Outcome: Ongoing (interventions implemented as appropriate)   05/03/18 0015   Coping/Psychosocial   Plan of Care Reviewed With patient   Coping/Psychosocial   Patient Agreement with Plan of Care agrees   Plan of Care Review   Progress no change   OTHER   Outcome Summary Pt remains somatic. Denies anxiety depression SI HI hallucinations. Pt walked out to dayroom and asked for help back to room stating \"I don't think I can walk\"       Problem: Overarching Goals (Adult)  Goal: Adheres to Safety Considerations for Self and Others  Outcome: Ongoing (interventions implemented as appropriate)    Goal: Optimized Coping Skills in Response to Life Stressors  Outcome: Ongoing (interventions implemented as appropriate)    Goal: Develops/Participates in Therapeutic [place] to Support Successful Transition  Outcome: Ongoing (interventions implemented as appropriate)        "

## 2018-05-03 NOTE — PROGRESS NOTES
LOS: 3 days     Chief Complaint:  Pulmonology is following for hemoptysis    Subjective     Interval History: Ms. Win is resting in bed. She denies any hemoptysis today.     History taken from: patient chart RN    Review of Systems:     Review of Systems - History obtained from chart review and the patient  General ROS: negative for - chills, fatigue or fever  Psychological ROS: negative for - anxiety or depression  ENT ROS: negative for - headaches, visual changes or vocal changes  Allergy and Immunology ROS: negative for - nasal congestion, postnasal drip or seasonal allergies  Endocrine ROS: negative for - polydipsia/polyuria  Respiratory ROS: no cough, shortness of breath, or wheezing  Cardiovascular ROS: no chest pain or dyspnea on exertion  Gastrointestinal ROS: no abdominal pain, change in bowel habits, or black or bloody stools  Musculoskeletal ROS: negative for - joint pain, joint stiffness or joint swelling  Neurological ROS: no TIA or stroke symptoms                    Objective     Vital Signs  Temp:  [97.4 °F (36.3 °C)-98 °F (36.7 °C)] 97.4 °F (36.3 °C)  Heart Rate:  [66-86] 86  Resp:  [18] 18  BP: ()/(59-62) 109/59  Body mass index is 27.17 kg/m².  No intake or output data in the 24 hours ending 05/03/18 1524  No intake/output data recorded.    Physical Exam:  GENERAL APPEARANCE: Well developed, well nourished, alert and cooperative, and appears to be in no acute distress.    HEAD: normocephalic.    EYES: PERRL    NECK: Neck supple.     CARDIAC: Normal S1 and S2. No S3, S4 or murmurs. Rhythm is regular. There is no peripheral edema, cyanosis or pallor. Extremities are warm and well perfused. Capillary refill is less than 2 seconds. No carotid bruits.    RESPIRATORY: Clear to auscultation and percussion without rales, rhonchi, wheezing or diminished breath sounds.    GI: Positive bowel sounds. Soft, nondistended, nontender.     MUSCULOSKELTAL: No significant deformity or joint abnormality. No  edema. Peripheral pulses intact.     NEUROLOGICAL: Strength and sensation symmetric and intact throughout.     PSYCHIATRIC: The mental examination revealed the patient was oriented to person, place, and time.                 Results Review:                I reviewed the patient's new clinical results.  I reviewed the patient's new imaging results and agree with the interpretation.    Results from last 7 days  Lab Units 05/02/18  0512 05/01/18  1805 04/30/18  1530   WBC 10*3/mm3 7.85 8.48 7.73   HEMOGLOBIN g/dL 13.1 14.8 14.7   PLATELETS 10*3/mm3 350 378 377       Results from last 7 days  Lab Units 05/02/18  0512 05/01/18  1805 04/30/18  1530   SODIUM mmol/L 139 136 139   POTASSIUM mmol/L 3.9 4.1 3.9   CHLORIDE mmol/L 106 100 104   CO2 mmol/L 25.4 25.8 27.7   BUN mg/dL 16 17 16   CREATININE mg/dL 0.84 0.97 0.99   CALCIUM mg/dL 9.3 10.3* 9.6   GLUCOSE mg/dL 79 117* 91     Lab Results   Component Value Date    INR 1.09 05/01/2018    INR 1.99 01/23/2015    INR 1.20 01/08/2015    PROTIME 14.2 05/01/2018    PROTIME 22.3 (H) 01/23/2015    PROTIME 13.4 (H) 01/08/2015       Results from last 7 days  Lab Units 05/02/18  0512 04/30/18  1530   ALK PHOS U/L 75 90   BILIRUBIN mg/dL 0.7 0.4   ALT (SGPT) U/L 10 14   AST (SGOT) U/L 19 21         Imaging Results (last 24 hours)     ** No results found for the last 24 hours. **             Medication Review:   Scheduled Medications:    amLODIPine 10 mg Oral Daily   carboxymethylcellulose 2 drop Both Eyes Daily   LORazepam 0.5 mg Oral Daily   LORazepam 1 mg Oral Nightly   polyethylene glycol 17 g Oral Daily     Continuous infusions:       Assessment/Plan      Hemoptysis: No hemoptysis reported today.  Continue to hold Eliquis for the next 1-3 days and then start it back.  Continue to monitor closely for bleeding.    Pulmonary and critical care will sign off of case.  Please call with any further questions or concerns.  If patient has hemoptysis again please give us a call.    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 Hess, by SHERLEY Castillo      I, Yariel Hess M.D. attest that the above note accurately reflects the work and decisions made  by me.  Patient was seen and evaluated by Dr. Hess, including history of present illness, physical exam, assessment, and treatment plan.  The above note was reviewed and edited by Dr. Hess.

## 2018-05-04 LAB
ANION GAP SERPL CALCULATED.3IONS-SCNC: 7.8 MMOL/L (ref 3.6–11.2)
BASOPHILS # BLD AUTO: 0.03 10*3/MM3 (ref 0–0.3)
BASOPHILS NFR BLD AUTO: 0.4 % (ref 0–2)
BH CV ECHO MEAS - % IVS THICK: 15.1 %
BH CV ECHO MEAS - % LVPW THICK: 56.4 %
BH CV ECHO MEAS - ACS: 1.9 CM
BH CV ECHO MEAS - AO MAX PG: 9.7 MMHG
BH CV ECHO MEAS - AO MEAN PG: 4.7 MMHG
BH CV ECHO MEAS - AO ROOT AREA (BSA CORRECTED): 1.9
BH CV ECHO MEAS - AO ROOT AREA: 6.4 CM^2
BH CV ECHO MEAS - AO ROOT DIAM: 2.9 CM
BH CV ECHO MEAS - AO V2 MAX: 156.1 CM/SEC
BH CV ECHO MEAS - AO V2 MEAN: 96 CM/SEC
BH CV ECHO MEAS - AO V2 VTI: 27.7 CM
BH CV ECHO MEAS - BSA(HAYCOCK): 1.6 M^2
BH CV ECHO MEAS - BSA: 1.5 M^2
BH CV ECHO MEAS - BZI_BMI: 27.2 KILOGRAMS/M^2
BH CV ECHO MEAS - BZI_METRIC_HEIGHT: 147.3 CM
BH CV ECHO MEAS - BZI_METRIC_WEIGHT: 59 KG
BH CV ECHO MEAS - CONTRAST EF 4CH: 57.1 ML/M^2
BH CV ECHO MEAS - EDV(CUBED): 60 ML
BH CV ECHO MEAS - EDV(MOD-SP4): 21 ML
BH CV ECHO MEAS - EDV(TEICH): 66.5 ML
BH CV ECHO MEAS - EF(CUBED): 70.4 %
BH CV ECHO MEAS - EF(MOD-SP4): 57.1 %
BH CV ECHO MEAS - EF(TEICH): 62.7 %
BH CV ECHO MEAS - ESV(CUBED): 17.7 ML
BH CV ECHO MEAS - ESV(MOD-SP4): 9 ML
BH CV ECHO MEAS - ESV(TEICH): 24.8 ML
BH CV ECHO MEAS - FS: 33.4 %
BH CV ECHO MEAS - IVS/LVPW: 1
BH CV ECHO MEAS - IVSD: 0.96 CM
BH CV ECHO MEAS - IVSS: 1.1 CM
BH CV ECHO MEAS - LA DIMENSION: 2.1 CM
BH CV ECHO MEAS - LA/AO: 0.74
BH CV ECHO MEAS - LV DIASTOLIC VOL/BSA (35-75): 13.8 ML/M^2
BH CV ECHO MEAS - LV MASS(C)D: 111.7 GRAMS
BH CV ECHO MEAS - LV MASS(C)DI: 73.7 GRAMS/M^2
BH CV ECHO MEAS - LV MASS(C)S: 98.7 GRAMS
BH CV ECHO MEAS - LV MASS(C)SI: 65.1 GRAMS/M^2
BH CV ECHO MEAS - LV SYSTOLIC VOL/BSA (12-30): 5.9 ML/M^2
BH CV ECHO MEAS - LVIDD: 3.9 CM
BH CV ECHO MEAS - LVIDS: 2.6 CM
BH CV ECHO MEAS - LVLD AP4: 4.8 CM
BH CV ECHO MEAS - LVLS AP4: 5.1 CM
BH CV ECHO MEAS - LVOT AREA (M): 3.1 CM^2
BH CV ECHO MEAS - LVOT AREA: 3.1 CM^2
BH CV ECHO MEAS - LVOT DIAM: 2 CM
BH CV ECHO MEAS - LVPWD: 0.91 CM
BH CV ECHO MEAS - LVPWS: 1.4 CM
BH CV ECHO MEAS - MV A MAX VEL: 96.7 CM/SEC
BH CV ECHO MEAS - MV E MAX VEL: 48.4 CM/SEC
BH CV ECHO MEAS - MV E/A: 0.5
BH CV ECHO MEAS - PA ACC SLOPE: 1606 CM/SEC^2
BH CV ECHO MEAS - PA ACC TIME: 0.06 SEC
BH CV ECHO MEAS - PA PR(ACCEL): 50.5 MMHG
BH CV ECHO MEAS - RAP SYSTOLE: 10 MMHG
BH CV ECHO MEAS - RVSP: 32.1 MMHG
BH CV ECHO MEAS - SI(AO): 117.5 ML/M^2
BH CV ECHO MEAS - SI(CUBED): 27.8 ML/M^2
BH CV ECHO MEAS - SI(MOD-SP4): 7.9 ML/M^2
BH CV ECHO MEAS - SI(TEICH): 27.5 ML/M^2
BH CV ECHO MEAS - SV(AO): 178.2 ML
BH CV ECHO MEAS - SV(CUBED): 42.2 ML
BH CV ECHO MEAS - SV(MOD-SP4): 12 ML
BH CV ECHO MEAS - SV(TEICH): 41.7 ML
BH CV ECHO MEAS - TR MAX VEL: 234.8 CM/SEC
BUN BLD-MCNC: 22 MG/DL (ref 7–21)
BUN/CREAT SERPL: 21.8 (ref 7–25)
CALCIUM SPEC-SCNC: 9.4 MG/DL (ref 7.7–10)
CHLORIDE SERPL-SCNC: 106 MMOL/L (ref 99–112)
CO2 SERPL-SCNC: 24.2 MMOL/L (ref 24.3–31.9)
CREAT BLD-MCNC: 1.01 MG/DL (ref 0.43–1.29)
DEPRECATED RDW RBC AUTO: 46.3 FL (ref 37–54)
EOSINOPHIL # BLD AUTO: 0.07 10*3/MM3 (ref 0–0.7)
EOSINOPHIL NFR BLD AUTO: 1 % (ref 0–7)
ERYTHROCYTE [DISTWIDTH] IN BLOOD BY AUTOMATED COUNT: 14.3 % (ref 11.5–14.5)
GFR SERPL CREATININE-BSD FRML MDRD: 54 ML/MIN/1.73
GLUCOSE BLD-MCNC: 101 MG/DL (ref 70–110)
HCT VFR BLD AUTO: 42.5 % (ref 37–47)
HGB BLD-MCNC: 14.5 G/DL (ref 12–16)
IMM GRANULOCYTES # BLD: 0.01 10*3/MM3 (ref 0–0.03)
IMM GRANULOCYTES NFR BLD: 0.1 % (ref 0–0.5)
LYMPHOCYTES # BLD AUTO: 3.39 10*3/MM3 (ref 1–3)
LYMPHOCYTES NFR BLD AUTO: 46.4 % (ref 16–46)
MAXIMAL PREDICTED HEART RATE: 147 BPM
MCH RBC QN AUTO: 30.9 PG (ref 27–33)
MCHC RBC AUTO-ENTMCNC: 34.1 G/DL (ref 33–37)
MCV RBC AUTO: 90.4 FL (ref 80–94)
MONOCYTES # BLD AUTO: 0.83 10*3/MM3 (ref 0.1–0.9)
MONOCYTES NFR BLD AUTO: 11.4 % (ref 0–12)
NEUTROPHILS # BLD AUTO: 2.97 10*3/MM3 (ref 1.4–6.5)
NEUTROPHILS NFR BLD AUTO: 40.7 % (ref 40–75)
OSMOLALITY SERPL CALC.SUM OF ELEC: 279.1 MOSM/KG (ref 273–305)
PLATELET # BLD AUTO: 373 10*3/MM3 (ref 130–400)
PMV BLD AUTO: 11.2 FL (ref 6–10)
POTASSIUM BLD-SCNC: 3.9 MMOL/L (ref 3.5–5.3)
RBC # BLD AUTO: 4.7 10*6/MM3 (ref 4.2–5.4)
SODIUM BLD-SCNC: 138 MMOL/L (ref 135–153)
STRESS TARGET HR: 125 BPM
WBC NRBC COR # BLD: 7.3 10*3/MM3 (ref 4.5–12.5)

## 2018-05-04 PROCEDURE — 80048 BASIC METABOLIC PNL TOTAL CA: CPT | Performed by: PHYSICIAN ASSISTANT

## 2018-05-04 PROCEDURE — 99233 SBSQ HOSP IP/OBS HIGH 50: CPT | Performed by: INTERNAL MEDICINE

## 2018-05-04 PROCEDURE — 99232 SBSQ HOSP IP/OBS MODERATE 35: CPT | Performed by: PSYCHIATRY & NEUROLOGY

## 2018-05-04 PROCEDURE — 85025 COMPLETE CBC W/AUTO DIFF WBC: CPT | Performed by: PHYSICIAN ASSISTANT

## 2018-05-04 PROCEDURE — 94799 UNLISTED PULMONARY SVC/PX: CPT

## 2018-05-04 RX ORDER — AMLODIPINE BESYLATE 5 MG/1
5 TABLET ORAL DAILY
Status: DISCONTINUED | OUTPATIENT
Start: 2018-05-05 | End: 2018-05-05

## 2018-05-04 RX ADMIN — ACETAMINOPHEN 487.5 MG: 325 TABLET ORAL at 08:31

## 2018-05-04 RX ADMIN — LORAZEPAM 1 MG: 1 TABLET ORAL at 21:55

## 2018-05-04 RX ADMIN — LORAZEPAM 0.5 MG: 0.5 TABLET ORAL at 08:28

## 2018-05-04 RX ADMIN — CARBOXYMETHYLCELLULOSE SODIUM 2 DROP: 5 SOLUTION/ DROPS OPHTHALMIC at 08:28

## 2018-05-04 RX ADMIN — POLYETHYLENE GLYCOL (3350) 17 G: 17 POWDER, FOR SOLUTION ORAL at 08:28

## 2018-05-04 RX ADMIN — AMLODIPINE BESYLATE 10 MG: 10 TABLET ORAL at 08:28

## 2018-05-04 NOTE — PLAN OF CARE
Problem: Patient Care Overview  Goal: Plan of Care Review  Outcome: Ongoing (interventions implemented as appropriate)    Goal: Individualization and Mutuality  Outcome: Ongoing (interventions implemented as appropriate)    Goal: Discharge Needs Assessment  Outcome: Ongoing (interventions implemented as appropriate)    Goal: Interprofessional Rounds/Family Conf  Outcome: Ongoing (interventions implemented as appropriate)      Problem: Overarching Goals (Adult)  Goal: Adheres to Safety Considerations for Self and Others  Outcome: Ongoing (interventions implemented as appropriate)    Goal: Optimized Coping Skills in Response to Life Stressors  Outcome: Ongoing (interventions implemented as appropriate)    Goal: Develops/Participates in Therapeutic [place] to Support Successful Transition  Outcome: Ongoing (interventions implemented as appropriate)      Problem: Mood Impairment (Depressive Signs/Symptoms) (Adult)  Goal: Improved Mood Symptoms (Depressive Signs/Symptoms)  Outcome: Ongoing (interventions implemented as appropriate)      Problem: Mood Impairment (Anxiety Signs/Symptoms) (Adult)  Goal: Improved Mood Symptoms (Anxiety Signs/Symptoms)  Outcome: Ongoing (interventions implemented as appropriate)      Problem: Fall Risk (Adult)  Goal: Identify Related Risk Factors and Signs and Symptoms  Outcome: Ongoing (interventions implemented as appropriate)    Goal: Absence of Fall  Outcome: Ongoing (interventions implemented as appropriate)

## 2018-05-04 NOTE — PROGRESS NOTES
[id number]  Therapist contacted Patient's daughter Elysia this date and discussed awaiting medical clearance for ECT but is anticipated to begin on Monday if permitted. Elysia voiced understanding and reports the eagerness for Patient return home and plans to visit her this weekend.

## 2018-05-04 NOTE — PLAN OF CARE
"Problem: Patient Care Overview  Goal: Plan of Care Review  Outcome: Ongoing (interventions implemented as appropriate)  When asked to rate her anxiety and depression, patient replied: \"I don't want to. I want to go home.\" Asking if she would ever get better. Is very nervous and afraid. States she can't walk or stand, but she did. Refused h.s. Snack. Hesitant in taking h.s. meds, stating she didn't believe she could swallow them, but was able to without problem. During evening hours patient would yell from her room not to leave her there. Appeared to sleep well this shift.   05/04/18 6271   Coping/Psychosocial   Plan of Care Reviewed With patient   Coping/Psychosocial   Patient Agreement with Plan of Care agrees   Plan of Care Review   Progress no change       Problem: Overarching Goals (Adult)  Goal: Adheres to Safety Considerations for Self and Others  Outcome: Ongoing (interventions implemented as appropriate)    Goal: Optimized Coping Skills in Response to Life Stressors  Outcome: Ongoing (interventions implemented as appropriate)    Goal: Develops/Participates in Therapeutic [place] to Support Successful Transition  Outcome: Ongoing (interventions implemented as appropriate)        "

## 2018-05-04 NOTE — PROGRESS NOTES
Patient Identification:  Name:  Lacie Win  Age:  72 y.o.  Sex:  female  :  1945  MRN:  [id number]  Visit Number:  [id number]  Primary Care Provider:  SHERLEY Jha    I have seen the patient in conjunction with Anastasia Salazar PA-C, and I agree with the following statements:    Length of stay:  4    72-year-old  female initially evaluated in consult regarding hypertension and ECT clearance. Upon evaluation, she was having hemotpysis with chronic anticoagulation with Eliquis.  This was stopped on 18 evening and pulmonology consultation was also placed. Given dyspnea, echocardiogram has also been ordered prior to ECT clearance.     Subjective:    Mrs. Win is sitting in the day room. She is currently sitting.  She does not have complaints today other than periodic dry eyes for which she has supplemental eye drops. She has not had further episodes of hemoptysis.      Note that Dr. Velasquez saw the patient on 2018 and Anastasia saw the patient on 2018.  Essentially, the patient stated to Dr. Velasquez that she could not walk and had not eaten in weeks but is seen by the psychiatry staff to be walking around the hallways and is witnessed to be eating food and drinking drinks.  ----------------------------------------------------------------------------------------------------------------------  \[facility] [place] [facility]\"" Meds:  [START ON 2018] amLODIPine 5 mg Oral Daily   carboxymethylcellulose 2 drop Both Eyes Daily   LORazepam 0.5 mg Oral Daily   LORazepam 1 mg Oral Nightly   polyethylene glycol 17 g Oral Daily   ----------------------------------------------------------------------------------------------------------------------  Vital Signs:  Temp:  [97.7 °F (36.5 °C)-98.2 °F (36.8 °C)] 98.2 °F (36.8 °C)  Heart Rate:  [] 88  Resp:  [16-18] 18  BP: ()/(61-83) 126/70  1    18  1510   Weight: 59 kg (130 lb)     Body mass index is 27.17 kg/m².     Diet Regular  NPO  Diet after midmight on Monday for ECT.  ----------------------------------------------------------------------------------------------------------------------  Physical exam:  Constitutional: Elderly appearing. No acute distress.     HENT:  Head:  Normocephalic and atraumatic.  Mouth:  Moist mucous membranes.    Eyes:  Conjunctivae and EOM are normal.  Pupils are equal, round, and reactive to light.  No scleral icterus.    Neck:  Neck supple.  No JVD present.    Cardiovascular:  Normal rate, regular rhythm and normal heart sounds with no murmur.  Pulmonary/Chest:  No respiratory distress, no wheezes, no crackles, with normal breath sounds and good air movement.  Abdominal:  Soft.  Bowel sounds are normal.  No distension and no tenderness.   Musculoskeletal:  No edema, no tenderness, and no deformity.  No red or swollen joints anywhere.    Neurological:  Alert and oriented to person, place, and time.  No cranial nerve deficit.  No tongue deviation.  No facial droop.  No slurred speech.   Skin:  Skin is warm and dry. No rash noted. No pallor.   ----------------------------------------------------------------------------------------------------------------------  Results from last 7 days  Lab Units 05/04/18  0818 05/02/18  0512 05/01/18  1805   WBC 10*3/mm3 7.30 7.85 8.48   HEMOGLOBIN g/dL 14.5 13.1 14.8   HEMATOCRIT % 42.5 39.1 43.7   MCV fL 90.4 90.9 90.5   MCHC g/dL 34.1 33.5 33.9   PLATELETS 10*3/mm3 373 350 378   INR   --   --  1.09     Results from last 7 days  Lab Units 05/04/18  0818 05/02/18  0512 05/01/18  1805 04/30/18  1530   SODIUM mmol/L 138 139 136 139   POTASSIUM mmol/L 3.9 3.9 4.1 3.9   CHLORIDE mmol/L 106 106 100 104   CO2 mmol/L 24.2* 25.4 25.8 27.7   BUN mg/dL 22* 16 17 16   CREATININE mg/dL 1.01 0.84 0.97 0.99   EGFR IF NONAFRICN AM mL/min/1.73 54* 66 56* 55*   CALCIUM mg/dL 9.4 9.3 10.3* 9.6   GLUCOSE mg/dL 101 79 117* 91   ALBUMIN g/dL  --  3.80  --  4.50   BILIRUBIN mg/dL  --  0.7  --  0.4   ALK  PHOS U/L  --  75  --  90   AST (SGOT) U/L  --  19  --  21   ALT (SGPT) U/L  --  10  --  14   Estimated Creatinine Clearance: 39.9 mL/min (by C-G formula based on SCr of 1.01 mg/dL).    I have personally looked at the labs and they are stated above.  ----------------------------------------------------------------------------------------------------------------------    Results for orders placed during the hospital encounter of 04/30/18   Adult Transthoracic Echo Complete W/ Cont if Necessary Per Protocol    Narrative · Estimated EF appears to be in the range of 56 - 60%. Normal left   ventricular cavity size and wall thickness noted. All left ventricular   wall segments contract normally. Left ventricular diastolic dysfunction is   noted (grade I) consistent with impaired relaxation.     No significant valvular heart disease noted.           ----------------------------------------------------------------------------------------------------------------------  Assessment and Plan:  -Hemoptysis, resolved  -Pre-ECT clearance  -Poor appetite  -Depression  -Essential hypertension  -History of myelofibrosis    The hemoptysis has resolved and the patient has been evaluated by pulmonology.  Eliquis will be restarted after being held for 72 hours per pulmonology recommendations with close observation to see if any further hemoptysis occurs.  For the ECT clearance, the patient was cleared 1 year ago but ECT had not been performed that time.  Echocardiogram was reviewed as were the pulmonology consults.  From a sedation standpoint, the patient is low risk for being sedated for the ECT.  The patient's poor appetite may be due to her depression and psychiatry is managing her depression medication.  As far as her essential hypertension, the Norvasc was decreased to 5 mg on 5/4/2018 due to systolic blood pressures been in the 90 range.  Currently, blood pressures are 115/64; I will again cut the Norvasc down to 2.5 mg daily and  see how her blood pressures respond.      Thank you for the consult.  We will follow the patient peripherally.    Anastasia Garnica PA-C  05/04/18  2:54 PM    Bryan Velasquez MD  05/05/18  8:42 AM

## 2018-05-04 NOTE — PLAN OF CARE
Problem: Patient Care Overview  Goal: Plan of Care Review  Outcome: Ongoing (interventions implemented as appropriate)    Goal: Individualization and Mutuality  Outcome: Ongoing (interventions implemented as appropriate)    Goal: Discharge Needs Assessment  Outcome: Ongoing (interventions implemented as appropriate)    Goal: Interprofessional Rounds/Family Conf  Outcome: Ongoing (interventions implemented as appropriate)      Problem: Overarching Goals (Adult)  Goal: Adheres to Safety Considerations for Self and Others  Outcome: Ongoing (interventions implemented as appropriate)    Goal: Optimized Coping Skills in Response to Life Stressors  Outcome: Ongoing (interventions implemented as appropriate)    Goal: Develops/Participates in Therapeutic [place] to Support Successful Transition  Outcome: Ongoing (interventions implemented as appropriate)      Problem: Mood Impairment (Depressive Signs/Symptoms) (Adult)  Goal: Improved Mood Symptoms (Depressive Signs/Symptoms)  Outcome: Ongoing (interventions implemented as appropriate)      Problem: Mood Impairment (Anxiety Signs/Symptoms) (Adult)  Goal: Improved Mood Symptoms (Anxiety Signs/Symptoms)  Outcome: Ongoing (interventions implemented as appropriate)      Problem: Fall Risk (Adult)  Goal: Identify Related Risk Factors and Signs and Symptoms  Outcome: Ongoing (interventions implemented as appropriate)    Goal: Absence of Fall  Outcome: Ongoing (interventions implemented as appropriate)

## 2018-05-04 NOTE — PROGRESS NOTES
"INPATIENT PSYCHIATRIC PROGRESS NOTE    Name:  Lacie Win  :  1945  MRN:  [id number]  Visit Number:  [id number]  Length of stay:  4    Behavioral Health Treatment Plan and Problem List: I have reviewed and approved the Behavioral Health Treatment Plan and Problem list.    SUBJECTIVE  CC: \"I feel tired\"     INTERVAL HISTORY: Talks about feeling hopeless, wanting to get well so she can go home with her family not understanding \"how I got this way\". Should not her recall for immediate, recent and recent remote intact. Clock face done almost perfectly.     Awaiting decision on medical clearance for ECT. Will go ahead and schedule for Monday, easier to cancel than to schedule. No problem canceling if need be.   Also need clarification about if and when to restart the Eliquis.     Depression rating \"don't know\" /10  Anxiety rating \"don't know\" /10  Sleep: 6-7 hours    MMSE today: 32  Clock face yesterday: normal       Review of Systems   Respiratory: Negative.    Cardiovascular: Negative.    Gastrointestinal: Negative.    Musculoskeletal: Negative.    Neurological:        Patient did have a course tremor right hand this AM that hadn't been note previously.          OBJECTIVE    Temp:  [97.7 °F (36.5 °C)-98.2 °F (36.8 °C)] 98.2 °F (36.8 °C)  Heart Rate:  [] 115  Resp:  [16-18] 18  BP: ()/(61-83) 93/61    MENTAL STATUS EXAM:      Appearance:Casually dressed, good hygeine.   Cooperation:Cooperative  Psychomotor: No psychomotor agitation/retardation, No EPS, No motor tics  Speech-normal rate, amount.   Mood/Affect: anxious, worried  Thought Processes: associations intact  Thought Content: distorted, negative, contents she can not do basic functions such as eating or walking.   Hallucination(s): none  Hopelessness: Yes  Optimistic:No  Suicidal Thoughts:  none  Suicidal Plan/Intent: none  Homicidal Thoughts:  absent  Orientation: oriented x 3  Memory: Immediate, recent, recent remote, remote " intact    Lab Results (last 24 hours)     Procedure Component Value Units Date/Time    Basic Metabolic Panel [[id number]]  (Abnormal) Collected:  05/04/18 0818    Specimen:  Blood Updated:  05/04/18 0913     Glucose 101 mg/dL      BUN 22 (H) mg/dL      Creatinine 1.01 mg/dL      Sodium 138 mmol/L      Potassium 3.9 mmol/L      Chloride 106 mmol/L      CO2 24.2 (L) mmol/L      Calcium 9.4 mg/dL      eGFR Non African Amer 54 (L) mL/min/1.73      BUN/Creatinine Ratio 21.8     Anion Gap 7.8 mmol/L     Narrative:       The MDRD GFR formula is only valid for adults with stable renal function between ages 18 and 70.    Osmolality, Calculated [[id number]]  (Normal) Collected:  05/04/18 0818    Specimen:  Blood Updated:  05/04/18 0913     Osmolality Calc 279.1 mOsm/kg     CBC & Differential [[id number]] Collected:  05/04/18 0818    Specimen:  Blood Updated:  05/04/18 0853    Narrative:       The following orders were created for panel order CBC & Differential.  Procedure                               Abnormality         Status                     ---------                               -----------         ------                     CBC Auto Differential[[id number]]        Abnormal            Final result                 Please view results for these tests on the individual orders.    CBC Auto Differential [[id number]]  (Abnormal) Collected:  05/04/18 0818    Specimen:  Blood Updated:  05/04/18 0853     WBC 7.30 10*3/mm3      RBC 4.70 10*6/mm3      Hemoglobin 14.5 g/dL      Hematocrit 42.5 %      MCV 90.4 fL      MCH 30.9 pg      MCHC 34.1 g/dL      RDW 14.3 %      RDW-SD 46.3 fl      MPV 11.2 (H) fL      Platelets 373 10*3/mm3      Neutrophil % 40.7 %      Lymphocyte % 46.4 (H) %      Monocyte % 11.4 %      Eosinophil % 1.0 %      Basophil % 0.4 %      Immature Grans % 0.1 %      Neutrophils, Absolute 2.97 10*3/mm3      Lymphocytes, Absolute 3.39 (H) 10*3/mm3      Monocytes, Absolute 0.83 10*3/mm3      Eosinophils, Absolute 0.07  10*3/mm3      Basophils, Absolute 0.03 10*3/mm3      Immature Grans, Absolute 0.01 10*3/mm3            Imaging Results (last 24 hours)     ** No results found for the last 24 hours. **           ECG/EMG Results (most recent)     Procedure Component Value Units Date/Time    ECG 12 Lead [[id number]] Collected:  04/30/18 1627     Updated:  04/30/18 1809    Narrative:       Test Reason : Potential adverse reaction to medications.  Blood Pressure : **/** mmHG  Vent. Rate : 083 BPM     Atrial Rate : 083 BPM     P-R Int : 140 ms          QRS Dur : 078 ms      QT Int : 382 ms       P-R-T Axes : 065 054 061 degrees     QTc Int : 448 ms    Normal sinus rhythm with sinus arrhythmia  Normal ECG  When compared with ECG of 13-MAY-2017 11:11,  No significant change was found  Confirmed by Gigi Judd ([id number]) on 4/30/2018 6:09:06 PM    Referred By:  STACIE           Confirmed By:Gigi Judd    Adult Transthoracic Echo Complete W/ Cont if Necessary Per Protocol [[id number]] Collected:  05/03/18 1306     Updated:  05/03/18 1426     BSA 1.5 m^2      IVSd 0.96 cm      IVSs 1.1 cm      LVIDd 3.9 cm      LVIDs 2.6 cm      LVPWd 0.91 cm      BH CV ECHO HERMINIA - LVPWS 1.4 cm      IVS/LVPW 1.0     FS 33.4 %      EDV(Teich) 66.5 ml      ESV(Teich) 24.8 ml      EF(Teich) 62.7 %      EDV(cubed) 60.0 ml      ESV(cubed) 17.7 ml      EF(cubed) 70.4 %      % IVS thick 15.1 %      % LVPW thick 56.4 %      LV mass(C)d 111.7 grams      LV mass(C)dI 73.7 grams/m^2      LV mass(C)s 98.7 grams      LV mass(C)sI 65.1 grams/m^2      SV(Teich) 41.7 ml      SI(Teich) 27.5 ml/m^2      SV(cubed) 42.2 ml      SI(cubed) 27.8 ml/m^2      Ao root diam 2.9 cm      Ao root area 6.4 cm^2      ACS 1.9 cm      LA dimension 2.1 cm      LA/Ao 0.74     LVOT diam 2.0 cm      LVOT area 3.1 cm^2      LVOT area(traced) 3.1 cm^2      LVLd ap4 4.8 cm      EDV(MOD-sp4) 21.0 ml      LVLs ap4 5.1 cm      ESV(MOD-sp4) 9.0 ml      EF(MOD-sp4) 57.1 %      SV(MOD-sp4) 12.0 ml       SI(MOD-sp4) 7.9 ml/m^2      Ao root area (BSA corrected) 1.9     CONTRAST EF 4CH 57.1 ml/m^2      LV Diastolic corrected for BSA 13.8 ml/m^2      LV Systolic corrected for BSA 5.9 ml/m^2      MV E max selina 48.4 cm/sec      MV A max selina 96.7 cm/sec      MV E/A 0.5     Ao pk selina 156.1 cm/sec      Ao max PG 9.7 mmHg      Ao V2 mean 96.0 cm/sec      Ao mean PG 4.7 mmHg      Ao V2 VTI 27.7 cm      SV(Ao) 178.2 ml      SI(Ao) 117.5 ml/m^2      PA acc slope 1,606 cm/sec^2      PA acc time 0.06 sec      TR max selina 234.8 cm/sec      RVSP(TR) 32.1 mmHg      RAP systole 10.0 mmHg      PA pr(Accel) 50.5 mmHg       CV ECHO HERMINIA - BZI_BMI 27.2 kilograms/m^2       CV ECHO HERMINIA - BSA([place]AY[place]OCK) 1.6 m^2       CV ECHO HERMINIA - BZI_METRIC_WEIGHT 59.0 kg       CV ECHO HERMINIA - BZI_METRIC_HEIGHT 147.3 cm      Target HR (85%) 125 bpm      Max. Pred. HR (100%) 147 bpm            ALLERGIES: Ciprofloxacin; Lamotrigine; Risperidone and related; Seroquel [quetiapine fumarate]; Sulfa antibiotics; Zoloft [sertraline hcl]; and Penicillins      Current Facility-Administered Medications:   •  acetaminophen (TYLENOL) tablet 487.5 mg, 487.5 mg, Oral, Q6H PRN, Kalpesh Nguyen MD, 487.5 mg at 05/04/18 0831  •  aluminum-magnesium hydroxide-simethicone (MAALOX MAX) 400-400-40 MG/5ML suspension 15 mL, 15 mL, Oral, Q6H PRN, Kalpesh Nguyen MD  •  amLODIPine (NORVASC) tablet 10 mg, 10 mg, Oral, Daily, Anastasia Garnica PA-C, 10 mg at 05/04/18 0828  •  benzonatate (TESSALON) capsule 100 mg, 100 mg, Oral, TID PRN, Kalpesh Nguyen MD  •  benztropine (COGENTIN) tablet 1 mg, 1 mg, Oral, Daily PRN **OR** benztropine (COGENTIN) injection 0.5 mg, 0.5 mg, Intramuscular, Daily PRN, Kalpesh Nguyen MD  •  carboxymethylcellulose (REFRESH PLUS) 0.5 % ophthalmic solution 2 drop, 2 drop, Both Eyes, Daily, Kalpesh Nguyen MD, 2 drop at 05/04/18 0828  •  hydrOXYzine (ATARAX) tablet 50 mg, 50 mg, Oral, Q6H PRN, Kalpesh  Han Nguyen MD  •  ipratropium-albuterol (DUO-NEB) nebulizer solution 3 mL, 3 mL, Nebulization, Q6H PRN, Kalpesh Nguyen MD  •  LORazepam (ATIVAN) tablet 0.5 mg, 0.5 mg, Oral, Daily, Kalpesh Nguyen MD, 0.5 mg at 05/04/18 0828  •  LORazepam (ATIVAN) tablet 1 mg, 1 mg, Oral, Nightly, Kalpesh Nguyen MD, 1 mg at 05/03/18 2030  •  magnesium hydroxide (MILK OF MAGNESIA) suspension 2400 mg/10mL 10 mL, 10 mL, Oral, Daily PRN, Kalpesh Nguyen MD  •  polyethylene glycol 3350 powder (packet), 17 g, Oral, Daily, Kalpesh Nguyen MD, 17 g at 05/04/18 0828  •  traZODone (DESYREL) tablet 50 mg, 50 mg, Oral, Nightly PRN, Kalpesh Nguyen MD    ASSESSMENT & PLAN     GERMAN (generalized anxiety disorder) F41.1 Plan: Low-dose lorazepam    •     • Severe episode of recurrent major depressive disorder, 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 .     • HTN (hypertension) I10 Plan: Continue Norvasc plus hospitalist consultation    • Dry eyes H04.123 Plan: False 2 tears    • DVT (deep venous thrombosis) (History of)  I82.409 Plan: Will hold the Eliquis till Sunday per consultant's recommendation.            Suicide precautions: Suicide precaution Level 3 (q15 min checks)     Behavioral Health Treatment Plan and Problem List: I have reviewed and approved the Behavioral Health Treatment Plan and Problem list.    Clinician:  Kalpesh Nguyen MD  05/04/18  9:18 AM    Dictated utilizing Dragon dictation

## 2018-05-04 NOTE — PROGRESS NOTES
"1320  Data:  Therapist met with Patient in dayroom lobby this date. Therapist engaged Patient inquiring about treatment progress and present emotions. Patient continuously responded \"I'm not sure\" or \"I just don't know I wish I did\". Patient did not provide any additional insight this date regarding treatment progress and stressed her desire to be discharged due to her fear of being alone on unit.     Assessment:  Patient presented herself to be dressed in pajamas sitting in dayroom without walker present. Patient unable to express emotions effectively at this time. Patient denies any SI/HI.     Plan:  Treatment Team awaiting medical clearance for ECT procedures.   Patient will continue stabilization this date.     "

## 2018-05-05 PROCEDURE — 99232 SBSQ HOSP IP/OBS MODERATE 35: CPT | Performed by: PSYCHIATRY & NEUROLOGY

## 2018-05-05 RX ORDER — AMLODIPINE BESYLATE 5 MG/1
2.5 TABLET ORAL DAILY
Status: DISCONTINUED | OUTPATIENT
Start: 2018-05-06 | End: 2018-05-10 | Stop reason: HOSPADM

## 2018-05-05 RX ADMIN — AMLODIPINE BESYLATE 5 MG: 5 TABLET ORAL at 08:04

## 2018-05-05 RX ADMIN — LORAZEPAM 1 MG: 1 TABLET ORAL at 20:47

## 2018-05-05 RX ADMIN — APIXABAN 5 MG: 5 TABLET, FILM COATED ORAL at 20:47

## 2018-05-05 RX ADMIN — APIXABAN 5 MG: 5 TABLET, FILM COATED ORAL at 08:04

## 2018-05-05 RX ADMIN — POLYETHYLENE GLYCOL (3350) 17 G: 17 POWDER, FOR SOLUTION ORAL at 08:04

## 2018-05-05 RX ADMIN — LORAZEPAM 0.5 MG: 0.5 TABLET ORAL at 08:05

## 2018-05-05 RX ADMIN — CARBOXYMETHYLCELLULOSE SODIUM 2 DROP: 5 SOLUTION/ DROPS OPHTHALMIC at 08:04

## 2018-05-05 NOTE — PLAN OF CARE
Problem: Patient Care Overview  Goal: Plan of Care Review  Outcome: Ongoing (interventions implemented as appropriate)  Pt. seemed less anxious and afraid this shift. She stated she did have anxiety and depression, but that she was better. Unable to rate, stating she just felt tired. Oriented x3. Stayed in room all of shift. Declined to come out for snack. Appeared to sleep well this shift.   05/05/18 0444   Coping/Psychosocial   Plan of Care Reviewed With patient   Coping/Psychosocial   Patient Agreement with Plan of Care agrees   Plan of Care Review   Progress improving        Problem: Overarching Goals (Adult)  Goal: Adheres to Safety Considerations for Self and Others  Outcome: Ongoing (interventions implemented as appropriate)    Goal: Optimized Coping Skills in Response to Life Stressors  Outcome: Ongoing (interventions implemented as appropriate)    Goal: Develops/Participates in Therapeutic [place] to Support Successful Transition  Outcome: Ongoing (interventions implemented as appropriate)

## 2018-05-05 NOTE — PLAN OF CARE
Problem: Patient Care Overview  Goal: Plan of Care Review  Outcome: Ongoing (interventions implemented as appropriate)  PATIENT REFU   05/05/18 1440   Coping/Psychosocial   Plan of Care Reviewed With patient   Coping/Psychosocial   Patient Agreement with Plan of Care agrees   Plan of Care Review   Progress no change   SED TO COOPERATE WITH AM ASSESSMENT. PATIENTS CONDITION APPEARS TO REMAIN UNCHANGED. PATIENT TO HAVE ECT Monday 5/7. NO NEW ORDERS NOTED.    Problem: Overarching Goals (Adult)  Goal: Adheres to Safety Considerations for Self and Others  Outcome: Ongoing (interventions implemented as appropriate)    Goal: Optimized Coping Skills in Response to Life Stressors  Outcome: Ongoing (interventions implemented as appropriate)    Goal: Develops/Participates in Therapeutic [place] to Support Successful Transition  Outcome: Ongoing (interventions implemented as appropriate)      Problem: Fall Risk (Adult)  Goal: Identify Related Risk Factors and Signs and Symptoms  Outcome: Ongoing (interventions implemented as appropriate)    Goal: Absence of Fall  Outcome: Ongoing (interventions implemented as appropriate)

## 2018-05-05 NOTE — PROGRESS NOTES
"INPATIENT PSYCHIATRIC PROGRESS NOTE    Name:  Lacie Win  :  1945  MRN:  [id number]  Visit Number:  [id number]  Length of stay:  5    Behavioral Health Treatment Plan and Problem List: I have reviewed and approved the Behavioral Health Treatment Plan and Problem list.    SUBJECTIVE  CC: \"cant eat\"     INTERVAL HISTORY: Continues to be confused and delusional. States that she cannot eat or walk.    Depression rating \"don't know\" /10  Anxiety rating \"don't know\" /10  Sleep: 6-7 hours    MMSE on : 32  Clock face on 5/3: normal       Review of Systems   Constitutional: Positive for fatigue.   Respiratory: Negative.    Cardiovascular: Negative.    Gastrointestinal: Negative.    Musculoskeletal: Negative.    Neurological: Positive for weakness.         OBJECTIVE    Temp:  [97.6 °F (36.4 °C)-97.9 °F (36.6 °C)] 97.6 °F (36.4 °C)  Heart Rate:  [64-76] 76  Resp:  [16-18] 16  BP: (115-116)/(60-64) 115/64    MENTAL STATUS EXAM:      Appearance:Casually dressed, good hygeine.   Cooperation:Cooperative  Psychomotor: No psychomotor agitation/retardation, No EPS, No motor tics  Speech-normal rate, amount.   Mood/Affect: anxious, worried  Thought Processes: associations intact  Thought Content: distorted, negative, contents she can not do basic functions such as eating or walking.   Hallucination(s): none  Hopelessness: Yes  Optimistic:No  Suicidal Thoughts:  none  Suicidal Plan/Intent: none  Homicidal Thoughts:  absent  Orientation: oriented x 3  Memory: Immediate, recent, recent remote, remote intact    Lab Results (last 24 hours)     ** No results found for the last 24 hours. **           Imaging Results (last 24 hours)     ** No results found for the last 24 hours. **           ECG/EMG Results (most recent)     Procedure Component Value Units Date/Time    ECG 12 Lead [[id number]] Collected:  187     Updated:  18    Narrative:       Test Reason : Potential adverse reaction to " medications.  Blood Pressure : **/** mmHG  Vent. Rate : 083 BPM     Atrial Rate : 083 BPM     P-R Int : 140 ms          QRS Dur : 078 ms      QT Int : 382 ms       P-R-T Axes : 065 054 061 degrees     QTc Int : 448 ms    Normal sinus rhythm with sinus arrhythmia  Normal ECG  When compared with ECG of 13-MAY-2017 11:11,  No significant change was found  Confirmed by Gigi Judd ([id number]) on 4/30/2018 6:09:06 PM    Referred By:  STACIE           Confirmed By:Gigi Judd    Adult Transthoracic Echo Complete W/ Cont if Necessary Per Protocol [[id number]] Collected:  05/03/18 1306     Updated:  05/04/18 1427     BSA 1.5 m^2      IVSd 0.96 cm      IVSs 1.1 cm      LVIDd 3.9 cm      LVIDs 2.6 cm      LVPWd 0.91 cm      BH CV ECHO HERMINIA - LVPWS 1.4 cm      IVS/LVPW 1.0     FS 33.4 %      EDV(Teich) 66.5 ml      ESV(Teich) 24.8 ml      EF(Teich) 62.7 %      EDV(cubed) 60.0 ml      ESV(cubed) 17.7 ml      EF(cubed) 70.4 %      % IVS thick 15.1 %      % LVPW thick 56.4 %      LV mass(C)d 111.7 grams      LV mass(C)dI 73.7 grams/m^2      LV mass(C)s 98.7 grams      LV mass(C)sI 65.1 grams/m^2      SV(Teich) 41.7 ml      SI(Teich) 27.5 ml/m^2      SV(cubed) 42.2 ml      SI(cubed) 27.8 ml/m^2      Ao root diam 2.9 cm      Ao root area 6.4 cm^2      ACS 1.9 cm      LA dimension 2.1 cm      LA/Ao 0.74     LVOT diam 2.0 cm      LVOT area 3.1 cm^2      LVOT area(traced) 3.1 cm^2      LVLd ap4 4.8 cm      EDV(MOD-sp4) 21.0 ml      LVLs ap4 5.1 cm      ESV(MOD-sp4) 9.0 ml      EF(MOD-sp4) 57.1 %      SV(MOD-sp4) 12.0 ml      SI(MOD-sp4) 7.9 ml/m^2      Ao root area (BSA corrected) 1.9     CONTRAST EF 4CH 57.1 ml/m^2      LV Diastolic corrected for BSA 13.8 ml/m^2      LV Systolic corrected for BSA 5.9 ml/m^2      MV E max selina 48.4 cm/sec      MV A max selina 96.7 cm/sec      MV E/A 0.5     Ao pk selina 156.1 cm/sec      Ao max PG 9.7 mmHg      Ao V2 mean 96.0 cm/sec      Ao mean PG 4.7 mmHg      Ao V2 VTI 27.7 cm      SV(Ao) 178.2 ml       SI(Ao) 117.5 ml/m^2      PA acc slope 1,606 cm/sec^2      PA acc time 0.06 sec      TR max selina 234.8 cm/sec      RVSP(TR) 32.1 mmHg      RAP systole 10.0 mmHg      PA pr(Accel) 50.5 mmHg       CV ECHO HERMINIA - BZI_BMI 27.2 kilograms/m^2       CV ECHO HERMINIA - BSA(HAYCOCK) 1.6 m^2       CV ECHO HERMINIA - BZI_METRIC_WEIGHT 59.0 kg       CV ECHO HERMINIA - BZI_METRIC_HEIGHT 147.3 cm      Target HR (85%) 125 bpm      Max. Pred. HR (100%) 147 bpm     Narrative:         · Estimated EF appears to be in the range of 56 - 60%. Normal left   ventricular cavity size and wall thickness noted. All left ventricular   wall segments contract normally. Left ventricular diastolic dysfunction is   noted (grade I) consistent with impaired relaxation.     No significant valvular heart disease noted.             ALLERGIES: Ciprofloxacin; Lamotrigine; Risperidone and related; Seroquel [quetiapine fumarate]; Sulfa antibiotics; Zoloft [sertraline hcl]; and Penicillins      Current Facility-Administered Medications:   •  acetaminophen (TYLENOL) tablet 487.5 mg, 487.5 mg, Oral, Q6H PRN, Kalpesh Nguyen MD, 487.5 mg at 05/04/18 0831  •  aluminum-magnesium hydroxide-simethicone (MAALOX MAX) 400-400-40 MG/5ML suspension 15 mL, 15 mL, Oral, Q6H PRN, Kalpesh Nguyen MD  •  [START ON 5/6/2018] amLODIPine (NORVASC) tablet 2.5 mg, 2.5 mg, Oral, Daily, Bryan Velasquez MD  •  apixaban (ELIQUIS) tablet 5 mg, 5 mg, Oral, Q12H, Anastasia Garnica PA-C, 5 mg at 05/05/18 0804  •  benzonatate (TESSALON) capsule 100 mg, 100 mg, Oral, TID PRN, Kalpesh Nguyen MD  •  benztropine (COGENTIN) tablet 1 mg, 1 mg, Oral, Daily PRN **OR** benztropine (COGENTIN) injection 0.5 mg, 0.5 mg, Intramuscular, Daily PRN, Kalpesh Nguyen MD  •  carboxymethylcellulose (REFRESH PLUS) 0.5 % ophthalmic solution 2 drop, 2 drop, Both Eyes, Daily, Kalpesh Nguyen MD, 2 drop at 05/05/18 0804  •  hydrOXYzine (ATARAX) tablet 50 mg, 50 mg,  Oral, Q6H PRN, Kalpesh Nguyen MD  •  LORazepam (ATIVAN) tablet 0.5 mg, 0.5 mg, Oral, Daily, Kalpesh Nguyen MD, 0.5 mg at 05/05/18 0805  •  LORazepam (ATIVAN) tablet 1 mg, 1 mg, Oral, Nightly, Kalpesh Nguyen MD, 1 mg at 05/04/18 2155  •  magnesium hydroxide (MILK OF MAGNESIA) suspension 2400 mg/10mL 10 mL, 10 mL, Oral, Daily PRN, Kalpesh Nguyen MD  •  polyethylene glycol 3350 powder (packet), 17 g, Oral, Daily, Kalpesh Nguyen MD, 17 g at 05/05/18 0804  •  traZODone (DESYREL) tablet 50 mg, 50 mg, Oral, Nightly PRN, Kalpesh Nguyen MD    ASSESSMENT & PLAN     GERMAN (generalized anxiety disorder) F41.1 Plan: Low-dose lorazepam    •     • Severe episode of recurrent major depressive disorder, 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 .     • HTN (hypertension) I10 Plan: Continue Norvasc plus hospitalist consultation    • Dry eyes H04.123 Plan: False 2 tears    • DVT (deep venous thrombosis) (History of)  I82.409 Plan: Will hold the Eliquis till Sunday per consultant's recommendation.            Suicide precautions: Suicide precaution Level 3 (q15 min checks)     Behavioral Health Treatment Plan and Problem List: I have reviewed and approved the Behavioral Health Treatment Plan and Problem list.    Clinician:  Dianna Mccray MD  05/05/18  12:43 PM    Dictated utilizing Dragon dictation

## 2018-05-06 PROCEDURE — 99232 SBSQ HOSP IP/OBS MODERATE 35: CPT | Performed by: PSYCHIATRY & NEUROLOGY

## 2018-05-06 RX ADMIN — POLYETHYLENE GLYCOL (3350) 17 G: 17 POWDER, FOR SOLUTION ORAL at 08:45

## 2018-05-06 RX ADMIN — CARBOXYMETHYLCELLULOSE SODIUM 2 DROP: 5 SOLUTION/ DROPS OPHTHALMIC at 08:46

## 2018-05-06 RX ADMIN — LORAZEPAM 1 MG: 1 TABLET ORAL at 21:34

## 2018-05-06 RX ADMIN — LORAZEPAM 0.5 MG: 0.5 TABLET ORAL at 08:48

## 2018-05-06 RX ADMIN — APIXABAN 5 MG: 5 TABLET, FILM COATED ORAL at 08:46

## 2018-05-06 RX ADMIN — AMLODIPINE BESYLATE 2.5 MG: 5 TABLET ORAL at 08:46

## 2018-05-06 NOTE — PLAN OF CARE
Problem: Patient Care Overview  Goal: Plan of Care Review  Outcome: Ongoing (interventions implemented as appropriate)   05/06/18 3536   Coping/Psychosocial   Plan of Care Reviewed With patient   Coping/Psychosocial   Patient Agreement with Plan of Care agrees   Plan of Care Review   Progress improving   OTHER   Outcome Summary PT REMAINS SOMATIC. PT REPORTS BEING ANXIOUS AND DEPRESSED BUT CANNOT RATE NUMERICALLY. DENIES ANY SI, HI, OR AV HALLUCINATIONS. PT WAS ASSISTED TO SHOWER WITH 1 STAFF ASSISTANCE. BLOOD TINGED SPUTUM NOTED TODAY ON PT BEDSHEETS. MD NOTIFIED.        Problem: Overarching Goals (Adult)  Goal: Adheres to Safety Considerations for Self and Others  Outcome: Ongoing (interventions implemented as appropriate)    Goal: Optimized Coping Skills in Response to Life Stressors  Outcome: Ongoing (interventions implemented as appropriate)    Goal: Develops/Participates in Therapeutic [place] to Support Successful Transition  Outcome: Ongoing (interventions implemented as appropriate)

## 2018-05-06 NOTE — PROGRESS NOTES
"INPATIENT PSYCHIATRIC PROGRESS NOTE    Name:  Lacie Win  :  1945  MRN:  [id number]  Visit Number:  [id number]  Length of stay:  6    Behavioral Health Treatment Plan and Problem List: I have reviewed and approved the Behavioral Health Treatment Plan and Problem list.    SUBJECTIVE  CC: \"cant eat\"     INTERVAL HISTORY: The patient's mental status is about the same, but she is re-experiencing hemoptysis. Discussed with Dr. Velasquez and she has recommended to hold Eliquis and she will contact Dr. Hess and let us know tomorrow about his recommendations and to hold ECT until a decision is made tomorrow.  Depression rating \"don't know\" 10  Anxiety rating \"don't know\" 10  Sleep: 6-7 hours    MMSE on : 32  Clock face on 5/3: normal       Review of Systems   Constitutional: Positive for fatigue.   Respiratory: Negative.    Cardiovascular: Negative.    Gastrointestinal: Negative.    Musculoskeletal: Negative.    Neurological: Positive for weakness.         OBJECTIVE    Temp:  [97.6 °F (36.4 °C)-98.3 °F (36.8 °C)] 98.3 °F (36.8 °C)  Heart Rate:  [64-97] 97  Resp:  [18] 18  BP: (112-121)/(57-59) 112/59    MENTAL STATUS EXAM:      Appearance:Casually dressed, good hygeine.   Cooperation:Cooperative  Psychomotor: No psychomotor agitation/retardation, No EPS, No motor tics  Speech-normal rate, amount.   Mood/Affect: anxious, worried  Thought Processes: associations intact  Thought Content: distorted, negative, contents she can not do basic functions such as eating or walking.   Hallucination(s): none  Hopelessness: Yes  Optimistic:No  Suicidal Thoughts:  none  Suicidal Plan/Intent: none  Homicidal Thoughts:  absent  Orientation: oriented x 3  Memory: Immediate, recent, recent remote, remote intact    Lab Results (last 24 hours)     ** No results found for the last 24 hours. **           Imaging Results (last 24 hours)     ** No results found for the last 24 hours. **           ECG/EMG Results (most recent)     " Procedure Component Value Units Date/Time    ECG 12 Lead [[id number]] Collected:  04/30/18 1627     Updated:  04/30/18 1809    Narrative:       Test Reason : Potential adverse reaction to medications.  Blood Pressure : **/** mmHG  Vent. Rate : 083 BPM     Atrial Rate : 083 BPM     P-R Int : 140 ms          QRS Dur : 078 ms      QT Int : 382 ms       P-R-T Axes : 065 054 061 degrees     QTc Int : 448 ms    Normal sinus rhythm with sinus arrhythmia  Normal ECG  When compared with ECG of 13-MAY-2017 11:11,  No significant change was found  Confirmed by Gigi Judd ([id number]) on 4/30/2018 6:09:06 PM    Referred By:  STACIE           Confirmed By:Gigi Judd    Adult Transthoracic Echo Complete W/ Cont if Necessary Per Protocol [[id number]] Collected:  05/03/18 1306     Updated:  05/04/18 1427     BSA 1.5 m^2      IVSd 0.96 cm      IVSs 1.1 cm      LVIDd 3.9 cm      LVIDs 2.6 cm      LVPWd 0.91 cm      BH CV ECHO HERMINIA - LVPWS 1.4 cm      IVS/LVPW 1.0     FS 33.4 %      EDV(Teich) 66.5 ml      ESV(Teich) 24.8 ml      EF(Teich) 62.7 %      EDV(cubed) 60.0 ml      ESV(cubed) 17.7 ml      EF(cubed) 70.4 %      % IVS thick 15.1 %      % LVPW thick 56.4 %      LV mass(C)d 111.7 grams      LV mass(C)dI 73.7 grams/m^2      LV mass(C)s 98.7 grams      LV mass(C)sI 65.1 grams/m^2      SV(Teich) 41.7 ml      SI(Teich) 27.5 ml/m^2      SV(cubed) 42.2 ml      SI(cubed) 27.8 ml/m^2      Ao root diam 2.9 cm      Ao root area 6.4 cm^2      ACS 1.9 cm      LA dimension 2.1 cm      LA/Ao 0.74     LVOT diam 2.0 cm      LVOT area 3.1 cm^2      LVOT area(traced) 3.1 cm^2      LVLd ap4 4.8 cm      EDV(MOD-sp4) 21.0 ml      LVLs ap4 5.1 cm      ESV(MOD-sp4) 9.0 ml      EF(MOD-sp4) 57.1 %      SV(MOD-sp4) 12.0 ml      SI(MOD-sp4) 7.9 ml/m^2      Ao root area (BSA corrected) 1.9     CONTRAST EF 4CH 57.1 ml/m^2      LV Diastolic corrected for BSA 13.8 ml/m^2      LV Systolic corrected for BSA 5.9 ml/m^2      MV E max selina 48.4 cm/sec      MV  A max selina 96.7 cm/sec      MV E/A 0.5     Ao pk selina 156.1 cm/sec      Ao max PG 9.7 mmHg      Ao V2 mean 96.0 cm/sec      Ao mean PG 4.7 mmHg      Ao V2 VTI 27.7 cm      SV(Ao) 178.2 ml      SI(Ao) 117.5 ml/m^2      PA acc slope 1,606 cm/sec^2      PA acc time 0.06 sec      TR max selina 234.8 cm/sec      RVSP(TR) 32.1 mmHg      RAP systole 10.0 mmHg      PA pr(Accel) 50.5 mmHg       CV ECHO HERMINIA - BZI_BMI 27.2 kilograms/m^2       CV ECHO HERMINIA - BSA(HAYCOCK) 1.6 m^2       CV ECHO HERMINIA - BZI_METRIC_WEIGHT 59.0 kg       CV ECHO HERMINIA - BZI_METRIC_HEIGHT 147.3 cm      Target HR (85%) 125 bpm      Max. Pred. HR (100%) 147 bpm     Narrative:         · Estimated EF appears to be in the range of 56 - 60%. Normal left   ventricular cavity size and wall thickness noted. All left ventricular   wall segments contract normally. Left ventricular diastolic dysfunction is   noted (grade I) consistent with impaired relaxation.     No significant valvular heart disease noted.             ALLERGIES: Ciprofloxacin; Lamotrigine; Risperidone and related; Seroquel [quetiapine fumarate]; Sulfa antibiotics; Zoloft [sertraline hcl]; and Penicillins      Current Facility-Administered Medications:   •  acetaminophen (TYLENOL) tablet 487.5 mg, 487.5 mg, Oral, Q6H PRN, Kalpesh Nguyen MD, 487.5 mg at 05/04/18 0831  •  aluminum-magnesium hydroxide-simethicone (MAALOX MAX) 400-400-40 MG/5ML suspension 15 mL, 15 mL, Oral, Q6H PRN, Kalpesh Nguyen MD  •  amLODIPine (NORVASC) tablet 2.5 mg, 2.5 mg, Oral, Daily, Bryan Velasquez MD, 2.5 mg at 05/06/18 0846  •  apixaban (ELIQUIS) tablet 5 mg, 5 mg, Oral, Q12H, Anastasia Garnica PA-C, 5 mg at 05/06/18 0846  •  benzonatate (TESSALON) capsule 100 mg, 100 mg, Oral, TID PRN, Kalpesh Nguyen MD  •  benztropine (COGENTIN) tablet 1 mg, 1 mg, Oral, Daily PRN **OR** benztropine (COGENTIN) injection 0.5 mg, 0.5 mg, Intramuscular, Daily PRN, Kalpesh Nguyen MD  •   carboxymethylcellulose (REFRESH PLUS) 0.5 % ophthalmic solution 2 drop, 2 drop, Both Eyes, Daily, Kalpesh Nguyen MD, 2 drop at 05/06/18 0846  •  hydrOXYzine (ATARAX) tablet 50 mg, 50 mg, Oral, Q6H PRN, Kalpesh Nguyen MD  •  LORazepam (ATIVAN) tablet 0.5 mg, 0.5 mg, Oral, Daily, Kalpesh Nguyen MD, 0.5 mg at 05/06/18 0848  •  LORazepam (ATIVAN) tablet 1 mg, 1 mg, Oral, Nightly, Kalpesh Nguyen MD, 1 mg at 05/05/18 2047  •  magnesium hydroxide (MILK OF MAGNESIA) suspension 2400 mg/10mL 10 mL, 10 mL, Oral, Daily PRN, Kalpesh Nguyen MD  •  polyethylene glycol 3350 powder (packet), 17 g, Oral, Daily, Kalpesh Nguyen MD, 17 g at 05/06/18 0845  •  traZODone (DESYREL) tablet 50 mg, 50 mg, Oral, Nightly PRN, Kalpesh Nguyen MD    ASSESSMENT & PLAN     GERMAN (generalized anxiety disorder) F41.1 Plan: Low-dose lorazepam    •     • Severe episode of recurrent major depressive disorder, 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 .     • HTN (hypertension) I10 Plan: Continue Norvasc plus hospitalist consultation    • Dry eyes H04.123 Plan: False 2 tears    • DVT (deep venous thrombosis) (History of)  I82.409 Plan: Will hold the Eliquis at this time and hold ECT also.           Suicide precautions: Suicide precaution Level 3 (q15 min checks)     Behavioral Health Treatment Plan and Problem List: I have reviewed and approved the Behavioral Health Treatment Plan and Problem list.    Clinician:  Dianna Mccray MD  05/06/18  5:51 PM    Dictated utilizing Dragon dictation

## 2018-05-06 NOTE — PLAN OF CARE
Problem: Patient Care Overview  Goal: Plan of Care Review  Outcome: Ongoing (interventions implemented as appropriate)  Continues to isolate. States she has anxiety and depression, but unable to rate. Did take an h.s. snack when offered. Oriented x3. Stated she wasn't doing what she was supposed to do such as eating and walking more. Anticipates having ECT on Monday. Appeared to sleep well this shift.   05/06/18 0317   Coping/Psychosocial   Plan of Care Reviewed With patient   Coping/Psychosocial   Patient Agreement with Plan of Care agrees   Plan of Care Review   Progress no change        Problem: Overarching Goals (Adult)  Goal: Adheres to Safety Considerations for Self and Others  Outcome: Ongoing (interventions implemented as appropriate)    Goal: Optimized Coping Skills in Response to Life Stressors  Outcome: Ongoing (interventions implemented as appropriate)    Goal: Develops/Participates in Therapeutic [place] to Support Successful Transition  Outcome: Ongoing (interventions implemented as appropriate)

## 2018-05-06 NOTE — PROGRESS NOTES
[facility] HOSPITALISTS CROSS COVER NOTE    BP still controlled with the lower dose of Norvasc that was started yesterday.  Please call the hospitalist service if the BP increases above  160/100.  We will sign off; thank you for the consult.    Bryan Velasquez MD  05/06/18  7:31 AM

## 2018-05-07 PROCEDURE — 99232 SBSQ HOSP IP/OBS MODERATE 35: CPT | Performed by: PSYCHIATRY & NEUROLOGY

## 2018-05-07 RX ADMIN — LORAZEPAM 0.5 MG: 0.5 TABLET ORAL at 09:45

## 2018-05-07 RX ADMIN — POLYETHYLENE GLYCOL (3350) 17 G: 17 POWDER, FOR SOLUTION ORAL at 09:45

## 2018-05-07 RX ADMIN — LORAZEPAM 1 MG: 1 TABLET ORAL at 21:34

## 2018-05-07 RX ADMIN — CARBOXYMETHYLCELLULOSE SODIUM 2 DROP: 5 SOLUTION/ DROPS OPHTHALMIC at 09:45

## 2018-05-07 RX ADMIN — AMLODIPINE BESYLATE 2.5 MG: 5 TABLET ORAL at 09:44

## 2018-05-07 NOTE — PROGRESS NOTES
1340:       DATA:       Covering therapist met individually with patient this date. Reviewed evaluation by Dr. Nguyen. Therapist provided emotional support this date and education that treatment team continues to await medical clearance before ECT can be started.  Patient receptive. Therapist staffed case with DEMI Pizarro who has spoken with daughter/POA.  Patient's POA concerned about patient's length of stay and wanted treatment team to know that her nursing home bed will only be held until 5/14.      ASSESSMENT:     Patient observed to have depressed affect and mood.  Patient's thought content appears negativistic. She reports frustration waiting on ECT work up. Patient receptive to education and emotional support by therapist. Patient noted to be in personal clothing in day room watching tv.  She reports a loss of appetite this date.       Plan:    Patient to remain hospitalized this date. Aftercare scheduled with the [facility].  Patient to return to nursing home placement upon discharge.

## 2018-05-07 NOTE — NURSING NOTE
SPOKE TO GUDELIA WHITTAKER (DAUGHTER). REPORTS HER MOTHER HAS BEEN ON EVERYTHING IN THE WORLD FOR DEPRESSION. EXPLAINED THE PHYSICIAN WOULD LIKE A LIST OF PREVIOUSLY TRIED MEDS, EFFECTS, DOSE, AND DURATION. DAUGHTER SAID SHE WILL PONDER ON IT TODAY AND BRING A LIST AT VISITATION [facility]. ALSO REPORTED SHE RECEIVED A NOTE FROM THE NURSING STATING HER MOTHERS BED AT THE NURSING HOME IS ONLY BEING HELD UNTIL 5/14.

## 2018-05-07 NOTE — PROGRESS NOTES
Nutrition Services    Patient Name:  Lacie Win  YOB: 1945  MRN: [id number]  Admit Date:  4/30/2018    Intake average 33% of meals (25-50%).  Providing supplements tid.  MD may consider evaluation for an appetite stimulant.     Electronically signed by:  Amanda Bill RD  05/07/18 8:54 AM

## 2018-05-07 NOTE — NURSING NOTE
Spoke to the pulmonology office, no pulmonologist on call today. Spoke to  and explained dr patel had followed the pt previously, reported she would contact dr patel with the consult information.

## 2018-05-07 NOTE — PLAN OF CARE
"Problem: Patient Care Overview  Goal: Plan of Care Review  Outcome: Ongoing (interventions implemented as appropriate)  Pt. did say she felt anxious, but couldn't rate, stating: \"i'm just tired.\" When asked about depression patient stated she just wanted to go home and asked how to go about being able to go home. Continues to be withdrawn and feeling hopeless. Refused h.s. s   05/07/18 0459   Coping/Psychosocial   Plan of Care Reviewed With patient   Coping/Psychosocial   Patient Agreement with Plan of Care agrees   Plan of Care Review   Progress no change   nack. Has appeared to sleep well this shift.    Problem: Overarching Goals (Adult)  Goal: Adheres to Safety Considerations for Self and Others  Outcome: Ongoing (interventions implemented as appropriate)    Goal: Optimized Coping Skills in Response to Life Stressors  Outcome: Ongoing (interventions implemented as appropriate)    Goal: Develops/Participates in Therapeutic [place] to Support Successful Transition  Outcome: Ongoing (interventions implemented as appropriate)        "

## 2018-05-07 NOTE — PROGRESS NOTES
"INPATIENT PSYCHIATRIC PROGRESS NOTE    Name:  Lacie Win  :  1945  MRN:  [id number]  Visit Number:  [id number]  Length of stay:  7    Behavioral Health Treatment Plan and Problem List: I have reviewed and approved the Behavioral Health Treatment Plan and Problem list.    SUBJECTIVE  CC: \"Just want to get better\".     INTERVAL HISTORY: Recurrent episode of Hemoptysis yesterday causing hospitalist to redraw the ECT clearance till after patient seen again by the Pulmonologist, Dr. Hess.     Slept well, \"not being able to think, all mixed up, have hope of getting better\". \"Feel sad, I want to be normal\". No psychomotor retardation or latency of response.  \"Can't get past thinking I can't walk or eat\". \"Can't see\". \"Can't sleep\".     Depression rating \"Severely depressed cause I don't want to be like this\"  Anxiety rating \"Serverely worried\"  Did sleep through the night.      Review of Systems   Respiratory: Negative.    Cardiovascular: Negative.    Gastrointestinal: Negative.    Musculoskeletal: Positive for back pain.   Neurological: Negative.       Has been restarted on the Eliquis.   OBJECTIVE    Temp:  [96.9 °F (36.1 °C)-98.3 °F (36.8 °C)] 96.9 °F (36.1 °C)  Heart Rate:  [63-97] 63  Resp:  [18] 18  BP: (112-155)/(59-80) 155/80    MENTAL STATUS EXAM:      Appearance:Casually dressed, good hygeine.   Cooperation:Cooperative  Psychomotor: No psychomotor agitation/retardation, No EPS, No motor tics  Speech-normal rate, amount.   Mood/Affect: Depressed  Thought Processes: associations intact  Thought Content: negativistic   Hallucination(s): none  Hopelessness: No  Optimistic:minimally  Suicidal Thoughts:  none  Suicidal Plan/Intent: none  Homicidal Thoughts:  absent  Orientation: oriented x 3  Memory: recent intact    Lab Results (last 24 hours)     ** No results found for the last 24 hours. **           Imaging Results (last 24 hours)     ** No results found for the last 24 hours. **           ECG/EMG " Results (most recent)     Procedure Component Value Units Date/Time    ECG 12 Lead [[id number]] Collected:  04/30/18 1627     Updated:  04/30/18 1809    Narrative:       Test Reason : Potential adverse reaction to medications.  Blood Pressure : **/** mmHG  Vent. Rate : 083 BPM     Atrial Rate : 083 BPM     P-R Int : 140 ms          QRS Dur : 078 ms      QT Int : 382 ms       P-R-T Axes : 065 054 061 degrees     QTc Int : 448 ms    Normal sinus rhythm with sinus arrhythmia  Normal ECG  When compared with ECG of 13-MAY-2017 11:11,  No significant change was found  Confirmed by Gigi Judd ([id number]) on 4/30/2018 6:09:06 PM    Referred By:  STACIE           Confirmed By:Gigi Judd    Adult Transthoracic Echo Complete W/ Cont if Necessary Per Protocol [[id number]] Collected:  05/03/18 1306     Updated:  05/04/18 1427     BSA 1.5 m^2      IVSd 0.96 cm      IVSs 1.1 cm      LVIDd 3.9 cm      LVIDs 2.6 cm      LVPWd 0.91 cm      BH CV ECHO HERMINIA - LVPWS 1.4 cm      IVS/LVPW 1.0     FS 33.4 %      EDV(Teich) 66.5 ml      ESV(Teich) 24.8 ml      EF(Teich) 62.7 %      EDV(cubed) 60.0 ml      ESV(cubed) 17.7 ml      EF(cubed) 70.4 %      % IVS thick 15.1 %      % LVPW thick 56.4 %      LV mass(C)d 111.7 grams      LV mass(C)dI 73.7 grams/m^2      LV mass(C)s 98.7 grams      LV mass(C)sI 65.1 grams/m^2      SV(Teich) 41.7 ml      SI(Teich) 27.5 ml/m^2      SV(cubed) 42.2 ml      SI(cubed) 27.8 ml/m^2      Ao root diam 2.9 cm      Ao root area 6.4 cm^2      ACS 1.9 cm      LA dimension 2.1 cm      LA/Ao 0.74     LVOT diam 2.0 cm      LVOT area 3.1 cm^2      LVOT area(traced) 3.1 cm^2      LVLd ap4 4.8 cm      EDV(MOD-sp4) 21.0 ml      LVLs ap4 5.1 cm      ESV(MOD-sp4) 9.0 ml      EF(MOD-sp4) 57.1 %      SV(MOD-sp4) 12.0 ml      SI(MOD-sp4) 7.9 ml/m^2      Ao root area (BSA corrected) 1.9     CONTRAST EF 4CH 57.1 ml/m^2      LV Diastolic corrected for BSA 13.8 ml/m^2      LV Systolic corrected for BSA 5.9 ml/m^2      MV E  max selina 48.4 cm/sec      MV A max selina 96.7 cm/sec      MV E/A 0.5     Ao pk selina 156.1 cm/sec      Ao max PG 9.7 mmHg      Ao V2 mean 96.0 cm/sec      Ao mean PG 4.7 mmHg      Ao V2 VTI 27.7 cm      SV(Ao) 178.2 ml      SI(Ao) 117.5 ml/m^2      PA acc slope 1,606 cm/sec^2      PA acc time 0.06 sec      TR max selina 234.8 cm/sec      RVSP(TR) 32.1 mmHg      RAP systole 10.0 mmHg      PA pr(Accel) 50.5 mmHg       CV ECHO HERMINIA - BZI_BMI 27.2 kilograms/m^2       CV ECHO HERMINIA - BSA(HAYCOCK) 1.6 m^2       CV ECHO HERMINIA - BZI_METRIC_WEIGHT 59.0 kg       CV ECHO HERMINIA - BZI_METRIC_HEIGHT 147.3 cm      Target HR (85%) 125 bpm      Max. Pred. HR (100%) 147 bpm     Narrative:         · Estimated EF appears to be in the range of 56 - 60%. Normal left   ventricular cavity size and wall thickness noted. All left ventricular   wall segments contract normally. Left ventricular diastolic dysfunction is   noted (grade I) consistent with impaired relaxation.     No significant valvular heart disease noted.             ALLERGIES: Ciprofloxacin; Lamotrigine; Risperidone and related; Seroquel [quetiapine fumarate]; Sulfa antibiotics; Zoloft [sertraline hcl]; and Penicillins      Current Facility-Administered Medications:   •  acetaminophen (TYLENOL) tablet 487.5 mg, 487.5 mg, Oral, Q6H PRN, Kalpesh Nguyen MD, 487.5 mg at 05/04/18 0831  •  aluminum-magnesium hydroxide-simethicone (MAALOX MAX) 400-400-40 MG/5ML suspension 15 mL, 15 mL, Oral, Q6H PRN, Kalpesh Nguyen MD  •  amLODIPine (NORVASC) tablet 2.5 mg, 2.5 mg, Oral, Daily, Bryan Velasquez MD, 2.5 mg at 05/06/18 0846  •  benzonatate (TESSALON) capsule 100 mg, 100 mg, Oral, TID PRN, Kalpesh Nguyen MD  •  benztropine (COGENTIN) tablet 1 mg, 1 mg, Oral, Daily PRN **OR** benztropine (COGENTIN) injection 0.5 mg, 0.5 mg, Intramuscular, Daily PRN, Kalpesh Nguyen MD  •  carboxymethylcellulose (REFRESH PLUS) 0.5 % ophthalmic solution 2 drop, 2 drop,  Both Eyes, Daily, Kalpesh Nguyen MD, 2 drop at 05/06/18 0846  •  hydrOXYzine (ATARAX) tablet 50 mg, 50 mg, Oral, Q6H PRN, Kalpesh Nguyen MD  •  LORazepam (ATIVAN) tablet 0.5 mg, 0.5 mg, Oral, Daily, Kalpesh Nguyen MD, 0.5 mg at 05/06/18 0848  •  LORazepam (ATIVAN) tablet 1 mg, 1 mg, Oral, Nightly, Kalpesh Nguyen MD, 1 mg at 05/06/18 2134  •  magnesium hydroxide (MILK OF MAGNESIA) suspension 2400 mg/10mL 10 mL, 10 mL, Oral, Daily PRN, Kalpesh Nguyen MD  •  polyethylene glycol 3350 powder (packet), 17 g, Oral, Daily, Kalpesh Nguyen MD, 17 g at 05/06/18 0845  •  traZODone (DESYREL) tablet 50 mg, 50 mg, Oral, Nightly PRN, Kalpesh Nguyen MD    ASSESSMENT & PLAN       GERMAN (generalized anxiety disorder) F41.1 Plan: Low-dose lorazepam    •       • Severe episode of recurrent major depressive disorder, 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 .Have not restarted an antidepressant, has failed multiple trails In the past in part due to side effects. .      • HTN (hypertension) I10 Plan: Continue Norvasc plus hospitalist consultation    • Dry eyes H04.123 Plan: False 2 tears    • DVT (deep venous thrombosis) (History of)  I82.409 Plan: Will hold the Eliquis till Sunday per consultant's recommendation.          Suicide precautions: Suicide precaution Level 3 (q15 min checks)     Behavioral Health Treatment Plan and Problem List: I have reviewed and approved the Behavioral Health Treatment Plan and Problem list.    Clinician:  Kalpesh Nguyen MD  05/07/18  7:39 AM    Dictated utilizing Dragon dictation

## 2018-05-07 NOTE — PLAN OF CARE
Problem: Patient Care Overview  Goal: Plan of Care Review  Outcome: Ongoing (interventions implemented as appropriate)   05/07/18 1536   Coping/Psychosocial   Plan of Care Reviewed With patient   Coping/Psychosocial   Patient Agreement with Plan of Care agrees   Plan of Care Review   Progress no change   OTHER   Outcome Summary Remains somatic. Out in dayroom most of today. Poor appetite. Had a shower.        Problem: Overarching Goals (Adult)  Goal: Adheres to Safety Considerations for Self and Others  Outcome: Ongoing (interventions implemented as appropriate)    Goal: Optimized Coping Skills in Response to Life Stressors  Outcome: Ongoing (interventions implemented as appropriate)    Goal: Develops/Participates in Therapeutic [place] to Support Successful Transition  Outcome: Ongoing (interventions implemented as appropriate)      Problem: Fall Risk (Adult)  Goal: Identify Related Risk Factors and Signs and Symptoms  Outcome: Ongoing (interventions implemented as appropriate)    Goal: Absence of Fall  Outcome: Ongoing (interventions implemented as appropriate)

## 2018-05-08 ENCOUNTER — APPOINTMENT (OUTPATIENT)
Dept: CT IMAGING | Facility: HOSPITAL | Age: 73
End: 2018-05-08

## 2018-05-08 ENCOUNTER — APPOINTMENT (OUTPATIENT)
Dept: GENERAL RADIOLOGY | Facility: HOSPITAL | Age: 73
End: 2018-05-08

## 2018-05-08 PROBLEM — R04.2 HEMOPTYSIS: Status: ACTIVE | Noted: 2018-04-30

## 2018-05-08 LAB
BASOPHILS # BLD AUTO: 0.02 10*3/MM3 (ref 0–0.3)
BASOPHILS NFR BLD AUTO: 0.3 % (ref 0–2)
DEPRECATED RDW RBC AUTO: 46.9 FL (ref 37–54)
EOSINOPHIL # BLD AUTO: 0.06 10*3/MM3 (ref 0–0.7)
EOSINOPHIL NFR BLD AUTO: 1 % (ref 0–7)
ERYTHROCYTE [DISTWIDTH] IN BLOOD BY AUTOMATED COUNT: 14.1 % (ref 11.5–14.5)
HCT VFR BLD AUTO: 40.3 % (ref 37–47)
HGB BLD-MCNC: 13.4 G/DL (ref 12–16)
IMM GRANULOCYTES # BLD: 0.01 10*3/MM3 (ref 0–0.03)
IMM GRANULOCYTES NFR BLD: 0.2 % (ref 0–0.5)
INR PPP: 1.03 (ref 0.9–1.1)
LYMPHOCYTES # BLD AUTO: 2.02 10*3/MM3 (ref 1–3)
LYMPHOCYTES NFR BLD AUTO: 35.1 % (ref 16–46)
MCH RBC QN AUTO: 30.2 PG (ref 27–33)
MCHC RBC AUTO-ENTMCNC: 33.3 G/DL (ref 33–37)
MCV RBC AUTO: 91 FL (ref 80–94)
MONOCYTES # BLD AUTO: 0.81 10*3/MM3 (ref 0.1–0.9)
MONOCYTES NFR BLD AUTO: 14.1 % (ref 0–12)
NEUTROPHILS # BLD AUTO: 2.84 10*3/MM3 (ref 1.4–6.5)
NEUTROPHILS NFR BLD AUTO: 49.3 % (ref 40–75)
PLATELET # BLD AUTO: 298 10*3/MM3 (ref 130–400)
PMV BLD AUTO: 11.9 FL (ref 6–10)
PROTHROMBIN TIME: 13.7 SECONDS (ref 11–15.4)
RBC # BLD AUTO: 4.43 10*6/MM3 (ref 4.2–5.4)
WBC NRBC COR # BLD: 5.76 10*3/MM3 (ref 4.5–12.5)

## 2018-05-08 PROCEDURE — 71250 CT THORAX DX C-: CPT

## 2018-05-08 PROCEDURE — 99231 SBSQ HOSP IP/OBS SF/LOW 25: CPT | Performed by: INTERNAL MEDICINE

## 2018-05-08 PROCEDURE — 71250 CT THORAX DX C-: CPT | Performed by: RADIOLOGY

## 2018-05-08 PROCEDURE — 85610 PROTHROMBIN TIME: CPT | Performed by: INTERNAL MEDICINE

## 2018-05-08 PROCEDURE — 85025 COMPLETE CBC W/AUTO DIFF WBC: CPT | Performed by: INTERNAL MEDICINE

## 2018-05-08 PROCEDURE — 99232 SBSQ HOSP IP/OBS MODERATE 35: CPT | Performed by: PSYCHIATRY & NEUROLOGY

## 2018-05-08 PROCEDURE — 71045 X-RAY EXAM CHEST 1 VIEW: CPT | Performed by: RADIOLOGY

## 2018-05-08 PROCEDURE — 71045 X-RAY EXAM CHEST 1 VIEW: CPT

## 2018-05-08 RX ADMIN — AMLODIPINE BESYLATE 2.5 MG: 5 TABLET ORAL at 08:42

## 2018-05-08 RX ADMIN — CARBOXYMETHYLCELLULOSE SODIUM 2 DROP: 5 SOLUTION/ DROPS OPHTHALMIC at 08:41

## 2018-05-08 RX ADMIN — POLYETHYLENE GLYCOL (3350) 17 G: 17 POWDER, FOR SOLUTION ORAL at 08:42

## 2018-05-08 RX ADMIN — LORAZEPAM 0.5 MG: 0.5 TABLET ORAL at 08:45

## 2018-05-08 NOTE — PROGRESS NOTES
"INPATIENT PSYCHIATRIC PROGRESS NOTE    Name:  Lacie Win  :  1945  MRN:  [id number]  Visit Number:  [id number]  Length of stay:  8    Behavioral Health Treatment Plan and Problem List: I have reviewed and approved the Behavioral Health Treatment Plan and Problem list.    SUBJECTIVE  CC: \"Want the ECT so I can go home\".     INTERVAL HISTORY: Patient's MS has not changed - remains perplexed (not confused) and negative saying she doesn't know why she can't eat, walk, go home, etc. Affect more \"worried\" and obviously depressed. Memory intact for yesterday's events. Hold up on the ECT waiting on medical clearance for general anesthesia. No data on risk of ECT while on Eliquis.   Dr. Hess to see patient later today.   Will have to decide about ECT before Thursday, last day will be possible due to my schedule and the deadline for saving the patient's NH bed.       Depression rating not able to rate.   Anxiety rating not able to rate.   Sleep: through the night.      Review of Systems   Respiratory: Negative.    Cardiovascular: Negative.    Gastrointestinal: Negative.    Musculoskeletal: Negative.    Neurological: Negative.          OBJECTIVE    Temp:  [98.7 °F (37.1 °C)] 98.7 °F (37.1 °C)  Heart Rate:  [66-90] 90  Resp:  [18] 18  BP: (126-131)/(63-81) 131/81    MENTAL STATUS EXAM:      Appearance:Casually dressed, good hygeine.   Cooperation:Cooperative  Psychomotor: No psychomotor agitation/retardation, No EPS, No motor tics  Speech-normal rate, amount.   Mood/Affect: negative  Thought Processes: associations intact  Thought Content: negativistic   Hallucination(s): none  Hopelessness: No  Optimistic:minimally, believes, as does her family, that ECT is the answer.   Suicidal Thoughts:  none  Suicidal Plan/Intent: none  Homicidal Thoughts:  absent  Orientation: oriented x 3  Memory: Immediate intact and recent intact    Lab Results (last 24 hours)     ** No results found for the last 24 hours. **    "        Imaging Results (last 24 hours)     ** No results found for the last 24 hours. **           ECG/EMG Results (most recent)     Procedure Component Value Units Date/Time    ECG 12 Lead [[id number]] Collected:  04/30/18 1627     Updated:  04/30/18 1809    Narrative:       Test Reason : Potential adverse reaction to medications.  Blood Pressure : **/** mmHG  Vent. Rate : 083 BPM     Atrial Rate : 083 BPM     P-R Int : 140 ms          QRS Dur : 078 ms      QT Int : 382 ms       P-R-T Axes : 065 054 061 degrees     QTc Int : 448 ms    Normal sinus rhythm with sinus arrhythmia  Normal ECG  When compared with ECG of 13-MAY-2017 11:11,  No significant change was found  Confirmed by Gigi Judd ([id number]) on 4/30/2018 6:09:06 PM    Referred By:  STACIE           Confirmed By:Gigi Judd    Adult Transthoracic Echo Complete W/ Cont if Necessary Per Protocol [[id number]] Collected:  05/03/18 1306     Updated:  05/04/18 1427     BSA 1.5 m^2      IVSd 0.96 cm      IVSs 1.1 cm      LVIDd 3.9 cm      LVIDs 2.6 cm      LVPWd 0.91 cm      BH CV ECHO HERMINIA - LVPWS 1.4 cm      IVS/LVPW 1.0     FS 33.4 %      EDV(Teich) 66.5 ml      ESV(Teich) 24.8 ml      EF(Teich) 62.7 %      EDV(cubed) 60.0 ml      ESV(cubed) 17.7 ml      EF(cubed) 70.4 %      % IVS thick 15.1 %      % LVPW thick 56.4 %      LV mass(C)d 111.7 grams      LV mass(C)dI 73.7 grams/m^2      LV mass(C)s 98.7 grams      LV mass(C)sI 65.1 grams/m^2      SV(Teich) 41.7 ml      SI(Teich) 27.5 ml/m^2      SV(cubed) 42.2 ml      SI(cubed) 27.8 ml/m^2      Ao root diam 2.9 cm      Ao root area 6.4 cm^2      ACS 1.9 cm      LA dimension 2.1 cm      LA/Ao 0.74     LVOT diam 2.0 cm      LVOT area 3.1 cm^2      LVOT area(traced) 3.1 cm^2      LVLd ap4 4.8 cm      EDV(MOD-sp4) 21.0 ml      LVLs ap4 5.1 cm      ESV(MOD-sp4) 9.0 ml      EF(MOD-sp4) 57.1 %      SV(MOD-sp4) 12.0 ml      SI(MOD-sp4) 7.9 ml/m^2      Ao root area (BSA corrected) 1.9     CONTRAST EF 4CH 57.1  ml/m^2      LV Diastolic corrected for BSA 13.8 ml/m^2      LV Systolic corrected for BSA 5.9 ml/m^2      MV E max selina 48.4 cm/sec      MV A max selina 96.7 cm/sec      MV E/A 0.5     Ao pk selina 156.1 cm/sec      Ao max PG 9.7 mmHg      Ao V2 mean 96.0 cm/sec      Ao mean PG 4.7 mmHg      Ao V2 VTI 27.7 cm      SV(Ao) 178.2 ml      SI(Ao) 117.5 ml/m^2      PA acc slope 1,606 cm/sec^2      PA acc time 0.06 sec      TR max selina 234.8 cm/sec      RVSP(TR) 32.1 mmHg      RAP systole 10.0 mmHg      PA pr(Accel) 50.5 mmHg       CV ECHO HERMINIA - BZI_BMI 27.2 kilograms/m^2       CV ECHO HERMINIA - BSA(HAYCOCK) 1.6 m^2       CV ECHO HERMINIA - BZI_METRIC_WEIGHT 59.0 kg       CV ECHO HERMINIA - BZI_METRIC_HEIGHT 147.3 cm      Target HR (85%) 125 bpm      Max. Pred. HR (100%) 147 bpm     Narrative:         · Estimated EF appears to be in the range of 56 - 60%. Normal left   ventricular cavity size and wall thickness noted. All left ventricular   wall segments contract normally. Left ventricular diastolic dysfunction is   noted (grade I) consistent with impaired relaxation.     No significant valvular heart disease noted.             ALLERGIES: Ciprofloxacin; Lamotrigine; Risperidone and related; Seroquel [quetiapine fumarate]; Sulfa antibiotics; Zoloft [sertraline hcl]; and Penicillins      Current Facility-Administered Medications:   •  acetaminophen (TYLENOL) tablet 487.5 mg, 487.5 mg, Oral, Q6H PRN, Kalpesh Nguyen MD, 487.5 mg at 05/04/18 0831  •  aluminum-magnesium hydroxide-simethicone (MAALOX MAX) 400-400-40 MG/5ML suspension 15 mL, 15 mL, Oral, Q6H PRN, Kalpesh Nguyen MD  •  amLODIPine (NORVASC) tablet 2.5 mg, 2.5 mg, Oral, Daily, Bryan Velasquez MD, 2.5 mg at 05/08/18 0842  •  benzonatate (TESSALON) capsule 100 mg, 100 mg, Oral, TID PRN, Kalpesh Nguyen MD  •  benztropine (COGENTIN) tablet 1 mg, 1 mg, Oral, Daily PRN **OR** benztropine (COGENTIN) injection 0.5 mg, 0.5 mg, Intramuscular, Daily PRN,  Kalpesh Nguyen MD  •  carboxymethylcellulose (REFRESH PLUS) 0.5 % ophthalmic solution 2 drop, 2 drop, Both Eyes, Daily, Kalpesh Nguyen MD, 2 drop at 05/08/18 0841  •  hydrOXYzine (ATARAX) tablet 50 mg, 50 mg, Oral, Q6H PRN, Kalpesh Nguyen MD  •  LORazepam (ATIVAN) tablet 0.5 mg, 0.5 mg, Oral, Daily, Kalpesh Nguyen MD, 0.5 mg at 05/08/18 0845  •  LORazepam (ATIVAN) tablet 1 mg, 1 mg, Oral, Nightly, Kalpesh Nguyen MD, 1 mg at 05/07/18 2134  •  magnesium hydroxide (MILK OF MAGNESIA) suspension 2400 mg/10mL 10 mL, 10 mL, Oral, Daily PRN, Kalpesh Nguyen MD  •  polyethylene glycol 3350 powder (packet), 17 g, Oral, Daily, Kalpesh Nguyen MD, 17 g at 05/08/18 0842  •  traZODone (DESYREL) tablet 50 mg, 50 mg, Oral, Nightly PRN, Kalpesh Nguyen MD    ASSESSMENT & PLAN    Patient Active Problem List   Diagnosis   • GERMAN (generalized anxiety disorder)   • Vascular dementia   • Severe episode of recurrent major depressive disorder, without psychotic features   • HTN (hypertension)   • Dry eyes   • DVT (deep venous thrombosis)   • Myelofibrosis   • Bone marrow transplant status         Suicide precautions: Suicide precaution Level 3 (q15 min checks)     Behavioral Health Treatment Plan and Problem List: I have reviewed and approved the Behavioral Health Treatment Plan and Problem list.    Clinician:  Kalpesh Nguyen MD  05/08/18  8:51 AM    Dictated utilizing Dragon dictation

## 2018-05-08 NOTE — PROGRESS NOTES
1030:    DATA:      Covering therapist continues to assist with patient's case. Staffed with treatment team this morning. No major changes.  Therapist contacted patient's daughter/POA Elysia this morning to provide update.  Elysia reports that she visited the patient on Monday and encouraged her to walk around the unit. She reports that patient was receptive but continues to think that she can't eat or walk. Received verbal consent to contact patient's nursing home to confirm that patient has until 5/14 to return to her nursing home bed.  Therapist has staffed this with navigator Michelle.     ASSESSMENT:     Patient observed to rest in the day room. She socializes when approached,  but continues to be pre-occupied with not being able to eat, walk, get better. Patient noted to have a distressed affect at times.  When therapist provides emotional support, patient's thought content is often negativistic.  For example, therapist praised patient this morning and patient stated that she was not getting better.       Plan:    Patient to remain hospitalized this date.  We are attempting medical clearance for ECT.  Patient's POA continues to be agreeable. Patient will need to return to nursing home placement by 5/14 to ensure her bed is saved. Therapist will continue to assist as needed.

## 2018-05-08 NOTE — PROGRESS NOTES
LOS: 8 days     Chief Complaint:  Pulmonology is following for hemoptysis    Subjective     Interval History: Ms. Win is resting in bed.  After restarting her anticoagulation she started to have hemoptysis again.  She is in no acute distress at this time.    History taken from: patient chart RN    Review of Systems:     Review of Systems - History obtained from chart review and the patient  General ROS: negative for - chills, fatigue or fever  Psychological ROS: negative for - anxiety or depression  ENT ROS: negative for - headaches, visual changes or vocal changes  Allergy and Immunology ROS: negative for - nasal congestion, postnasal drip or seasonal allergies  Endocrine ROS: negative for - polydipsia/polyuria  Respiratory ROS: positive for - hemoptysis  Cardiovascular ROS: no chest pain or dyspnea on exertion  Gastrointestinal ROS: no abdominal pain, change in bowel habits, or black or bloody stools  Musculoskeletal ROS: negative for - joint pain, joint stiffness or joint swelling  Neurological ROS: no TIA or stroke symptoms                    Objective     Vital Signs  Temp:  [97.6 °F (36.4 °C)-98.7 °F (37.1 °C)] 97.6 °F (36.4 °C)  Heart Rate:  [66-90] 90  Resp:  [18] 18  BP: (126-131)/(63-81) 131/81  Body mass index is 27.17 kg/m².  No intake or output data in the 24 hours ending 05/08/18 1356  No intake/output data recorded.    Physical Exam:  GENERAL APPEARANCE: Well developed, well nourished, alert and cooperative, and appears to be in no acute distress.    HEAD: normocephalic.    EYES: PERRL    NECK: Neck supple.     CARDIAC: Normal S1 and S2. No S3, S4 or murmurs. Rhythm is regular. There is no peripheral edema, cyanosis or pallor. Extremities are warm and well perfused. Capillary refill is less than 2 seconds. No carotid bruits.    RESPIRATORY: Clear to auscultation and percussion without rales, rhonchi, wheezing or diminished breath sounds.    GI: Positive bowel sounds. Soft, nondistended, nontender.      MUSCULOSKELTAL: No significant deformity or joint abnormality. No edema. Peripheral pulses intact.     NEUROLOGICAL: Strength and sensation symmetric and intact throughout.     PSYCHIATRIC: The mental examination revealed the patient was oriented to person, place, and time.                 Results Review:                I reviewed the patient's new clinical results.  I reviewed the patient's new imaging results and agree with the interpretation.    Results from last 7 days  Lab Units 05/08/18  0930 05/04/18  0818 05/02/18  0512   WBC 10*3/mm3 5.76 7.30 7.85   HEMOGLOBIN g/dL 13.4 14.5 13.1   PLATELETS 10*3/mm3 298 373 350       Results from last 7 days  Lab Units 05/04/18  0818 05/02/18  0512 05/01/18  1805   SODIUM mmol/L 138 139 136   POTASSIUM mmol/L 3.9 3.9 4.1   CHLORIDE mmol/L 106 106 100   CO2 mmol/L 24.2* 25.4 25.8   BUN mg/dL 22* 16 17   CREATININE mg/dL 1.01 0.84 0.97   CALCIUM mg/dL 9.4 9.3 10.3*   GLUCOSE mg/dL 101 79 117*     Lab Results   Component Value Date    INR 1.03 05/08/2018    INR 1.09 05/01/2018    INR 1.99 01/23/2015    PROTIME 13.7 05/08/2018    PROTIME 14.2 05/01/2018    PROTIME 22.3 (H) 01/23/2015       Results from last 7 days  Lab Units 05/02/18  0512   ALK PHOS U/L 75   BILIRUBIN mg/dL 0.7   ALT (SGPT) U/L 10   AST (SGOT) U/L 19         Imaging Results (last 24 hours)     ** No results found for the last 24 hours. **             Medication Review:   Scheduled Medications:    amLODIPine 2.5 mg Oral Daily   carboxymethylcellulose 2 drop Both Eyes Daily   LORazepam 0.5 mg Oral Daily   LORazepam 1 mg Oral Nightly   polyethylene glycol 17 g Oral Daily     Continuous infusions:       Assessment/Plan      Hemoptysis: Eliquis is restarted and patient started to have hemoptysis again.  Discontinued Eliquis.      Ordered a CT scan of the chest- To look for the cause of hemoptysis.    .  We'll schedule her for bronchoscopy on Thursday.  Also ordered a chest x-ray, PT/INR.  CBC is stable.   We'll continue to monitor closely.    From pulmonary standpoint of view patient can have electroconvulsive therapy as patient did not have any hemoptysis from last 2 days.    This was discussed with patient's nurse and Dr. Velasquez.  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 Hess, by SHERLEY Castillo    I, Yariel Hess M.D. attest that the above note accurately reflects the work and decisions made  by me.  Patient was seen and evaluated by Dr. Hess, including history of present illness, physical exam, assessment, and treatment plan.  The above note was reviewed and edited by Dr. Hess.

## 2018-05-08 NOTE — PLAN OF CARE
Problem: Patient Care Overview  Goal: Plan of Care Review  Outcome: Ongoing (interventions implemented as appropriate)   05/08/18 0058   Coping/Psychosocial   Plan of Care Reviewed With patient   Coping/Psychosocial   Patient Agreement with Plan of Care agrees   Plan of Care Review   Progress no change   OTHER   Outcome Summary PT REMAINS SOMATIC, REPORTS HAVING ANXIETY & DEPRESSION BUT CAN'T RATE ON THE SCALE, QUIET UNEVENTFUL NIGHT.       Problem: Overarching Goals (Adult)  Goal: Adheres to Safety Considerations for Self and Others  Outcome: Ongoing (interventions implemented as appropriate)    Goal: Optimized Coping Skills in Response to Life Stressors  Outcome: Ongoing (interventions implemented as appropriate)    Goal: Develops/Participates in Therapeutic [place] to Support Successful Transition  Outcome: Ongoing (interventions implemented as appropriate)      Problem: Fall Risk (Adult)  Goal: Identify Related Risk Factors and Signs and Symptoms  Outcome: Ongoing (interventions implemented as appropriate)    Goal: Absence of Fall  Outcome: Ongoing (interventions implemented as appropriate)

## 2018-05-08 NOTE — PROGRESS NOTES
[facility]    Notified by Dr. Nguyen that the patient is having hemoptysis again with restart of the Eliquis.  I called   Dr. Hess and he stated to stop the Eliquis and that she was still medically low risk for sedation for the ECT clearance.  Dr. Nguyen notified by Dr. Velasquez of this conversation.    Bryan Velasquez MD  05/08/18  12:58 PM

## 2018-05-08 NOTE — PROGRESS NOTES
Phone contact with Dr. Velasquez who has cleared patient for ECT after talking Dr. Hess who advised to hold the patient's Eliquis for ECT.

## 2018-05-09 PROCEDURE — 99232 SBSQ HOSP IP/OBS MODERATE 35: CPT | Performed by: PSYCHIATRY & NEUROLOGY

## 2018-05-09 RX ORDER — NORTRIPTYLINE HYDROCHLORIDE 25 MG/1
25 CAPSULE ORAL NIGHTLY
Status: DISCONTINUED | OUTPATIENT
Start: 2018-05-09 | End: 2018-05-10 | Stop reason: HOSPADM

## 2018-05-09 RX ADMIN — NORTRIPTYLINE HYDROCHLORIDE 25 MG: 25 CAPSULE ORAL at 21:32

## 2018-05-09 RX ADMIN — LORAZEPAM 1 MG: 1 TABLET ORAL at 21:32

## 2018-05-09 NOTE — PROGRESS NOTES
"INPATIENT PSYCHIATRIC PROGRESS NOTE    Name:  Lacie Win  :  1945  MRN:  [id number]  Visit Number:  [id number]  Length of stay:  9    Behavioral Health Treatment Plan and Problem List: I have reviewed and approved the Behavioral Health Treatment Plan and Problem list.    SUBJECTIVE  CC: I'm too sleepy\"    INTERVAL HISTORY: multiple attempts by several staff members including this MD unable to convince the patient to sign the ECT consent  thus canceled. Will start a antidepressant, one she has not been on, and anticipate the patient returning to the nursing home May 14. Is scheduled for bronchoscopy tomorrow.   Patient remain depressed, indecisive, perplexed at times. Slept well. Negative continuing to say that she can't see, eat, walk, etc. No suicidal intent.       Review of Systems   All other systems reviewed and are negative.        OBJECTIVE    Temp:  [97.6 °F (36.4 °C)-98.6 °F (37 °C)] 98.6 °F (37 °C)  Heart Rate:  [60-90] 60  Resp:  [18] 18  BP: (122-131)/(64-81) 122/64    MENTAL STATUS EXAM:      Appearance:Casually dressed, good hygeine.   Cooperation:Cooperative  Psychomotor: No psychomotor agitation/retardation, No EPS, No motor tics  Speech-normal rate, amount.   Mood/Affect: Depressed  Thought Processes: associations intact  Thought Content: negativistic   Hallucination(s): none  Hopelessness: No  Optimistic:Yes and minimally  Suicidal Thoughts:  none  Suicidal Plan/Intent: none  Homicidal Thoughts:  absent  Orientation: oriented x 3  Memory: recent intact    Lab Results (last 24 hours)     Procedure Component Value Units Date/Time    Protime-INR [[id number]]  (Normal) Collected:  18 0930    Specimen:  Blood Updated:  18 1014     Protime 13.7 Seconds      Comment: Note new Reference Range        INR 1.03    Narrative:       Suggested INR therapeutic range for stable oral anticoagulant therapy:    Low Intensity therapy:   1.5-2.0  Moderate Intensity therapy:   2.0-3.0  High " Intensity therapy:   2.5-4.0    CBC & Differential [[id number]] Collected:  05/08/18 0930    Specimen:  Blood Updated:  05/08/18 1006    Narrative:       The following orders were created for panel order CBC & Differential.  Procedure                               Abnormality         Status                     ---------                               -----------         ------                     CBC Auto Differential[[id number]]        Abnormal            Final result                 Please view results for these tests on the individual orders.    CBC Auto Differential [[id number]]  (Abnormal) Collected:  05/08/18 0930    Specimen:  Blood Updated:  05/08/18 1006     WBC 5.76 10*3/mm3      RBC 4.43 10*6/mm3      Hemoglobin 13.4 g/dL      Hematocrit 40.3 %      MCV 91.0 fL      MCH 30.2 pg      MCHC 33.3 g/dL      RDW 14.1 %      RDW-SD 46.9 fl      MPV 11.9 (H) fL      Platelets 298 10*3/mm3      Neutrophil % 49.3 %      Lymphocyte % 35.1 %      Monocyte % 14.1 (H) %      Eosinophil % 1.0 %      Basophil % 0.3 %      Immature Grans % 0.2 %      Neutrophils, Absolute 2.84 10*3/mm3      Lymphocytes, Absolute 2.02 10*3/mm3      Monocytes, Absolute 0.81 10*3/mm3      Eosinophils, Absolute 0.06 10*3/mm3      Basophils, Absolute 0.02 10*3/mm3      Immature Grans, Absolute 0.01 10*3/mm3            Imaging Results (last 24 hours)     Procedure Component Value Units Date/Time    CT Chest Without Contrast [[id number]] Collected:  05/08/18 1442     Updated:  05/08/18 1559    Narrative:       CT CHEST WITHOUT CONTRAST-      CLINICAL INDICATION: Hemoptysis, massive, hemodynamically stable;  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          DOSE:      COMPARISON: Chest CT dated 06/06/2013.     Radiation dose reduction techniques were utilized per ALARA protocol.  Automated exposure control was initiated through either [employer] or  [employer] software packages by   protocol.           PROCEDURE: Axial images were acquired from the thoracic inlet through  the upper abdomen without any IV contrast. Reformatted images were  created.     FINDINGS: Today's study shows coronary calcifications.     No mediastinal or hilar lymph node enlargement is present.     There are no new parenchymal soft tissue nodules or masses.     No pericardial or pleural effusions.     A 3 mm nodule along the fissure in the right lung on image 42 of the  axial series is stable.       Impression:       No definitive source for the patient's hemoptysis.           This report was finalized on 5/8/2018 3:57 PM by Dr. Eddy De León MD.       XR Chest 1 View [[id number]] Collected:  05/08/18 1538     Updated:  05/08/18 1548    Narrative:       XR CHEST 1 VW-     CLINICAL INDICATION: hemoptysis; F33.2-Major depressive disorder,  recurrent severe without psychotic features; R04.2-Hemoptysis          COMPARISON: 04/30/2018      TECHNIQUE: Single frontal view of the chest.     FINDINGS:     There is no focal alveolar infiltrate or effusion.  The cardiac silhouette is normal. The pulmonary vasculature is  unremarkable.  There is no evidence of an acute osseous abnormality.   There are no suspicious-appearing parenchymal soft tissue nodules.            Impression:       No evidence of active or acute cardiopulmonary disease on today's chest  radiograph.         This report was finalized on 5/8/2018 3:38 PM by Dr. Eddy De León MD.              ECG/EMG Results (most recent)     Procedure Component Value Units Date/Time    ECG 12 Lead [[id number]] Collected:  04/30/18 1627     Updated:  04/30/18 1809    Narrative:       Test Reason : Potential adverse reaction to medications.  Blood Pressure : **/** mmHG  Vent. Rate : 083 BPM     Atrial Rate : 083 BPM     P-R Int : 140 ms          QRS Dur : 078 ms      QT Int : 382 ms       P-R-T Axes : 065 054 061 degrees     QTc Int : 448 ms    Normal sinus rhythm with sinus  arrhythmia  Normal ECG  When compared with ECG of 13-MAY-2017 11:11,  No significant change was found  Confirmed by Gigi Judd ([id number]) on 4/30/2018 6:09:06 PM    Referred By:  STACIE           Confirmed By:Gigi Judd    Adult Transthoracic Echo Complete W/ Cont if Necessary Per Protocol [[id number]] Collected:  05/03/18 1306     Updated:  05/04/18 1427     BSA 1.5 m^2      IVSd 0.96 cm      IVSs 1.1 cm      LVIDd 3.9 cm      LVIDs 2.6 cm      LVPWd 0.91 cm      BH CV ECHO HERMINIA - LVPWS 1.4 cm      IVS/LVPW 1.0     FS 33.4 %      EDV(Teich) 66.5 ml      ESV(Teich) 24.8 ml      EF(Teich) 62.7 %      EDV(cubed) 60.0 ml      ESV(cubed) 17.7 ml      EF(cubed) 70.4 %      % IVS thick 15.1 %      % LVPW thick 56.4 %      LV mass(C)d 111.7 grams      LV mass(C)dI 73.7 grams/m^2      LV mass(C)s 98.7 grams      LV mass(C)sI 65.1 grams/m^2      SV(Teich) 41.7 ml      SI(Teich) 27.5 ml/m^2      SV(cubed) 42.2 ml      SI(cubed) 27.8 ml/m^2      Ao root diam 2.9 cm      Ao root area 6.4 cm^2      ACS 1.9 cm      LA dimension 2.1 cm      LA/Ao 0.74     LVOT diam 2.0 cm      LVOT area 3.1 cm^2      LVOT area(traced) 3.1 cm^2      LVLd ap4 4.8 cm      EDV(MOD-sp4) 21.0 ml      LVLs ap4 5.1 cm      ESV(MOD-sp4) 9.0 ml      EF(MOD-sp4) 57.1 %      SV(MOD-sp4) 12.0 ml      SI(MOD-sp4) 7.9 ml/m^2      Ao root area (BSA corrected) 1.9     CONTRAST EF 4CH 57.1 ml/m^2      LV Diastolic corrected for BSA 13.8 ml/m^2      LV Systolic corrected for BSA 5.9 ml/m^2      MV E max selina 48.4 cm/sec      MV A max selina 96.7 cm/sec      MV E/A 0.5     Ao pk selina 156.1 cm/sec      Ao max PG 9.7 mmHg      Ao V2 mean 96.0 cm/sec      Ao mean PG 4.7 mmHg      Ao V2 VTI 27.7 cm      SV(Ao) 178.2 ml      SI(Ao) 117.5 ml/m^2      PA acc slope 1,606 cm/sec^2      PA acc time 0.06 sec      TR max selina 234.8 cm/sec      RVSP(TR) 32.1 mmHg      RAP systole 10.0 mmHg      PA pr(Accel) 50.5 mmHg      BH CV ECHO HERMINIA - BZI_BMI 27.2 kilograms/m^2      BH CV  ECHO HERMINIA - BSA(HAYCOCK) 1.6 m^2      BH CV ECHO HERMINIA - BZI_METRIC_WEIGHT 59.0 kg      BH CV ECHO HERMINIA - BZI_METRIC_HEIGHT 147.3 cm      Target HR (85%) 125 bpm      Max. Pred. HR (100%) 147 bpm     Narrative:         · Estimated EF appears to be in the range of 56 - 60%. Normal left   ventricular cavity size and wall thickness noted. All left ventricular   wall segments contract normally. Left ventricular diastolic dysfunction is   noted (grade I) consistent with impaired relaxation.     No significant valvular heart disease noted.             ALLERGIES: Ciprofloxacin; Lamotrigine; Risperidone and related; Seroquel [quetiapine fumarate]; Sulfa antibiotics; Zoloft [sertraline hcl]; and Penicillins      Current Facility-Administered Medications:   •  acetaminophen (TYLENOL) tablet 487.5 mg, 487.5 mg, Oral, Q6H PRN, Kalpesh Nguyen MD, 487.5 mg at 05/04/18 0831  •  aluminum-magnesium hydroxide-simethicone (MAALOX MAX) 400-400-40 MG/5ML suspension 15 mL, 15 mL, Oral, Q6H PRN, Kalpesh Nguyen MD  •  amLODIPine (NORVASC) tablet 2.5 mg, 2.5 mg, Oral, Daily, Bryan Velasquez MD, 2.5 mg at 05/08/18 0842  •  benzonatate (TESSALON) capsule 100 mg, 100 mg, Oral, TID PRN, Kalpesh Nguyen MD  •  benztropine (COGENTIN) tablet 1 mg, 1 mg, Oral, Daily PRN **OR** benztropine (COGENTIN) injection 0.5 mg, 0.5 mg, Intramuscular, Daily PRN, Kalpesh Nguyen MD  •  carboxymethylcellulose (REFRESH PLUS) 0.5 % ophthalmic solution 2 drop, 2 drop, Both Eyes, Daily, Kalpesh Nguyen MD, 2 drop at 05/08/18 0841  •  hydrOXYzine (ATARAX) tablet 50 mg, 50 mg, Oral, Q6H PRN, Kalpesh Nguyen MD  •  LORazepam (ATIVAN) tablet 0.5 mg, 0.5 mg, Oral, Daily, Kalpesh Nguyen MD, 0.5 mg at 05/08/18 0893  •  LORazepam (ATIVAN) tablet 1 mg, 1 mg, Oral, Nightly, Kalpesh Nguyen MD, 1 mg at 05/07/18 8161  •  magnesium hydroxide (MILK OF MAGNESIA) suspension 2400 mg/10mL 10 mL, 10 mL, Oral,  Daily PRN, Kalpesh Nguyen MD  •  polyethylene glycol 3350 powder (packet), 17 g, Oral, Daily, Kalpesh Nugyen MD, 17 g at 05/08/18 0842  •  traZODone (DESYREL) tablet 50 mg, 50 mg, Oral, Nightly PRN, Kalpesh Nguyen MD    ASSESSMENT & PLAN    Patient Active Problem List   Diagnosis   • GERMAN (generalized anxiety disorder)   • Vascular dementia   • Severe episode of recurrent major depressive disorder, without psychotic features   • HTN (hypertension)   • Dry eyes   • DVT (deep venous thrombosis)   • Myelofibrosis   • Bone marrow transplant status   • Hemoptysis         Suicide precautions: Suicide precaution Level 3 (q15 min checks)     Behavioral Health Treatment Plan and Problem List: I have reviewed and approved the Behavioral Health Treatment Plan and Problem list.    Clinician:  Kalpesh Nguyen MD  05/09/18  7:38 AM    Dictated utilizing Dragon dictation

## 2018-05-09 NOTE — PLAN OF CARE
"Problem: Patient Care Overview  Goal: Plan of Care Review  Outcome: Ongoing (interventions implemented as appropriate)   05/09/18 0137   Coping/Psychosocial   Plan of Care Reviewed With patient   Coping/Psychosocial   Patient Agreement with Plan of Care agrees   Plan of Care Review   Progress no change   OTHER   Outcome Summary PT UNABLE TO RATE ANXIETY & DEPRESSION ON SCALE, DENIED SI/HI/SHAH, WITHDRAWN THIS SHIFT, REFUSED TO SIGN THE CONSENT FOR ECT STATING \"I CAN'T\" EXPLAINED TO PT THAT IF SHE WANTED THE PROCEDURE DONE SHE WOULD HAVE TO SIGN THE CONSENT & SHE SAID SHE COULDN'T.        Problem: Overarching Goals (Adult)  Goal: Adheres to Safety Considerations for Self and Others  Outcome: Ongoing (interventions implemented as appropriate)    Goal: Optimized Coping Skills in Response to Life Stressors  Outcome: Ongoing (interventions implemented as appropriate)    Goal: Develops/Participates in Therapeutic [place] to Support Successful Transition  Outcome: Ongoing (interventions implemented as appropriate)      Problem: Fall Risk (Adult)  Goal: Identify Related Risk Factors and Signs and Symptoms  Outcome: Ongoing (interventions implemented as appropriate)    Goal: Absence of Fall  Outcome: Ongoing (interventions implemented as appropriate)        "

## 2018-05-09 NOTE — PROGRESS NOTES
"1225:      DATA:      Therapist met individually with patient this date to check on needs. Patient seen at bedside. Patient has poor eye contact and is minimally verbal. Therapist encouraged patient to attempt to eat lunch and dinner today. Patient reported, \"I can't! I can't even breath!\"  Patient remains perplexed and staff continue to provide daily emotional support and reassurance.  Patient unwilling to sign consent for ECT.  Staffed case with treatment team and it appears that Dr. Nguyen plans to treatment with anti-depressant this date. Therapist/navigator have updated Dr. Nguyen that patient will need to return to nursing home on or before 5/14.       ASSESSMENT:     Patient observed to be more isolative to patient room this date and sleeping.  Patient's thought content continues to be negativistic. Patient appears withdrawn today and minimally receptive to emotional support and encouragement by therapist.  Patient remains perplexed and times and believing that she can not eat and walk.     Plan:    Patient to remain hospitalized this date. Aftercare scheduled with the [facility] and patient to return to [facility].  POA Elysia to be updated as needed. Anticipate family to visit patient tonight.   "

## 2018-05-10 VITALS
TEMPERATURE: 97.8 F | HEART RATE: 109 BPM | SYSTOLIC BLOOD PRESSURE: 127 MMHG | WEIGHT: 130 LBS | RESPIRATION RATE: 18 BRPM | BODY MASS INDEX: 27.29 KG/M2 | DIASTOLIC BLOOD PRESSURE: 84 MMHG | HEIGHT: 58 IN | OXYGEN SATURATION: 97 %

## 2018-05-10 PROCEDURE — 99239 HOSP IP/OBS DSCHRG MGMT >30: CPT | Performed by: PSYCHIATRY & NEUROLOGY

## 2018-05-10 RX ORDER — NORTRIPTYLINE HYDROCHLORIDE 25 MG/1
25 CAPSULE ORAL NIGHTLY
Qty: 30 CAPSULE | Refills: 0 | Status: SHIPPED | OUTPATIENT
Start: 2018-05-10 | End: 2018-11-20 | Stop reason: SDUPTHER

## 2018-05-10 RX ADMIN — LORAZEPAM 0.5 MG: 0.5 TABLET ORAL at 08:42

## 2018-05-10 RX ADMIN — AMLODIPINE BESYLATE 2.5 MG: 5 TABLET ORAL at 08:42

## 2018-05-10 RX ADMIN — POLYETHYLENE GLYCOL (3350) 17 G: 17 POWDER, FOR SOLUTION ORAL at 08:41

## 2018-05-10 RX ADMIN — CARBOXYMETHYLCELLULOSE SODIUM 2 DROP: 5 SOLUTION/ DROPS OPHTHALMIC at 08:42

## 2018-05-10 NOTE — PROGRESS NOTES
"1000  Therapist contacted Patient's POA and daughter Elysia Fallon this date to inform her of the discharge. Elysia stated,\"that's good. That's what I wanted\". Therapist went on to discuss the benefits of Patient returning the nursing home due to the availability of more family contact. Elysia acknowledged. Elysia went on to report dissatisfaction with treatment stating, \"if it was your mother you would be upset too; but I'm not mad at you\". Elysia discussed that she was upset when she was told that the Patient has not been eating or walking due to Patient refusing to do so. Elysia reports that the nursing staff should have \"made\" her walk due to \"that's what she's here for\". Therapist attempted to address however; Elysia continued stating, \"I have just never seen anything like it. If I would have known I wouldn't have brought her\". Elysia changed topic of conversation immediately without giving Therapist an opportunity to respond by inquiring about time of transport.     Elysia reports she will be here to transport Patient around noon today.   "

## 2018-05-10 NOTE — PROGRESS NOTES
"1015  DATA:  Therapist made Lead RN Ana aware of Elysia's reports.     Therapist met with Patient today in the dayroom lobby. Patient engaged Therapist inquiring about her daughter's location. Therapist explained that a discharge order take time and to expect her daughter around noon today. Patient acknowledged. Patient than discussed \"wishing to think\" about ECT. Therapist encouraged Patient to seek treatment again if ECT is desired.Sourav acknowledged but continued to ask about the whereabouts of her daughter.     1137  Therapist contacted [facility] and spoke to Jennifer. Jennifer was made aware of Patient's discharge. Jennifer acknowledged and permitted.     ASSESSMENT:  Patient appears well-groomed today. Patient appeared fixated on her daughter's arrival and reports readiness for discharge.     PLAN:  Patient will return to [facility] for disposition and follow-up with the [facility] for outpatient services.     Patient's daughter Elysia will be providing transportation today. ETA: Noon.     "

## 2018-05-10 NOTE — DISCHARGE SUMMARY
Date of Discharge:  5/10/2018    Discharge Diagnosis:Principal Problem:    Severe episode of recurrent major depressive disorder, without psychotic features  Active Problems:    GERMAN (generalized anxiety disorder)    HTN (hypertension)    Dry eyes    DVT (deep venous thrombosis)    Myelofibrosis    Bone marrow transplant status    Hemoptysis        Presenting Problem/History of Present Illness: Patient was admitted May 1 from the outpatient clinic where she been followed for a number of months being depressed, negativistic at times expressing feelings that she be better off dead.  Admitted specifically to to facilitate evaluation for possible ECT, see history of present illness for further details.      Hospital Course:  Patient was admitted for safety and stabilization and was placed on standard precautions.  Routine labs were checked.  Patient was assigned a masters level therapist and provided with an opportunity to participate in group and individual therapy on the unit.  Patient seen on a daily basis for evaluation and supportive therapy.  Patient seen by medical consultants  For medical clearance for ECT, subsequently also evaluated by Dr. Hess, pulmonologist, recent episodes of hematemesis.  See their notes for details.  Patient subsequently cleared for ECT but then refused to sign the consent May 9, the day treatment was scheduled. .  Patient's antidepressant medication had been discontinued during the initial days of her hospital stay while she was continued on her other medications except for several days without the Eliquis following the episode of hematemesis.  Patient will periodically claims she was incapable of seeing, eating, walking while she was actively performing those basic functions.  This in part due to patient's negativity to avoid decisions, in part relating to anxiety but overall wasn't clear that  it was due to anything other than depression.  She was denying any thoughts of harming  self or others, clear of any overt psychotic thought content at discharge. Mental status exams including a clock face drawing and the Mini-Mental Status exam did not indicate a progressive dementia process.  The day following patient's refusal for ECT her daughter Mary Ellen Fallon contacted this physician and requested the patient be discharged back to the nursing home in part to avoid the 14 day limit issue.  Patient was started on nortriptyline at discharge and will be given an appointment to follow-up in the [facility].  Consults:   Consults     Date and Time Order Name Status Description    5/7/2018 1346 Inpatient Pulmonology Consult      5/1/2018 2205 Inpatient Pulmonology Consult Completed     5/1/2018 1544 Inpatient Hospitalist Consult Completed           Labs:  Lab Results (all)     Procedure Component Value Units Date/Time    Protime-INR [[id number]]  (Normal) Collected:  05/08/18 0930    Specimen:  Blood Updated:  05/08/18 1014     Protime 13.7 Seconds      Comment: Note new Reference Range        INR 1.03    Narrative:       Suggested INR therapeutic range for stable oral anticoagulant therapy:    Low Intensity therapy:   1.5-2.0  Moderate Intensity therapy:   2.0-3.0  High Intensity therapy:   2.5-4.0    CBC & Differential [[id number]] Collected:  05/08/18 0930    Specimen:  Blood Updated:  05/08/18 1006    Narrative:       The following orders were created for panel order CBC & Differential.  Procedure                               Abnormality         Status                     ---------                               -----------         ------                     CBC Auto Differential[[id number]]        Abnormal            Final result                 Please view results for these tests on the individual orders.    CBC Auto Differential [[id number]]  (Abnormal) Collected:  05/08/18 0930    Specimen:  Blood Updated:  05/08/18 1006     WBC 5.76 10*3/mm3      RBC 4.43 10*6/mm3      Hemoglobin 13.4 g/dL       Hematocrit 40.3 %      MCV 91.0 fL      MCH 30.2 pg      MCHC 33.3 g/dL      RDW 14.1 %      RDW-SD 46.9 fl      MPV 11.9 (H) fL      Platelets 298 10*3/mm3      Neutrophil % 49.3 %      Lymphocyte % 35.1 %      Monocyte % 14.1 (H) %      Eosinophil % 1.0 %      Basophil % 0.3 %      Immature Grans % 0.2 %      Neutrophils, Absolute 2.84 10*3/mm3      Lymphocytes, Absolute 2.02 10*3/mm3      Monocytes, Absolute 0.81 10*3/mm3      Eosinophils, Absolute 0.06 10*3/mm3      Basophils, Absolute 0.02 10*3/mm3      Immature Grans, Absolute 0.01 10*3/mm3     Basic Metabolic Panel [[id number]]  (Abnormal) Collected:  05/04/18 0818    Specimen:  Blood Updated:  05/04/18 0913     Glucose 101 mg/dL      BUN 22 (H) mg/dL      Creatinine 1.01 mg/dL      Sodium 138 mmol/L      Potassium 3.9 mmol/L      Chloride 106 mmol/L      CO2 24.2 (L) mmol/L      Calcium 9.4 mg/dL      eGFR Non African Amer 54 (L) mL/min/1.73      BUN/Creatinine Ratio 21.8     Anion Gap 7.8 mmol/L     Narrative:       The MDRD GFR formula is only valid for adults with stable renal function between ages 18 and 70.    Osmolality, Calculated [[id number]]  (Normal) Collected:  05/04/18 0818    Specimen:  Blood Updated:  05/04/18 0913     Osmolality Calc 279.1 mOsm/kg     CBC & Differential [[id number]] Collected:  05/04/18 0818    Specimen:  Blood Updated:  05/04/18 0853    Narrative:       The following orders were created for panel order CBC & Differential.  Procedure                               Abnormality         Status                     ---------                               -----------         ------                     CBC Auto Differential[[id number]]        Abnormal            Final result                 Please view results for these tests on the individual orders.    CBC Auto Differential [[id number]]  (Abnormal) Collected:  05/04/18 0818    Specimen:  Blood Updated:  05/04/18 0853     WBC 7.30 10*3/mm3      RBC 4.70 10*6/mm3      Hemoglobin 14.5 g/dL       Hematocrit 42.5 %      MCV 90.4 fL      MCH 30.9 pg      MCHC 34.1 g/dL      RDW 14.3 %      RDW-SD 46.3 fl      MPV 11.2 (H) fL      Platelets 373 10*3/mm3      Neutrophil % 40.7 %      Lymphocyte % 46.4 (H) %      Monocyte % 11.4 %      Eosinophil % 1.0 %      Basophil % 0.4 %      Immature Grans % 0.1 %      Neutrophils, Absolute 2.97 10*3/mm3      Lymphocytes, Absolute 3.39 (H) 10*3/mm3      Monocytes, Absolute 0.83 10*3/mm3      Eosinophils, Absolute 0.07 10*3/mm3      Basophils, Absolute 0.03 10*3/mm3      Immature Grans, Absolute 0.01 10*3/mm3     Comprehensive Metabolic Panel [[id number]]  (Abnormal) Collected:  05/02/18 0512    Specimen:  Blood Updated:  05/02/18 0619     Glucose 79 mg/dL      BUN 16 mg/dL      Creatinine 0.84 mg/dL      Sodium 139 mmol/L      Potassium 3.9 mmol/L      Chloride 106 mmol/L      CO2 25.4 mmol/L      Calcium 9.3 mg/dL      Total Protein 6.5 g/dL      Albumin 3.80 g/dL      ALT (SGPT) 10 U/L      AST (SGOT) 19 U/L      Alkaline Phosphatase 75 U/L      Comment: Note New Reference Ranges        Total Bilirubin 0.7 mg/dL      eGFR Non African Amer 66 mL/min/1.73      Globulin 2.7 gm/dL      A/G Ratio 1.4 (L) g/dL      BUN/Creatinine Ratio 19.0     Anion Gap 7.6 mmol/L     Narrative:       The MDRD GFR formula is only valid for adults with stable renal function between ages 18 and 70.    Osmolality, Calculated [[id number]]  (Normal) Collected:  05/02/18 0512    Specimen:  Blood Updated:  05/02/18 0619     Osmolality Calc 277.6 mOsm/kg     CBC & Differential [[id number]] Collected:  05/02/18 0512    Specimen:  Blood Updated:  05/02/18 0555    Narrative:       The following orders were created for panel order CBC & Differential.  Procedure                               Abnormality         Status                     ---------                               -----------         ------                     CBC Auto Differential[[id number]]        Abnormal            Final result                  Please view results for these tests on the individual orders.    CBC Auto Differential [[id number]]  (Abnormal) Collected:  05/02/18 0512    Specimen:  Blood Updated:  05/02/18 0555     WBC 7.85 10*3/mm3      RBC 4.30 10*6/mm3      Hemoglobin 13.1 g/dL      Hematocrit 39.1 %      MCV 90.9 fL      MCH 30.5 pg      MCHC 33.5 g/dL      RDW 14.5 %      RDW-SD 46.9 fl      MPV 11.3 (H) fL      Platelets 350 10*3/mm3      Neutrophil % 40.7 %      Lymphocyte % 45.1 %      Monocyte % 13.2 (H) %      Eosinophil % 0.5 %      Basophil % 0.4 %      Immature Grans % 0.1 %      Neutrophils, Absolute 3.19 10*3/mm3      Lymphocytes, Absolute 3.54 (H) 10*3/mm3      Monocytes, Absolute 1.04 (H) 10*3/mm3      Eosinophils, Absolute 0.04 10*3/mm3      Basophils, Absolute 0.03 10*3/mm3      Immature Grans, Absolute 0.01 10*3/mm3     Basic Metabolic Panel [[id number]]  (Abnormal) Collected:  05/01/18 1805    Specimen:  Blood Updated:  05/01/18 1855     Glucose 117 (H) mg/dL      BUN 17 mg/dL      Creatinine 0.97 mg/dL      Sodium 136 mmol/L      Potassium 4.1 mmol/L      Chloride 100 mmol/L      CO2 25.8 mmol/L      Calcium 10.3 (H) mg/dL      eGFR Non African Amer 56 (L) mL/min/1.73      BUN/Creatinine Ratio 17.5     Anion Gap 10.2 mmol/L     Narrative:       The MDRD GFR formula is only valid for adults with stable renal function between ages 18 and 70.    Osmolality, Calculated [[id number]]  (Normal) Collected:  05/01/18 1805    Specimen:  Blood Updated:  05/01/18 1855     Osmolality Calc 274.5 mOsm/kg     Protime-INR [[id number]]  (Normal) Collected:  05/01/18 1805    Specimen:  Blood Updated:  05/01/18 1823     Protime 14.2 Seconds      Comment: Note new Reference Range        INR 1.09    Narrative:       Suggested INR therapeutic range for stable oral anticoagulant therapy:    Low Intensity therapy:   1.5-2.0  Moderate Intensity therapy:   2.0-3.0  High Intensity therapy:   2.5-4.0    CBC & Differential [[id number]] Collected:   05/01/18 1805    Specimen:  Blood Updated:  05/01/18 1814    Narrative:       The following orders were created for panel order CBC & Differential.  Procedure                               Abnormality         Status                     ---------                               -----------         ------                     CBC Auto Differential[[id number]]        Abnormal            Final result                 Please view results for these tests on the individual orders.    CBC Auto Differential [[id number]]  (Abnormal) Collected:  05/01/18 1805    Specimen:  Blood Updated:  05/01/18 1814     WBC 8.48 10*3/mm3      RBC 4.83 10*6/mm3      Hemoglobin 14.8 g/dL      Hematocrit 43.7 %      MCV 90.5 fL      MCH 30.6 pg      MCHC 33.9 g/dL      RDW 13.9 %      RDW-SD 45.8 fl      MPV 10.8 (H) fL      Platelets 378 10*3/mm3      Neutrophil % 77.5 (H) %      Lymphocyte % 16.4 %      Monocyte % 5.8 %      Eosinophil % 0.0 %      Basophil % 0.2 %      Immature Grans % 0.1 %      Neutrophils, Absolute 6.57 (H) 10*3/mm3      Lymphocytes, Absolute 1.39 10*3/mm3      Monocytes, Absolute 0.49 10*3/mm3      Eosinophils, Absolute 0.00 10*3/mm3      Basophils, Absolute 0.02 10*3/mm3      Immature Grans, Absolute 0.01 10*3/mm3     Urine Drug Screen - [[id number]]  (Normal) Collected:  04/30/18 2213    Specimen:  Urine Updated:  04/30/18 2236     Amphetamine Screen, Urine Negative     Barbiturates Screen, Urine Negative     Benzodiazepine Screen, Urine Negative     Cocaine Screen, Urine Negative     Methadone Screen, Urine Negative     Opiate Screen Negative     Phencyclidine (PCP), Urine Negative     THC, Screen, Urine Negative     6-ACETYL MORPHINE Negative     Buprenorphine, Screen, Urine Negative     Oxycodone Screen, Urine Negative    Narrative:       Negative Thresholds For Drugs Screened:                  Amphetamines              1000 ng/ml               Barbiturates               200 ng/ml               Benzodiazepines             200 ng/ml              Cocaine                    300 ng/ml              Methadone                  300 ng/ml              Opiates                    300 ng/ml               Phencyclidine               25 ng/ml               THC                         50 ng/ml              6-Acetyl Morphine           10 ng/ml              Buprenorphine                5 ng/ml              Oxycodone                  300 ng/ml    The reference range for all drugs tested is negative. This report includes final unconfirmed qualitative results to be used for medical treatment purposes only. Unconfirmed results must not be used for non-medical purposes such as employment or legal testing. Clinical consideration should be applied to any drug of abuse test, especially when unconfirmed quantitative results are used.      Urinalysis With / Microscopic If Indicated - [[id number]]  (Abnormal) Collected:  04/30/18 2213    Specimen:  Urine Updated:  04/30/18 2223     Color, UA Yellow     Appearance, UA Clear     pH, UA 6.5     Specific Gravity, UA 1.014     Glucose, UA Negative     Ketones, UA Negative     Bilirubin, UA Negative     Blood, UA Negative     Protein, UA Negative     Leuk Esterase, UA Small (1+) (A)     Nitrite, UA Negative     Urobilinogen, UA 0.2 E.U./dL    Urinalysis, Microscopic Only - Urine, Clean Catch [[id number]]  (Abnormal) Collected:  04/30/18 2213    Specimen:  Urine from Urine, Clean Catch Updated:  04/30/18 2223     RBC, UA 0-2 /HPF      WBC, UA 3-6 (A) /HPF      Bacteria, UA None Seen /HPF      Squamous Epithelial Cells, UA None Seen /HPF      Hyaline Casts, UA None Seen /LPF      Methodology Automated Microscopy    TSH [[id number]]  (Normal) Collected:  04/30/18 1530    Specimen:  Blood Updated:  04/30/18 1620     TSH 2.656 mIU/mL     T4, Free [[id number]]  (Normal) Collected:  04/30/18 1530    Specimen:  Blood Updated:  04/30/18 1620     Free T4 1.18 ng/dL     Comprehensive Metabolic Panel [[id number]]  (Abnormal)  Collected:  04/30/18 1530    Specimen:  Blood Updated:  04/30/18 1613     Glucose 91 mg/dL      BUN 16 mg/dL      Creatinine 0.99 mg/dL      Sodium 139 mmol/L      Potassium 3.9 mmol/L      Chloride 104 mmol/L      CO2 27.7 mmol/L      Calcium 9.6 mg/dL      Total Protein 7.6 g/dL      Albumin 4.50 g/dL      ALT (SGPT) 14 U/L      AST (SGOT) 21 U/L      Alkaline Phosphatase 90 U/L      Comment: Note New Reference Ranges        Total Bilirubin 0.4 mg/dL      eGFR Non African Amer 55 (L) mL/min/1.73      Globulin 3.1 gm/dL      A/G Ratio 1.5 g/dL      BUN/Creatinine Ratio 16.2     Anion Gap 7.3 mmol/L     Narrative:       The MDRD GFR formula is only valid for adults with stable renal function between ages 18 and 70.    Osmolality, Calculated [[id number]]  (Normal) Collected:  04/30/18 1530    Specimen:  Blood Updated:  04/30/18 1613     Osmolality Calc 278.3 mOsm/kg     CBC & Differential [[id number]] Collected:  04/30/18 1530    Specimen:  Blood Updated:  04/30/18 1548    Narrative:       The following orders were created for panel order CBC & Differential.  Procedure                               Abnormality         Status                     ---------                               -----------         ------                     CBC Auto Differential[[id number]]        Abnormal            Final result                 Please view results for these tests on the individual orders.    CBC Auto Differential [[id number]]  (Abnormal) Collected:  04/30/18 1530    Specimen:  Blood Updated:  04/30/18 1548     WBC 7.73 10*3/mm3      RBC 4.77 10*6/mm3      Hemoglobin 14.7 g/dL      Hematocrit 43.8 %      MCV 91.8 fL      MCH 30.8 pg      MCHC 33.6 g/dL      RDW 14.9 (H) %      RDW-SD 48.0 fl      MPV 10.9 (H) fL      Platelets 377 10*3/mm3      Neutrophil % 57.7 %      Lymphocyte % 31.3 %      Monocyte % 10.1 %      Eosinophil % 0.4 %      Basophil % 0.4 %      Immature Grans % 0.1 %      Neutrophils, Absolute 4.46 10*3/mm3       Lymphocytes, Absolute 2.42 10*3/mm3      Monocytes, Absolute 0.78 10*3/mm3      Eosinophils, Absolute 0.03 10*3/mm3      Basophils, Absolute 0.03 10*3/mm3      Immature Grans, Absolute 0.01 10*3/mm3           Imaging:  Imaging Results (all)     Procedure Component Value Units Date/Time    CT Chest Without Contrast [[id number]] Collected:  05/08/18 1442     Updated:  05/08/18 1559    Narrative:       CT CHEST WITHOUT CONTRAST-      CLINICAL INDICATION: Hemoptysis, massive, hemodynamically stable;  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          DOSE:      COMPARISON: Chest CT dated 06/06/2013.     Radiation dose reduction techniques were utilized per ALARA protocol.  Automated exposure control was initiated through either or [employer] or  [employer] software packages by  protocol.           PROCEDURE: Axial images were acquired from the thoracic inlet through  the upper abdomen without any IV contrast. Reformatted images were  created.     FINDINGS: Today's study shows coronary calcifications.     No mediastinal or hilar lymph node enlargement is present.     There are no new parenchymal soft tissue nodules or masses.     No pericardial or pleural effusions.     A 3 mm nodule along the fissure in the right lung on image 42 of the  axial series is stable.       Impression:       No definitive source for the patient's hemoptysis.           This report was finalized on 5/8/2018 3:57 PM by Dr. Eddy De León MD.       XR Chest 1 View [[id number]] Collected:  05/08/18 1538     Updated:  05/08/18 1548    Narrative:       XR CHEST 1 VW-     CLINICAL INDICATION: hemoptysis; F33.2-Major depressive disorder,  recurrent severe without psychotic features; R04.2-Hemoptysis          COMPARISON: 04/30/2018      TECHNIQUE: Single frontal view of the chest.     FINDINGS:     There is no focal alveolar infiltrate or effusion.  The cardiac silhouette is normal. The  pulmonary vasculature is  unremarkable.  There is no evidence of an acute osseous abnormality.   There are no suspicious-appearing parenchymal soft tissue nodules.            Impression:       No evidence of active or acute cardiopulmonary disease on today's chest  radiograph.         This report was finalized on 5/8/2018 3:38 PM by Dr. Eddy De León MD.       US Venous Doppler Lower Extremity Bilateral (duplex) [[id number]] Collected:  05/02/18 0740     Updated:  05/02/18 0743    Narrative:       EXAMINATION: US VENOUS DOPPLER LOWER EXTREMITY BILATERAL (DUPLEX)-      CLINICAL INDICATION:     Leg Pain and Swelling  Left >right lower extremity edema with history of clots      TECHNIQUE: Multiplanar gray scale and Doppler vascular sonographic  imaging of the deep veins  without and with compression.      COMPARISON: NONE      FINDINGS: The visualized deep veins demonstrate flow and are  compressible. No evidence of deep venous thrombosis.             Impression:       No DVT.     This report was finalized on 5/2/2018 7:40 AM by Dr. Arik Durbin MD.       XR Spine Lumbar Lateral [[id number]] Collected:  05/01/18 0827     Updated:  05/01/18 0829    Narrative:       EXAMINATION: XR SPINE LUMBAR LATERAL-      Technique:5 views of lumbar spine.     CLINICAL INDICATION:    Back pain.     COMPARISON:    None.     FINDINGS:    Vertebral body height and alignment are maintained. There  is mild multilevel degenerative change. No compression deformity.       Impression:       No radiographic evidence of acute compression fracture of  the lumbar spine.         This report was finalized on 5/1/2018 8:27 AM by Dr. Arik Durbin MD.       XR Chest PA & Lateral [[id number]] Collected:  04/30/18 2132     Updated:  04/30/18 2134    Narrative:       EXAMINATION: XR CHEST PA AND LATERAL-      CLINICAL INDICATION: ELECTROCONVULSIVE THERAPY          COMPARISON: 05/19/2017      TECHNIQUE: XR CHEST PA AND LATERAL-      FINDINGS:   Lungs are  adequately aerated.   Heart and mediastinal contours are unremarkable.   No pneumothorax.   Bony and soft tissue structures are unremarkable.            Impression:       No radiographic evidence of acute cardiac or pulmonary disease.     This report was finalized on 4/30/2018 9:32 PM by Dr. Eddy De León MD.                     Condition on Discharge:  stable    Prognosis: guarded    Vital Signs  Temp:  [96.9 °F (36.1 °C)-97.8 °F (36.6 °C)] 97.8 °F (36.6 °C)  Heart Rate:  [] 109  Resp:  [18] 18  BP: (127-160)/(70-84) 127/84    Discharge Disposition  Skilled Nursing Facility (DC - External)    Discharge Medications   Lacie Win   Home Medication Instructions AROLDO:[id number]    Printed on:05/10/18 0824   Medication Information                      amLODIPine (NORVASC) 5 MG tablet  Take 5 mg by mouth Daily.             apixaban (ELIQUIS) 5 MG tablet tablet  Take 5 mg by mouth Every 12 (Twelve) Hours.             ipratropium-albuterol (DUO-NEB) 0.5-2.5 mg/mL nebulizer  Take 3 mL by nebulization Every 6 (Six) Hours As Needed for Wheezing.             loratadine (CLARITIN) 10 MG tablet  Take 10 mg by mouth Daily.             LORazepam (ATIVAN) 0.5 MG tablet  Take 0.5 mg by mouth 2 (Two) Times a Day. Prior to [facility] Admission, Patient was on: takes at 0800 and 1400             LORazepam (ATIVAN) 1 MG tablet  Take 1 tablet by mouth Every Night.             nortriptyline (PAMELOR) 25 MG capsule  Take 1 capsule by mouth Every Night.             Polyethyl Glycol-Propyl Glycol (SYSTANE) 0.4-0.3 % gel  Administer 2 drops to both eyes 2 (Two) Times a Day.             polyethylene glycol (MIRALAX) packet  Take 17 g by mouth Daily.                 Discharge Diet: regular    Activity at Discharge: no restrictions    Follow-up Appointments:    Future Appointments  Date Time Provider Department Center   5/14/2018 11:30 AM Kalpesh Nguyen MD [facility] None         Kalpesh Nguyen MD  05/10/18  8:24  AM  Time spent with the discharge process >30 minutes.     Dictated utilizing Dragon dictation

## 2018-05-10 NOTE — NURSING NOTE
Called [facility] home  spoke with nurse Maria gave her discharge summary report on medications Maria verbalizes  medications will be filled by their pharmacy  for pt. To follow up with DR. Hess in his office for any hemoptysis .

## 2018-05-10 NOTE — PLAN OF CARE
Problem: Patient Care Overview  Goal: Plan of Care Review  Outcome: Ongoing (interventions implemented as appropriate)   05/10/18 0506   Coping/Psychosocial   Plan of Care Reviewed With patient   Coping/Psychosocial   Patient Agreement with Plan of Care agrees   Plan of Care Review   Progress no change       Problem: Overarching Goals (Adult)  Goal: Adheres to Safety Considerations for Self and Others  Outcome: Ongoing (interventions implemented as appropriate)    Goal: Optimized Coping Skills in Response to Life Stressors  Outcome: Ongoing (interventions implemented as appropriate)    Goal: Develops/Participates in Therapeutic [place] to Support Successful Transition  Outcome: Ongoing (interventions implemented as appropriate)

## 2018-05-10 NOTE — NURSING NOTE
Pt. Daughter Elysai was called at [phone] she will come for pt. At 12:00 to take her to the [facility] home discussed medications,instructed to follow up with  pulmonologist for hemoptysis discussed pt. Ate 75 % of breakfast is pleasant cooperative .

## 2018-05-10 NOTE — DISCHARGE INSTRUCTIONS
Instructed pt. & pt. Daughter Elysia to follow up with DR. Hess pulmonologist in his office for any hemoptysis

## 2018-06-25 ENCOUNTER — TELEPHONE (OUTPATIENT)
Dept: PSYCHIATRY | Facility: CLINIC | Age: 73
End: 2018-06-25

## 2018-06-25 NOTE — TELEPHONE ENCOUNTER
Mary Ellen Sarabia requesting To speak with you about her Mother [phone] is a good contact number

## 2018-06-26 ENCOUNTER — TELEPHONE (OUTPATIENT)
Dept: PSYCHIATRY | Facility: CLINIC | Age: 73
End: 2018-06-26

## 2018-06-26 NOTE — TELEPHONE ENCOUNTER
Returned daughter's call - Patient not eating, paranoid, wanting to reschedule having missed their appointment mid May. Will reschedule.

## 2018-07-03 ENCOUNTER — TELEPHONE (OUTPATIENT)
Dept: PSYCHIATRY | Facility: CLINIC | Age: 73
End: 2018-07-03

## 2018-07-03 NOTE — TELEPHONE ENCOUNTER
Daughter Called Wondering If you could see her Mother on a Monday Evening or Friday please Advise

## 2018-07-05 NOTE — TELEPHONE ENCOUNTER
Called and made daughter aware she is  actually out of town this week and was requesting another day .

## 2018-07-11 NOTE — TELEPHONE ENCOUNTER
Left Voicemail for jojo to call me back will make patient an appointment when provider returns back to work

## 2018-09-17 ENCOUNTER — OFFICE VISIT (OUTPATIENT)
Dept: PSYCHIATRY | Facility: CLINIC | Age: 73
End: 2018-09-17

## 2018-09-17 VITALS
HEART RATE: 100 BPM | SYSTOLIC BLOOD PRESSURE: 135 MMHG | HEIGHT: 58 IN | DIASTOLIC BLOOD PRESSURE: 89 MMHG | WEIGHT: 123 LBS | BODY MASS INDEX: 25.82 KG/M2

## 2018-09-17 DIAGNOSIS — F41.1 GAD (GENERALIZED ANXIETY DISORDER): ICD-10-CM

## 2018-09-17 DIAGNOSIS — F33.2 SEVERE EPISODE OF RECURRENT MAJOR DEPRESSIVE DISORDER, WITHOUT PSYCHOTIC FEATURES (HCC): Primary | ICD-10-CM

## 2018-09-17 PROCEDURE — 99214 OFFICE O/P EST MOD 30 MIN: CPT | Performed by: NURSE PRACTITIONER

## 2018-09-17 RX ORDER — ESCITALOPRAM OXALATE 5 MG/1
5 TABLET ORAL DAILY
Qty: 30 TABLET | Refills: 0 | Status: SHIPPED | OUTPATIENT
Start: 2018-09-17 | End: 2018-09-17 | Stop reason: SDUPTHER

## 2018-09-17 NOTE — PROGRESS NOTES
"      Meghan Win is a 73 y.o. female is here today for medication management follow-up.    Chief Complaint:      History of Present Illness:      The following portions of the patient's history were reviewed and updated as appropriate: allergies, current medications, past family history, past medical history, past social history, past surgical history and problem list.    Review of Systems    Objective   Physical Exam  Blood pressure 135/89, pulse 100, height 147.3 cm (57.99\"), weight 55.8 kg (123 lb).    Medication List:   Current Outpatient Prescriptions   Medication Sig Dispense Refill   • amLODIPine (NORVASC) 5 MG tablet Take 5 mg by mouth Daily.     • apixaban (ELIQUIS) 5 MG tablet tablet Take 5 mg by mouth Every 12 (Twelve) Hours.     • loratadine (CLARITIN) 10 MG tablet Take 10 mg by mouth Daily.     • LORazepam (ATIVAN) 0.5 MG tablet Take 0.5 mg by mouth 2 (Two) Times a Day. Prior to [facility] Admission, Patient was on: takes at 0800 and 1400     • LORazepam (ATIVAN) 1 MG tablet Take 1 tablet by mouth Every Night. 30 tablet 0   • nortriptyline (PAMELOR) 25 MG capsule Take 1 capsule by mouth Every Night. 30 capsule 0   • escitalopram (LEXAPRO) 5 MG tablet Take 1 tablet by mouth Daily. 30 tablet 0     No current facility-administered medications for this visit.        Mental Status Exam:   Hygiene:   good  Cooperation:  Cooperative  Eye Contact:  Poor  Psychomotor Behavior:  Restless  Affect:  Restricted  Hopelessness: Denies  Speech:  Normal  Thought Process:  {Thought Process:814010792}  Thought Content:  {Thought Content:972270749}  Suicidal:  None  Homicidal:  None  Hallucinations:  None  Delusion:  None  Memory:  Intact  Orientation:  {Orientation:193275557}  Reliability:  fair  Insight:  Poor  Judgement:  Poor  Impulse Control:  Fair  Physical/Medical Issues:  Yes hypertension    Assessment/Plan   Problems Addressed this Visit     GERMAN (generalized anxiety disorder)    Relevant Medications "    escitalopram (LEXAPRO) 5 MG tablet    Severe episode of recurrent major depressive disorder, without psychotic features (CMS/HCC) - Primary    Relevant Medications    escitalopram (LEXAPRO) 5 MG tablet        Functionality: pt having significant impairment in important areas of daily functioning.  Prognosis: Guarded dependent on medication/follow up and treatment plan compliance.      Discussed medication options. Will begin low dose lexapro.   This was discussed at length with her sister and other family members including her POA.  Reviewed the risks, benefits, and side effects of the medications; They were encouraged to read the package insert for side effects.    Family is agreeable to call the [facility] for any worsening of symptoms.  The office staff is going to discuss with Dr. Nguyen to see his thoughts on ECT therapy as an outpatient and notify the POA.  For now I will have her back in 3 weeks to reassess.

## 2018-09-18 RX ORDER — ESCITALOPRAM OXALATE 5 MG/1
5 TABLET ORAL DAILY
Qty: 30 TABLET | Refills: 0 | Status: SHIPPED | OUTPATIENT
Start: 2018-09-18 | End: 2018-10-09 | Stop reason: SDUPTHER

## 2018-09-18 NOTE — PROGRESS NOTES
"      Subjective   Lacie Win is a 73 y.o. female is here today for medication management follow-up.    Chief Complaint:  anxiety  History of Present Illness:  Patient presents at the [facility] with a niece, her power of , and her sister.  She is a resident of the nursing home.  They state that she continues to decline.  At one point she was hospitalized for an ECT workup and  her medications were stopped she had some  issues that the ECT procedure did not get completed as patient then started to doubt the procedure.  Upon review of her records she has been placed on antipsychotics in the past which causes a worsening tremor.  She is continually  stating the following \"I can't walk, I can't get my clothes on\" patient did walk into the office today with assistance.  She is consistently during the interview stating \"the nursing home won't take me back\" and also stating I don't want to go back there\".  She is totally aware of her surroundings and the family does not feel like she has any dementia as she does not seem to have memory problems.  She denies suicidal thoughts but states \"sometimes I'd rather go on and I feel panicked all the time\".Body mass index is 25.71 kg/m².  She exhibits a weight loss of 7 pounds since last office visit in April.  Family states that she could actually go home and that she does own her own home however it is this panic and anxiety that keeps her from being able to function versus her memory. She does not exhibit any OCD behaviors.  She does not exhibit any ludin symptoms.  She is sleeping at least 6 hours a night. She is not having any anger or aggressive outbursts. She has had MRI of head in past which shows some vascular changes per family.     The following portions of the patient's history were reviewed and updated as appropriate: allergies, current medications, past family history, past medical history, past social history, past surgical history and problem " "list.    Review of Systems   Constitutional: Negative for activity change, appetite change and fatigue.   HENT: Negative.    Eyes: Negative for visual disturbance.   Respiratory: Negative.    Cardiovascular: Negative.    Gastrointestinal: Negative for nausea.   Endocrine: Negative.    Genitourinary: Negative.    Musculoskeletal: Positive for gait problem. Negative for arthralgias.        Unsteady yet can walk with assistance   Skin: Negative.    Allergic/Immunologic: Negative.    Neurological: Negative for dizziness, seizures and headaches.   Hematological: Negative.    Psychiatric/Behavioral: Positive for behavioral problems. Negative for agitation, confusion, decreased concentration, dysphoric mood, hallucinations, self-injury, sleep disturbance and suicidal ideas. The patient is nervous/anxious. The patient is not hyperactive.        Objective   Physical Exam   Constitutional: She is oriented to person, place, and time. She appears well-developed and well-nourished.   HENT:   Head: Normocephalic.   Neurological: She is alert and oriented to person, place, and time.   Psychiatric: Her speech is normal. Her mood appears anxious. Cognition and memory are normal.   Pt kept repeating that the \"nursing home would not let her come back\" and that she cannot walk   Vitals reviewed.    Blood pressure 135/89, pulse 100, height 147.3 cm (57.99\"), weight 55.8 kg (123 lb).    Medication List:   Current Outpatient Prescriptions   Medication Sig Dispense Refill   • amLODIPine (NORVASC) 5 MG tablet Take 5 mg by mouth Daily.     • apixaban (ELIQUIS) 5 MG tablet tablet Take 5 mg by mouth Every 12 (Twelve) Hours.     • loratadine (CLARITIN) 10 MG tablet Take 10 mg by mouth Daily.     • LORazepam (ATIVAN) 0.5 MG tablet Take 0.5 mg by mouth 2 (Two) Times a Day. Prior to [facility] Admission, Patient was on: takes at 0800 and 1400     • LORazepam (ATIVAN) 1 MG tablet Take 1 tablet by mouth Every Night. 30 tablet 0   • nortriptyline " (PAMELOR) 25 MG capsule Take 1 capsule by mouth Every Night. 30 capsule 0   • escitalopram (LEXAPRO) 5 MG tablet Take 1 tablet by mouth Daily. 30 tablet 0     No current facility-administered medications for this visit.        Mental Status Exam:   Hygiene:   good  Cooperation:  Cooperative  Eye Contact:  Fair  Psychomotor Behavior:  Restless  Affect:  Full range  Hopelessness: Denies  Speech:  Normal  Thought Process:  Pressured  Thought Content:  panic  Suicidal:  None  Homicidal:  None  Hallucinations:  None  Delusion:  Unable to demonstrate  Memory:  Intact  Orientation:  Person, Place, Time and Situation  Reliability:  fair  Insight:  Poor  Judgement:  Fair  Impulse Control:  Good  Physical/Medical Issuesprevious blood clotsYes previous blood clots    Assessment/Plan   Problems Addressed this Visit     GERMAN (generalized anxiety disorder)    Relevant Medications    escitalopram (LEXAPRO) 5 MG tablet    Severe episode of recurrent major depressive disorder, without psychotic features (CMS/HCC) - Primary    Relevant Medications    escitalopram (LEXAPRO) 5 MG tablet        Functionality: pt having significant impairment in important areas of daily functioning.  Prognosis: Guarded dependent on medication/follow up and treatment plan compliance.      Discussed medication options.  I had a very lengthy discussion with family members present.  They are aware that beginning medication is a trial and error process and her symptoms could get worse.  I'm going to place her on low-dose Lexapro in hopes of helping with her anxiety and her continual panic state.  She is to continue with the Ativan as scheduled as well as the nortriptyline at bedtime.  The office staff is going to notify Dr. Nguyen and let him reviewed the patient's chart to see if she is a candidate for ECT therapy as an outpatient for now I will have her back to reassess in 3 weeks.   Reviewed the risks, benefits, and side effects of the medications; patient  and family acknowledged and verbally consented.

## 2018-10-09 ENCOUNTER — OFFICE VISIT (OUTPATIENT)
Dept: PSYCHIATRY | Facility: CLINIC | Age: 73
End: 2018-10-09

## 2018-10-09 VITALS
BODY MASS INDEX: 25.48 KG/M2 | HEIGHT: 58 IN | DIASTOLIC BLOOD PRESSURE: 72 MMHG | WEIGHT: 121.4 LBS | HEART RATE: 93 BPM | SYSTOLIC BLOOD PRESSURE: 103 MMHG

## 2018-10-09 DIAGNOSIS — F33.2 SEVERE EPISODE OF RECURRENT MAJOR DEPRESSIVE DISORDER, WITHOUT PSYCHOTIC FEATURES (HCC): Primary | ICD-10-CM

## 2018-10-09 DIAGNOSIS — F41.1 GAD (GENERALIZED ANXIETY DISORDER): ICD-10-CM

## 2018-10-09 DIAGNOSIS — Z79.899 MEDICATION MANAGEMENT: ICD-10-CM

## 2018-10-09 PROCEDURE — 99214 OFFICE O/P EST MOD 30 MIN: CPT | Performed by: NURSE PRACTITIONER

## 2018-10-09 RX ORDER — ESCITALOPRAM OXALATE 10 MG/1
10 TABLET ORAL DAILY
Qty: 30 TABLET | Refills: 0 | Status: SHIPPED | OUTPATIENT
Start: 2018-10-09 | End: 2018-10-30 | Stop reason: SDUPTHER

## 2018-10-09 NOTE — PROGRESS NOTES
Subjective   Lacie Win is a 73 y.o. female is here today for medication management follow-up.    Chief Complaint:  Anxiety and depression recheck  History of Present Illness: Presents with her 2 sisters and her daughter who is POA.  [place] patient is still having some anxiety however she has made improvement.  Her appetite is still decreased Body mass index is 25.38 kg/m².  But they wonder if this is more circumstantial as if they sit there and introduce spades to her she will eat more.  They also state at times I wonder if she is getting her tray at the nursing home as patient has stated sometimes she does not and they have actually witnessed this.  I encouraged them to speak with the director of nursing at the facility regarding this issue.  Patient is still sleeping several hours and not without difficulty.  She still has periods of worry and anxiety however does not seem to be obsessing as bad.  Her gait is more steady and she does not seem as shaky as before.  She has not had any negative side effects to the medication.  She is still receiving Ativan 0.5 mg during the day and 1 mg in the evening per the physician at the nursing home.          The following portions of the patient's history were reviewed and updated as appropriate: allergies, current medications, past family history, past medical history, past social history, past surgical history and problem list.    Review of Systems   Constitutional: Negative for activity change, appetite change and fatigue.   HENT: Negative.    Eyes: Negative for visual disturbance.   Respiratory: Negative.    Cardiovascular: Negative.    Gastrointestinal: Negative for nausea.   Endocrine: Negative.    Genitourinary: Negative.    Musculoskeletal: Negative for arthralgias and gait problem.   Skin: Negative.    Allergic/Immunologic: Negative.    Neurological: Negative for dizziness, seizures and headaches.   Hematological: Negative.    Psychiatric/Behavioral:  "Negative for agitation, behavioral problems, confusion, decreased concentration, dysphoric mood, hallucinations, self-injury, sleep disturbance and suicidal ideas. The patient is nervous/anxious. The patient is not hyperactive.        Objective   Physical Exam   Constitutional: She is oriented to person, place, and time. She appears well-developed and well-nourished.   Pt is more calm on this visit.  Sitting in chair and engaging in conversation   HENT:   Head: Normocephalic.   Neurological: She is alert and oriented to person, place, and time.   Psychiatric: Her speech is normal and behavior is normal. Thought content normal. Her mood appears anxious. Cognition and memory are normal.   Very engaged in conversation and very good with details on her medication etc.    Vitals reviewed.    Blood pressure 103/72, pulse 93, height 147.3 cm (57.99\"), weight 55.1 kg (121 lb 6.4 oz).    Medication List:   Current Outpatient Prescriptions   Medication Sig Dispense Refill   • amLODIPine (NORVASC) 5 MG tablet Take 5 mg by mouth Daily.     • apixaban (ELIQUIS) 5 MG tablet tablet Take 5 mg by mouth Every 12 (Twelve) Hours.     • escitalopram (LEXAPRO) 10 MG tablet Take 1 tablet by mouth Daily. 30 tablet 0   • loratadine (CLARITIN) 10 MG tablet Take 10 mg by mouth Daily.     • LORazepam (ATIVAN) 0.5 MG tablet Take 0.5 mg by mouth 2 (Two) Times a Day. Prior to [facility] Admission, Patient was on: takes at 0800 and 1400     • LORazepam (ATIVAN) 1 MG tablet Take 1 tablet by mouth Every Night. 30 tablet 0   • nortriptyline (PAMELOR) 25 MG capsule Take 1 capsule by mouth Every Night. 30 capsule 0     No current facility-administered medications for this visit.        Mental Status Exam:   Hygiene:   good  Cooperation:  Cooperative  Eye Contact:  Fair  Psychomotor Behavior:  Restless  Affect:  Full range  Hopelessness: Denies  Speech:  Normal  Thought Process:  Pressured  Thought Content:  panic  Suicidal:  None  Homicidal:  " None  Hallucinations:  None  Delusion:  Unable to demonstrate  Memory:  Intact  Orientation:  Person, Place, Time and Situation  Reliability:  fair  Insight:  Poor  Judgement:  Fair  Impulse Control:  Good  Physical/Medical Issuesprevious blood clotsYes previous blood clots    Assessment/Plan   Problems Addressed this Visit     GERMAN (generalized anxiety disorder)    Relevant Medications    escitalopram (LEXAPRO) 10 MG tablet    Severe episode of recurrent major depressive disorder, without psychotic features (CMS/HCC) - Primary    Relevant Medications    escitalopram (LEXAPRO) 10 MG tablet      Other Visit Diagnoses     Medication management            Functionality: pt having significant impairment in important areas of daily functioning.  Prognosis: Guarded dependent on medication/follow up and treatment plan compliance.      Discussed medication options.  At this time the family is very pleased with the patient's progress.  Won't increase the Lexapro on up to 10 mg.  If given them a prescription for them to take to the nursing home regarding this.  We discussed that if they are interested in ECT therapy for the patient we will set her up with Dr. Nguyen however right now the medication seems to be helping so we will go on up to 10 mg and see if it continues to improve her anxiety.  I will have her back to reassess in 3 weeks.   Reviewed the risks, benefits, and side effects of the medications; patient and family acknowledged and verbally consented.

## 2018-10-30 ENCOUNTER — OFFICE VISIT (OUTPATIENT)
Dept: PSYCHIATRY | Facility: CLINIC | Age: 73
End: 2018-10-30

## 2018-10-30 VITALS
WEIGHT: 116.2 LBS | BODY MASS INDEX: 24.39 KG/M2 | HEART RATE: 105 BPM | HEIGHT: 58 IN | DIASTOLIC BLOOD PRESSURE: 71 MMHG | SYSTOLIC BLOOD PRESSURE: 100 MMHG

## 2018-10-30 DIAGNOSIS — F33.2 SEVERE EPISODE OF RECURRENT MAJOR DEPRESSIVE DISORDER, WITHOUT PSYCHOTIC FEATURES (HCC): ICD-10-CM

## 2018-10-30 DIAGNOSIS — F41.1 GAD (GENERALIZED ANXIETY DISORDER): Primary | ICD-10-CM

## 2018-10-30 DIAGNOSIS — Z79.899 MEDICATION MANAGEMENT: ICD-10-CM

## 2018-10-30 PROCEDURE — 99214 OFFICE O/P EST MOD 30 MIN: CPT | Performed by: NURSE PRACTITIONER

## 2018-10-30 RX ORDER — ESCITALOPRAM OXALATE 5 MG/1
5 TABLET ORAL DAILY
Qty: 30 TABLET | Refills: 0 | Status: SHIPPED | OUTPATIENT
Start: 2018-10-30 | End: 2018-11-20 | Stop reason: SDUPTHER

## 2018-10-30 NOTE — PROGRESS NOTES
"      Subjective   Lacie Win is a 73 y.o. female is here today for medication management follow-up.    Chief Complaint:  Anxiety and depression recheck  History of Present Illness     Presents with her 1 sister and her daughter who is POA. Pt has seemed to get worse over the last week.  She is stating she cannot swallow and does not seem to have an appetite.  Family says that she will not eat anything hard and only likes soft things.  She says her throat is dry but will not drink the gatorade or water.  She is sleeping decent at night.  She says the nursing home she hears the other people suffering and she stares at the wall. Family says she will not participate in any of the activities the nursing home offers.  They also say her decreased appetite is a combination of both the consistency of the food and also her issue with not wanting to eat.  Pt says she is \"so tired of being sick\".  She denies any thoughts to hurt herself.  Denies any tremor.  No problems with gait upon walking in here.  Pt is still saying she cant walk at times.  Sister and daughter state that they are now really wanting to try ECT treatments.  She was placed inpatient before to do this and pt stopped eating and then ultimately refused.  They are wanting to try it as an outpatient.  Pt is willing today to try the treatments.  Sister states that their mother had them in the past and it really helped her.       The following portions of the patient's history were reviewed and updated as appropriate: allergies, current medications, past family history, past medical history, past social history, past surgical history and problem list.    Review of Systems   Constitutional: Negative for activity change, appetite change and fatigue.   HENT: Negative.    Eyes: Negative for visual disturbance.   Respiratory: Negative.    Cardiovascular: Negative.    Gastrointestinal: Negative for nausea.   Endocrine: Negative.    Genitourinary: Negative.  " "  Musculoskeletal: Negative for arthralgias and gait problem.   Skin: Negative.    Allergic/Immunologic: Negative.    Neurological: Negative for dizziness, seizures and headaches.   Hematological: Negative.    Psychiatric/Behavioral: Negative for agitation, behavioral problems, confusion, decreased concentration, dysphoric mood, hallucinations, self-injury, sleep disturbance and suicidal ideas. The patient is nervous/anxious. The patient is not hyperactive.        Objective   Physical Exam   Constitutional: She is oriented to person, place, and time. She appears well-developed and well-nourished.   Pt is more calm on this visit.  Sitting in chair and engaging in conversation   HENT:   Head: Normocephalic.   Neurological: She is alert and oriented to person, place, and time.   Psychiatric: Her speech is normal and behavior is normal. Thought content normal. Her mood appears anxious. Cognition and memory are normal.   Today pt is very restless and wringing her hands.  There is no tremor noted.  She will engage in conversation    Vitals reviewed.    Blood pressure 100/71, pulse 105, height 147.3 cm (57.99\"), weight 52.7 kg (116 lb 3.2 oz).    Medication List:   Current Outpatient Prescriptions   Medication Sig Dispense Refill   • amLODIPine (NORVASC) 5 MG tablet Take 5 mg by mouth Daily.     • apixaban (ELIQUIS) 5 MG tablet tablet Take 5 mg by mouth Every 12 (Twelve) Hours.     • escitalopram (LEXAPRO) 5 MG tablet Take 1 tablet by mouth Daily. 30 tablet 0   • loratadine (CLARITIN) 10 MG tablet Take 10 mg by mouth Daily.     • LORazepam (ATIVAN) 0.5 MG tablet Take 0.5 mg by mouth 2 (Two) Times a Day. Prior to [facility] Admission, Patient was on: takes at 0800 and 1400     • LORazepam (ATIVAN) 1 MG tablet Take 1 tablet by mouth Every Night. 30 tablet 0   • nortriptyline (PAMELOR) 25 MG capsule Take 1 capsule by mouth Every Night. 30 capsule 0     No current facility-administered medications for this visit.        Mental " Status Exam:   Hygiene:   good  Cooperation:  Cooperative  Eye Contact:  Fair  Psychomotor Behavior:  Restless  Affect:  Full range  Hopelessness: Denies  Speech:  Normal  Thought Process:  Unable to demonstrate  Thought Content:  panic  Suicidal:  None  Homicidal:  None  Hallucinations:  None  Delusion:  None  Memory:  Intact  Orientation:  Person, Place, Time and Situation  Reliability:  fair  Insight:  Poor  Judgement:  Fair  Impulse Control:  Good  Physical/Medical Issuesprevious blood clotsYes previous blood clots    Assessment/Plan   Problems Addressed this Visit     GERMAN (generalized anxiety disorder) - Primary    Relevant Medications    escitalopram (LEXAPRO) 5 MG tablet    Severe episode of recurrent major depressive disorder, without psychotic features (CMS/HCC)    Relevant Medications    escitalopram (LEXAPRO) 5 MG tablet      Other Visit Diagnoses     Medication management            Functionality: pt having significant impairment in important areas of daily functioning.  Prognosis: Guarded dependent on medication/follow up and treatment plan compliance.      Discussed medication options. At this time I am decreasing her lexapro back down to 5mg.  She is to continue the ativan the same dose. I discussed with the patient and the family members that I can set her up with Dr. Nguyen to see if she is still a candidate for ECT.  She will have to be medically cleared and they verbalize understanding of this.  I will set up an appointment with Dr. Nguyen for him to review the case.  .   Reviewed the risks, benefits, and side effects of the medications; patient and family acknowledged and verbally consented.

## 2018-11-07 ENCOUNTER — OFFICE VISIT (OUTPATIENT)
Dept: PSYCHIATRY | Facility: CLINIC | Age: 73
End: 2018-11-07

## 2018-11-07 VITALS
HEIGHT: 58 IN | HEART RATE: 96 BPM | DIASTOLIC BLOOD PRESSURE: 86 MMHG | BODY MASS INDEX: 24.35 KG/M2 | SYSTOLIC BLOOD PRESSURE: 123 MMHG | WEIGHT: 116 LBS

## 2018-11-07 DIAGNOSIS — F41.1 GAD (GENERALIZED ANXIETY DISORDER): ICD-10-CM

## 2018-11-07 DIAGNOSIS — F33.41 MDD (MAJOR DEPRESSIVE DISORDER), RECURRENT, IN PARTIAL REMISSION (HCC): Primary | ICD-10-CM

## 2018-11-07 PROCEDURE — 99214 OFFICE O/P EST MOD 30 MIN: CPT | Performed by: PSYCHIATRY & NEUROLOGY

## 2018-11-07 NOTE — PROGRESS NOTES
"Patient ID: Lacie Win is a 73 y.o. female    SERVICE TYPE: EVALUATION AND MANAGEMENT (greater than 50% of the time spent for supportive psychotherapy).  Time on: 1120    Time out: 1151    /86   Pulse 96   Ht 147.3 cm (57.99\")   Wt 52.6 kg (116 lb)   BMI 24.25 kg/m²     ALLERGIES:  Ciprofloxacin; Lamotrigine; Risperidone and related; Seroquel [quetiapine fumarate]; Sulfa antibiotics; Zoloft [sertraline hcl]; and Penicillins    CC/ Focus of the visit: Depression/ anxiety:     HPI: Asked to re evaluate this patient for ECT - last May was hospitalize specifically for ECT but refused to sign the consent form the day ECT was scheduled.     Reviewed the recent records from patient's contacts with SHERLEY Pacheco.     Here with daughter and her sister.     Patient: \"I'm better\". Family took her out of the [place] three days ago because patient had been refusing to eat saying all was hopeless.     For the first 3 days patient has been at her sister's home she has perked up, started to eat small amounts and verbalized that she was feeling better and more optimistic about her future.  Time will tell us whether this adjustment  will be what it takes to minimize the patient's depressive illness to the point that she is functioning fairly well with the support of her family.  Family  determined not to return her to the nursing home.    Patient and family agrees to continue with SHERLEY Pacheco as an outpatient, will let me know if more is needed. Patient to continue current meds as is.       PFSH: Patient will be residing at the sister's home.     Review of Systems   Respiratory: Negative.    Cardiovascular: Negative.    Gastrointestinal: Negative.        SUPPORTIVE PSYCHOTHERAPY: continuing efforts to promote the therapeutic alliance, address the patient’s issues, and strengthen self awareness, insights, and coping skills.       Mental Status Exam  Appearance:  clean and casually dressed, appropriate  Attitude toward " "clinician:  cooperative and agreeable   Speech:    Rate:  regular rate and rhythm   Volume: normal  Motor:  no abnormal movements present  Mood:  \"I feel good\".   Affect:  Euthymic today, no longer saying she can not eat, drink, walk, etc.   Thought Processes:  linear, logical, and goal directed  Thought Content:  Normal   Feeling Hopeless: absent  Suicidal Thoughts:  absent  Homicidal Thoughts:  absent  Perceptual Disturbance: no perceptual disturbance  Attention and Concentration:  fair  Insight and Judgement:  Fair  Memory:  memory appears to be intact    LABS: No results found for this or any previous visit (from the past 168 hour(s)).    MEDICATION ISSUES: Have discussed with the patient the medications Risks, Benefits, and Side effects including potential falls, possible impaired driving and  metabolic adversities among others. No medication side effects or related complaints today.     TREATMENT PLAN/GOALS: Continue supportive psychotherapy efforts and medications as indicated.     Current Outpatient Prescriptions   Medication Sig Dispense Refill   • amLODIPine (NORVASC) 5 MG tablet Take 5 mg by mouth Daily.     • apixaban (ELIQUIS) 5 MG tablet tablet Take 5 mg by mouth Every 12 (Twelve) Hours.     • escitalopram (LEXAPRO) 5 MG tablet Take 1 tablet by mouth Daily. 30 tablet 0   • loratadine (CLARITIN) 10 MG tablet Take 10 mg by mouth Daily.     • LORazepam (ATIVAN) 0.5 MG tablet Take 0.5 mg by mouth 2 (Two) Times a Day. Prior to [facility] Admission, Patient was on: takes at 0800 and 1400     • LORazepam (ATIVAN) 1 MG tablet Take 1 tablet by mouth Every Night. 30 tablet 0   • nortriptyline (PAMELOR) 25 MG capsule Take 1 capsule by mouth Every Night. 30 capsule 0     No current facility-administered medications for this visit.        COLLATERAL PSYCHOTHERAPEUTIC INTERVENTION: patient not interested in additional psychotherapy.    RISK:  Moderate.     Encounter Diagnoses   Name Primary?   • MDD (major depressive " disorder), recurrent, in partial remission (CMS/HCC) Yes   • GERMAN (generalized anxiety disorder)        Patient to have an appointment to see SHERLEY Pacheco in 2 weeks.     Patient knows to call if symptoms worsen  between appointments.     Dictated utilizing Dragon dictation

## 2018-11-20 ENCOUNTER — OFFICE VISIT (OUTPATIENT)
Dept: PSYCHIATRY | Facility: CLINIC | Age: 73
End: 2018-11-20

## 2018-11-20 VITALS
DIASTOLIC BLOOD PRESSURE: 76 MMHG | BODY MASS INDEX: 24.14 KG/M2 | HEART RATE: 104 BPM | SYSTOLIC BLOOD PRESSURE: 112 MMHG | WEIGHT: 115 LBS | HEIGHT: 58 IN

## 2018-11-20 DIAGNOSIS — F33.2 SEVERE EPISODE OF RECURRENT MAJOR DEPRESSIVE DISORDER, WITHOUT PSYCHOTIC FEATURES (HCC): ICD-10-CM

## 2018-11-20 DIAGNOSIS — F41.1 GAD (GENERALIZED ANXIETY DISORDER): ICD-10-CM

## 2018-11-20 DIAGNOSIS — Z79.899 MEDICATION MANAGEMENT: ICD-10-CM

## 2018-11-20 DIAGNOSIS — F33.41 MDD (MAJOR DEPRESSIVE DISORDER), RECURRENT, IN PARTIAL REMISSION (HCC): Primary | ICD-10-CM

## 2018-11-20 PROCEDURE — 99214 OFFICE O/P EST MOD 30 MIN: CPT | Performed by: NURSE PRACTITIONER

## 2018-11-20 RX ORDER — LORAZEPAM 1 MG/1
TABLET ORAL
Qty: 60 TABLET | Refills: 0 | Status: SHIPPED | OUTPATIENT
Start: 2018-11-20 | End: 2018-11-20 | Stop reason: SDUPTHER

## 2018-11-20 RX ORDER — ESCITALOPRAM OXALATE 5 MG/1
5 TABLET ORAL DAILY
Qty: 30 TABLET | Refills: 1 | Status: SHIPPED | OUTPATIENT
Start: 2018-11-20 | End: 2019-01-22 | Stop reason: SDUPTHER

## 2018-11-20 RX ORDER — NORTRIPTYLINE HYDROCHLORIDE 25 MG/1
25 CAPSULE ORAL NIGHTLY
Qty: 30 CAPSULE | Refills: 1 | Status: SHIPPED | OUTPATIENT
Start: 2018-11-20 | End: 2019-01-22 | Stop reason: SDUPTHER

## 2018-11-20 RX ORDER — LORAZEPAM 1 MG/1
TABLET ORAL
Qty: 60 TABLET | Refills: 0 | Status: SHIPPED | OUTPATIENT
Start: 2018-11-20 | End: 2019-01-22 | Stop reason: SDUPTHER

## 2018-11-20 NOTE — PROGRESS NOTES
Subjective   Lacie Win is a 73 y.o. female is here today for medication management follow-up.    Chief Complaint:  Anxiety and depression recheck  History of Present Illness: presents with her 2 sisters with whom she gives permission to speak in front of.  They have taken her out of the nursing home.  She is now home with one of them staying with her at all times.  She saw Dr. Nguyen for an appointment regarding possible ECT treatments but her depression was better being out of the nursing home so this has been put on hold.  She is to continue her current medications.  Sr. states that she is doing well.  Patient writes her current anxiety and depression both a 5 out of 10 with 10 being worst.  She denies any suicidal thoughts, homicidal thoughts, or any AV hallucinations.  She is sleeping well at least 8 hours a night per her sisters.  Patient still has times where she sits and states that she worries about being a burden to her sisters and states that she talks too much.  Her sister state that they love the fact she is talking a lot however she has a lot of negative thoughts self image and that she feels more incapable of doing things that she really is.Body mass index is 24.04 kg/m².  She denies any appetite changes.  Sisters are really working at making sure the patient is eating and by states she seems to be eating decently well.  Mood seems to be stable and she is not having any crying spells.  They are currently pleased with her progress. No current medical stressors.  Dr. Barker note reviewed.         The following portions of the patient's history were reviewed and updated as appropriate: allergies, current medications, past family history, past medical history, past social history, past surgical history and problem list.    Review of Systems   Constitutional: Negative for activity change, appetite change and fatigue.   HENT: Negative.    Eyes: Negative for visual disturbance.   Respiratory:  "Negative.    Cardiovascular: Negative.    Gastrointestinal: Negative for nausea.   Endocrine: Negative.    Genitourinary: Negative.    Musculoskeletal: Negative for arthralgias and gait problem.   Skin: Negative.    Allergic/Immunologic: Negative.    Neurological: Negative for dizziness, seizures and headaches.   Hematological: Negative.    Psychiatric/Behavioral: Negative for agitation, behavioral problems, confusion, decreased concentration, dysphoric mood, hallucinations, self-injury, sleep disturbance and suicidal ideas. The patient is nervous/anxious. The patient is not hyperactive.        Objective   Physical Exam   Constitutional: She is oriented to person, place, and time. She appears well-developed and well-nourished.   Pt is more calm on this visit.  Sitting in chair and engaging in conversation   HENT:   Head: Normocephalic.   Neurological: She is alert and oriented to person, place, and time.   Psychiatric: Her speech is normal and behavior is normal. Thought content normal. Her mood appears anxious. Cognition and memory are normal.   Engaging in conversation and appears restless at times but not wringing her hands.She is joking some.      Vitals reviewed.    Blood pressure 112/76, pulse 104, height 147.3 cm (57.99\"), weight 52.2 kg (115 lb).    Medication List:   Current Outpatient Medications   Medication Sig Dispense Refill   • amLODIPine (NORVASC) 5 MG tablet Take 5 mg by mouth Daily.     • apixaban (ELIQUIS) 5 MG tablet tablet Take 5 mg by mouth Every 12 (Twelve) Hours.     • escitalopram (LEXAPRO) 5 MG tablet Take 1 tablet by mouth Daily. 30 tablet 1   • loratadine (CLARITIN) 10 MG tablet Take 10 mg by mouth Daily.     • LORazepam (ATIVAN) 1 MG tablet Take 1/2 tablet in the AM  1/2 in the afternoon  1 whole tablet at HS 60 tablet 0   • nortriptyline (PAMELOR) 25 MG capsule Take 1 capsule by mouth Every Night. 30 capsule 1     No current facility-administered medications for this visit.  "       Mental Status Exam:   Hygiene:   good  Cooperation:  Cooperative  Eye Contact:  Fair  Psychomotor Behavior:  Restless  Affect:  Full range  Hopelessness: Denies  Speech:  Normal  Thought Process:  Unable to demonstrate  Thought Content:  panic  Suicidal:  None  Homicidal:  None  Hallucinations:  None  Delusion:  None  Memory:  Intact  Orientation:  Person, Place, Time and Situation  Reliability:  fair  Insight:  Poor  Judgement:  Fair  Impulse Control:  Good  Physical/Medical Issuesprevious blood clotsYes previous blood clots    Assessment/Plan   Problems Addressed this Visit     GERMAN (generalized anxiety disorder)    Relevant Medications    escitalopram (LEXAPRO) 5 MG tablet    nortriptyline (PAMELOR) 25 MG capsule    LORazepam (ATIVAN) 1 MG tablet    Severe episode of recurrent major depressive disorder, without psychotic features (CMS/HCC)    Relevant Medications    escitalopram (LEXAPRO) 5 MG tablet    nortriptyline (PAMELOR) 25 MG capsule    LORazepam (ATIVAN) 1 MG tablet      Other Visit Diagnoses     MDD (major depressive disorder), recurrent, in partial remission (CMS/HCC)    -  Primary    Relevant Medications    escitalopram (LEXAPRO) 5 MG tablet    nortriptyline (PAMELOR) 25 MG capsule    LORazepam (ATIVAN) 1 MG tablet    Medication management            Functionality: pt having significant impairment in important areas of daily functioning.  Prognosis: Guarded dependent on medication/follow up and treatment plan compliance.  Narcotic agreement was reviewed and signed.     Will continue the patient on the medication she was receiving at the nursing home which is ativan, nortriptylline, and lexapro  .   Reviewed the risks, benefits, and side effects of the medications; patient and family acknowledged and verbally consented.  They are aware that ativan is a narcotic and can cause dizziness.  Should be kept in a secure place. The sisters seem to be a great support for the patient and take excellent care  of her. RTC 8 weeks.  They will call me should any problems arise.

## 2019-01-22 ENCOUNTER — OFFICE VISIT (OUTPATIENT)
Dept: PSYCHIATRY | Facility: CLINIC | Age: 74
End: 2019-01-22

## 2019-01-22 VITALS
HEART RATE: 101 BPM | HEIGHT: 58 IN | SYSTOLIC BLOOD PRESSURE: 117 MMHG | DIASTOLIC BLOOD PRESSURE: 78 MMHG | WEIGHT: 114.8 LBS | BODY MASS INDEX: 24.1 KG/M2

## 2019-01-22 DIAGNOSIS — F01.518 VASCULAR DEMENTIA WITH BEHAVIOR DISTURBANCE (HCC): ICD-10-CM

## 2019-01-22 DIAGNOSIS — Z79.899 MEDICATION MANAGEMENT: ICD-10-CM

## 2019-01-22 DIAGNOSIS — F41.1 GAD (GENERALIZED ANXIETY DISORDER): Primary | ICD-10-CM

## 2019-01-22 DIAGNOSIS — F33.2 SEVERE EPISODE OF RECURRENT MAJOR DEPRESSIVE DISORDER, WITHOUT PSYCHOTIC FEATURES (HCC): ICD-10-CM

## 2019-01-22 PROCEDURE — 99214 OFFICE O/P EST MOD 30 MIN: CPT | Performed by: NURSE PRACTITIONER

## 2019-01-22 RX ORDER — ESCITALOPRAM OXALATE 5 MG/1
5 TABLET ORAL DAILY
Qty: 30 TABLET | Refills: 1 | Status: ON HOLD | OUTPATIENT
Start: 2019-01-22 | End: 2021-08-16

## 2019-01-22 RX ORDER — NORTRIPTYLINE HYDROCHLORIDE 25 MG/1
25 CAPSULE ORAL NIGHTLY
Qty: 30 CAPSULE | Refills: 1 | Status: ON HOLD | OUTPATIENT
Start: 2019-01-22 | End: 2021-08-16

## 2019-01-22 RX ORDER — NORTRIPTYLINE HYDROCHLORIDE 10 MG/1
CAPSULE ORAL
Qty: 30 CAPSULE | Refills: 5 | Status: ON HOLD | OUTPATIENT
Start: 2019-01-22 | End: 2021-08-16

## 2019-01-22 RX ORDER — LORAZEPAM 1 MG/1
TABLET ORAL
Qty: 60 TABLET | Refills: 0 | Status: SHIPPED | OUTPATIENT
Start: 2019-01-22

## 2019-01-22 NOTE — PROGRESS NOTES
"      Subjective   Lacie Win is a 73 y.o. female is here today for medication management follow-up.    Chief Complaint:  Anxiety and depression recheck  History of Present Illness: presents with her 2 sisters with whom she gives permission to speak in front of. Family says pt has been back in the nursing home for short time since last visit. Family says they were having to rotate shifts and pt was getting confused going to different places and it was confusing. Family states she is doing fair at the nursing home. Body mass index is 24 kg/m². appetite is good.  Sleeping well at night. Depression seems stable to the family it is more the anxiety that seems more upsetting.  Pt does not exhibit any self harm or verbalize any thoughts of this. Denies any a/V hallucinations.Pts memory is usually intact but they state she has \"spells\" of confusion.  Family states pt is gets up very nervous and after she takes the half of ativan she calms down.  Gets another dose at 2 PM then she is \"off the chart\" at 4 PM.  From 4 PM to 8 PM she is very nervous and shaking even hysterical at times.  Saying \"I cant walk\" and looking outside and saying \"the grass is dead\".  Going up on the lexapro in the past made her develop a tremor and shaky.  There are no current medical stressors.  The current ativan dosing is not causing drowsiness.       The following portions of the patient's history were reviewed and updated as appropriate: allergies, current medications, past family history, past medical history, past social history, past surgical history and problem list.    Review of Systems   Constitutional: Negative for activity change, appetite change and fatigue.   HENT: Negative.    Eyes: Negative for visual disturbance.   Respiratory: Negative.    Cardiovascular: Negative.    Gastrointestinal: Negative for nausea.   Endocrine: Negative.    Genitourinary: Negative.    Musculoskeletal: Negative for arthralgias and gait problem.   Skin: " "Negative.    Allergic/Immunologic: Negative.    Neurological: Negative for dizziness, seizures and headaches.   Hematological: Negative.    Psychiatric/Behavioral: Negative for agitation, behavioral problems, confusion, decreased concentration, dysphoric mood, hallucinations, self-injury, sleep disturbance and suicidal ideas. The patient is nervous/anxious. The patient is not hyperactive.        Objective   Physical Exam   Constitutional: She is oriented to person, place, and time. She appears well-developed and well-nourished.   Pt is more calm on this visit.  Sitting in chair and engaging in conversation   HENT:   Head: Normocephalic.   Neurological: She is alert and oriented to person, place, and time.   Psychiatric: Her speech is normal and behavior is normal. Thought content normal. Her mood appears anxious. Cognition and memory are normal.   Engaging in conversation and appears restless at times but not wringing her hands.She is joking some.      Vitals reviewed.    Blood pressure 117/78, pulse 101, height 147.3 cm (57.99\"), weight 52.1 kg (114 lb 12.8 oz).    Medication List:   Current Outpatient Medications   Medication Sig Dispense Refill   • amLODIPine (NORVASC) 5 MG tablet Take 5 mg by mouth Daily.     • apixaban (ELIQUIS) 5 MG tablet tablet Take 5 mg by mouth Every 12 (Twelve) Hours.     • escitalopram (LEXAPRO) 5 MG tablet Take 1 tablet by mouth Daily. 30 tablet 1   • loratadine (CLARITIN) 10 MG tablet Take 10 mg by mouth Daily.     • LORazepam (ATIVAN) 1 MG tablet Take 1/2 tablet in the AM  1/2 in the afternoon  1 whole tablet at HS 60 tablet 0   • nortriptyline (PAMELOR) 10 MG capsule Take 1 capsule at 2 PM 30 capsule 5   • nortriptyline (PAMELOR) 25 MG capsule Take 1 capsule by mouth Every Night. 30 capsule 1     No current facility-administered medications for this visit.        Mental Status Exam:   Hygiene:   good  Cooperation:  Cooperative  Eye Contact:  Fair  Psychomotor Behavior:  " Restless  Affect:  Full range  Hopelessness: Denies  Speech:  Normal  Thought Process:  Unable to demonstrate  Thought Content:  panic  Suicidal:  None  Homicidal:  None  Hallucinations:  None  Delusion:  None  Memory:  Intact  Orientation:  Person, Place, Time and Situation  Reliability:  fair  Insight:  Poor  Judgement:  Fair  Impulse Control:  Good  Physical/Medical Issuesprevious blood clotsYes previous blood clots    Assessment/Plan   Problems Addressed this Visit        Nervous and Auditory    Vascular dementia    Relevant Medications    LORazepam (ATIVAN) 1 MG tablet    escitalopram (LEXAPRO) 5 MG tablet    nortriptyline (PAMELOR) 25 MG capsule    nortriptyline (PAMELOR) 10 MG capsule       Other    GERMAN (generalized anxiety disorder) - Primary    Relevant Medications    LORazepam (ATIVAN) 1 MG tablet    escitalopram (LEXAPRO) 5 MG tablet    nortriptyline (PAMELOR) 25 MG capsule    nortriptyline (PAMELOR) 10 MG capsule    Severe episode of recurrent major depressive disorder, without psychotic features (CMS/HCC)    Relevant Medications    LORazepam (ATIVAN) 1 MG tablet    escitalopram (LEXAPRO) 5 MG tablet    nortriptyline (PAMELOR) 25 MG capsule    nortriptyline (PAMELOR) 10 MG capsule      Other Visit Diagnoses     Medication management            Functionality: pt having significant impairment in important areas of daily functioning.  Prognosis: Guarded dependent on medication/follow up and treatment plan compliance.      Will continue the patient on the medication she was receiving at the nursing home which is ativan, nortriptylline, and lexapro. Had lengthy discussion with pt and sisters in that the use of benzos in the elderly is dangerous and we need to try something else for her anxiety first.  Instead of increasing the ativan I am adding a low dose pamelor 10mg at 2 PM hoping to help with pts anxiety and behaviors.   They are in agreement.  I am having her return to clinic in 3 weeks.  Future plans if  medication does not work will be to see Dr. Nguyen for evaluation for ECT therapy. Reviewed the risks, benefits, and side effects of the medications; patient and family acknowledged and verbally consented.  The sisters seem to be a great support for the patient . RTC 3 weeks.  They will call me should any problems arise.

## 2020-05-21 ENCOUNTER — OFFICE VISIT (OUTPATIENT)
Dept: SURGERY | Facility: CLINIC | Age: 75
End: 2020-05-21

## 2020-05-21 VITALS
DIASTOLIC BLOOD PRESSURE: 96 MMHG | SYSTOLIC BLOOD PRESSURE: 121 MMHG | WEIGHT: 103 LBS | BODY MASS INDEX: 21.62 KG/M2 | HEART RATE: 83 BPM | HEIGHT: 58 IN

## 2020-05-21 DIAGNOSIS — R13.19 ESOPHAGEAL DYSPHAGIA: ICD-10-CM

## 2020-05-21 DIAGNOSIS — K80.10 CALCULUS OF GALLBLADDER WITH CHRONIC CHOLECYSTITIS WITHOUT OBSTRUCTION: Primary | ICD-10-CM

## 2020-05-21 PROCEDURE — 99204 OFFICE O/P NEW MOD 45 MIN: CPT | Performed by: SURGERY

## 2020-05-21 RX ORDER — HYDROXYZINE HYDROCHLORIDE 25 MG/1
25 TABLET, FILM COATED ORAL 3 TIMES DAILY PRN
COMMUNITY

## 2020-05-21 RX ORDER — ACETAMINOPHEN 500 MG
500 TABLET ORAL EVERY 6 HOURS PRN
COMMUNITY

## 2020-05-21 RX ORDER — POLYETHYLENE GLYCOL 3350 17 G/17G
17 POWDER, FOR SOLUTION ORAL DAILY
COMMUNITY

## 2020-05-21 RX ORDER — DOCUSATE SODIUM 250 MG
250 CAPSULE ORAL DAILY
COMMUNITY

## 2020-05-21 RX ORDER — BISACODYL 5 MG/1
10 TABLET, DELAYED RELEASE ORAL DAILY PRN
COMMUNITY

## 2020-05-21 RX ORDER — MULTIPLE VITAMINS W/ MINERALS TAB 9MG-400MCG
1 TAB ORAL DAILY
COMMUNITY

## 2020-05-21 NOTE — PROGRESS NOTES
Subjective   Lacie Win is a 74 y.o. female.     Chief Complaint: gallstones    History of Present Illness She is a 75 yo who has had some trouble eating for several months. She has some dysphagia but no recent EGD. She has discomfort but not really pain. She has been losing weight. She is on eliquis for several years for DVT. She has had a open splenectomy for cancer years ago.     The following portions of the patient's history were reviewed and updated as appropriate: current medications, past family history, past medical history, past social history, past surgical history and problem list.    Review of Systems   Constitutional: Negative for activity change, appetite change, chills, fever and unexpected weight change.   HENT: Negative for congestion, facial swelling and sore throat.    Eyes: Negative for photophobia and visual disturbance.   Respiratory: Negative for chest tightness, shortness of breath and wheezing.    Cardiovascular: Negative for chest pain, palpitations and leg swelling.   Gastrointestinal: Positive for abdominal pain and nausea. Negative for abdominal distention, anal bleeding, blood in stool, constipation, diarrhea, rectal pain and vomiting.   Endocrine: Negative for cold intolerance, heat intolerance, polydipsia and polyuria.   Genitourinary: Negative for difficulty urinating, dysuria, flank pain and urgency.   Musculoskeletal: Negative for back pain and myalgias.   Skin: Negative for rash and wound.   Allergic/Immunologic: Negative for immunocompromised state.   Neurological: Negative for dizziness, seizures, syncope, light-headedness, numbness and headaches.   Hematological: Negative for adenopathy. Does not bruise/bleed easily.   Psychiatric/Behavioral: Negative for behavioral problems and confusion. The patient is not nervous/anxious.        Objective   Physical Exam   Constitutional: She is oriented to person, place, and time. She appears well-developed and well-nourished. She does  not appear ill. No distress.   HENT:   Head: Normocephalic. Head is without laceration. Hair is normal.   Right Ear: Hearing and ear canal normal.   Left Ear: Hearing and ear canal normal.   Nose: Nose normal. No sinus tenderness. No epistaxis. Right sinus exhibits no maxillary sinus tenderness and no frontal sinus tenderness. Left sinus exhibits no maxillary sinus tenderness and no frontal sinus tenderness.   Eyes: Pupils are equal, round, and reactive to light. Conjunctivae and lids are normal.   Neck: Normal range of motion. No JVD present. No tracheal tenderness present. No tracheal deviation present. No thyroid mass and no thyromegaly present.   Cardiovascular: Normal rate and regular rhythm. Exam reveals no gallop.   No murmur heard.  Pulmonary/Chest: Effort normal and breath sounds normal. No stridor. She has no wheezes. She exhibits no tenderness.   Abdominal: Soft. Bowel sounds are normal. She exhibits no distension, no ascites and no mass. There is no tenderness. There is no rebound and no guarding. No hernia.   Musculoskeletal: She exhibits no edema or deformity.   Lymphadenopathy:     She has no cervical adenopathy.     She has no axillary adenopathy.        Right: No inguinal and no supraclavicular adenopathy present.        Left: No inguinal and no supraclavicular adenopathy present.   Neurological: She is alert and oriented to person, place, and time. She exhibits normal muscle tone.   Skin: Skin is warm, dry and intact. No rash noted. No erythema. No pallor.   Psychiatric: She has a normal mood and affect. Her behavior is normal. Thought content normal.   Vitals reviewed.      Assessment/Plan   Lacie was seen today for cholelithiasis.    Diagnoses and all orders for this visit:    Calculus of gallbladder with chronic cholecystitis without obstruction  -     Case Request; Standing  -     Case Request    Esophageal dysphagia    Other orders  -     SCANNED - IMAGING  -     Follow Anesthesia Guidelines  / Standing Orders; Future  -     Provide NPO Instructions to Patient; Future  -     Chlorhexidine Skin Prep; Future  -     Obtain informed consent    lap benito and EGD    She will need a lap benito and EGD.

## 2020-05-22 ENCOUNTER — HOSPITAL ENCOUNTER (OUTPATIENT)
Facility: HOSPITAL | Age: 75
Setting detail: HOSPITAL OUTPATIENT SURGERY
End: 2020-05-22
Attending: SURGERY | Admitting: SURGERY

## 2020-05-27 ENCOUNTER — APPOINTMENT (OUTPATIENT)
Dept: PREADMISSION TESTING | Facility: HOSPITAL | Age: 75
End: 2020-05-27

## 2020-06-05 ENCOUNTER — TRANSCRIBE ORDERS (OUTPATIENT)
Dept: ADMINISTRATIVE | Facility: HOSPITAL | Age: 75
End: 2020-06-05

## 2020-06-05 DIAGNOSIS — K62.5 RECTAL BLEEDING: Primary | ICD-10-CM

## 2020-06-08 ENCOUNTER — TRANSCRIBE ORDERS (OUTPATIENT)
Dept: ADMINISTRATIVE | Facility: HOSPITAL | Age: 75
End: 2020-06-08

## 2020-06-08 DIAGNOSIS — K62.5 RECTAL BLEEDING: Primary | ICD-10-CM

## 2020-06-11 ENCOUNTER — APPOINTMENT (OUTPATIENT)
Dept: CT IMAGING | Facility: HOSPITAL | Age: 75
End: 2020-06-11

## 2020-06-11 ENCOUNTER — HOSPITAL ENCOUNTER (OUTPATIENT)
Dept: CT IMAGING | Facility: HOSPITAL | Age: 75
Discharge: HOME | End: 2020-06-11
Admitting: INTERNAL MEDICINE

## 2020-06-11 DIAGNOSIS — K62.5 RECTAL BLEEDING: ICD-10-CM

## 2020-06-11 PROCEDURE — 0 IOVERSOL 68 % SOLUTION: Performed by: INTERNAL MEDICINE

## 2020-06-11 PROCEDURE — 74177 CT ABD & PELVIS W/CONTRAST: CPT

## 2020-06-11 PROCEDURE — 74177 CT ABD & PELVIS W/CONTRAST: CPT | Performed by: RADIOLOGY

## 2020-06-11 PROCEDURE — 82565 ASSAY OF CREATININE: CPT

## 2020-06-11 RX ORDER — SODIUM CHLORIDE 9 MG/ML
125 INJECTION, SOLUTION INTRAVENOUS CONTINUOUS
Status: DISCONTINUED | OUTPATIENT
Start: 2020-06-11 | End: 2020-06-12 | Stop reason: HOSPADM

## 2020-06-11 RX ADMIN — IOVERSOL 100 ML: 678 INJECTION INTRA-ARTERIAL; INTRAVENOUS at 10:48

## 2020-06-12 LAB — CREAT BLDA-MCNC: 0.9 MG/DL (ref 0.6–1.3)

## 2020-06-18 ENCOUNTER — OFFICE VISIT (OUTPATIENT)
Dept: SURGERY | Facility: CLINIC | Age: 75
End: 2020-06-18

## 2020-06-18 VITALS — HEIGHT: 60 IN | WEIGHT: 103 LBS | BODY MASS INDEX: 20.22 KG/M2

## 2020-06-18 DIAGNOSIS — K62.5 RECTAL BLEEDING: Primary | ICD-10-CM

## 2020-06-18 PROCEDURE — 99213 OFFICE O/P EST LOW 20 MIN: CPT | Performed by: SURGERY

## 2020-06-18 RX ORDER — SODIUM, POTASSIUM,MAG SULFATES 17.5-3.13G
SOLUTION, RECONSTITUTED, ORAL ORAL
Qty: 2 BOTTLE | Refills: 0 | Status: SHIPPED | OUTPATIENT
Start: 2020-06-18 | End: 2020-08-20

## 2020-06-18 NOTE — PROGRESS NOTES
Subjective   Lacie Win is a 74 y.o. female.     Chief Complaint: rectal bleeding    History of Present Illness She had a diarrhea with some rectal bleeding about a week ago. She feels ok now. A CT done showed colon wall thickening. She had a colon resection for polyps several years ago. No recent colonoscopies. 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     The following portions of the patient's history were reviewed and updated as appropriate: current medications, past family history, past medical history, past social history, past surgical history and problem list.    Review of Systems   Constitutional: Negative for activity change, appetite change, chills, fever and unexpected weight change.   HENT: Negative for congestion, facial swelling and sore throat.    Eyes: Negative for photophobia and visual disturbance.   Respiratory: Negative for chest tightness, shortness of breath and wheezing.    Cardiovascular: Negative for chest pain, palpitations and leg swelling.   Gastrointestinal: Positive for blood in stool and diarrhea. Negative for abdominal distention, abdominal pain, anal bleeding, constipation, nausea, rectal pain and vomiting.   Endocrine: Negative for cold intolerance, heat intolerance, polydipsia and polyuria.   Genitourinary: Negative for difficulty urinating, dysuria, flank pain and urgency.   Musculoskeletal: Negative for back pain and myalgias.   Skin: Negative for rash and wound.   Allergic/Immunologic: Negative for immunocompromised state.   Neurological: Negative for dizziness, seizures, syncope, light-headedness, numbness and headaches.   Hematological: Negative for adenopathy. Does not bruise/bleed easily.   Psychiatric/Behavioral: Negative for behavioral problems and confusion. The patient is not nervous/anxious.        Objective   Physical Exam   Constitutional: She is oriented to person, place, and time. She appears well-developed and well-nourished. She does not appear ill. No  distress.   HENT:   Head: Normocephalic. Head is without laceration. Hair is normal.   Right Ear: Hearing and ear canal normal.   Left Ear: Hearing and ear canal normal.   Nose: Nose normal. No sinus tenderness. No epistaxis. Right sinus exhibits no maxillary sinus tenderness and no frontal sinus tenderness. Left sinus exhibits no maxillary sinus tenderness and no frontal sinus tenderness.   Eyes: Pupils are equal, round, and reactive to light. Conjunctivae and lids are normal.   Neck: Normal range of motion. No JVD present. No tracheal tenderness present. No tracheal deviation present. No thyroid mass and no thyromegaly present.   Cardiovascular: Normal rate and regular rhythm. Exam reveals no gallop.   No murmur heard.  Pulmonary/Chest: Effort normal and breath sounds normal. No stridor. She has no wheezes. She exhibits no tenderness.   Abdominal: Soft. Bowel sounds are normal. She exhibits no distension, no ascites and no mass. There is no tenderness. There is no rebound and no guarding. No hernia.   Musculoskeletal: She exhibits no edema or deformity.   Lymphadenopathy:     She has no cervical adenopathy.     She has no axillary adenopathy.        Right: No inguinal and no supraclavicular adenopathy present.        Left: No inguinal and no supraclavicular adenopathy present.   Neurological: She is alert and oriented to person, place, and time. She exhibits normal muscle tone.   Skin: Skin is warm, dry and intact. No rash noted. No erythema. No pallor.   Psychiatric: She has a normal mood and affect. Her behavior is normal. Thought content normal.   Vitals reviewed.       Assessment/Plan   Lacie was seen today for bowel wall thickening.    Diagnoses and all orders for this visit:    Rectal bleeding    colonoscopy

## 2020-06-19 ENCOUNTER — HOSPITAL ENCOUNTER (OUTPATIENT)
Facility: HOSPITAL | Age: 75
Setting detail: HOSPITAL OUTPATIENT SURGERY
Discharge: HOME | End: 2020-06-19
Attending: SURGERY | Admitting: SURGERY

## 2020-06-22 DIAGNOSIS — K62.5 RECTAL BLEEDING: Primary | ICD-10-CM

## 2020-06-22 RX ORDER — SODIUM, POTASSIUM,MAG SULFATES 17.5-3.13G
1 SOLUTION, RECONSTITUTED, ORAL ORAL EVERY 12 HOURS
Qty: 1 BOTTLE | Refills: 0 | Status: SHIPPED | OUTPATIENT
Start: 2020-06-22 | End: 2020-08-20

## 2020-06-23 ENCOUNTER — TRANSCRIBE ORDERS (OUTPATIENT)
Dept: ADMINISTRATIVE | Facility: HOSPITAL | Age: 75
End: 2020-06-23

## 2020-06-23 DIAGNOSIS — Z01.818 OTHER SPECIFIED PRE-OPERATIVE EXAMINATION: Primary | ICD-10-CM

## 2020-07-06 ENCOUNTER — TELEPHONE (OUTPATIENT)
Dept: SURGERY | Facility: CLINIC | Age: 75
End: 2020-07-06

## 2020-07-06 NOTE — TELEPHONE ENCOUNTER
Called [facility] and spoke to patient's nurse. They are aware of her clear liquid diet, NPO status, and arrival time for her colonoscopy 7/10 @ 730

## 2020-08-03 ENCOUNTER — HOSPITAL ENCOUNTER (EMERGENCY)
Facility: HOSPITAL | Age: 75
Discharge: NURSING, REHAB OR LONG-TERM CARE | End: 2020-08-03
Attending: EMERGENCY MEDICINE | Admitting: EMERGENCY MEDICINE

## 2020-08-03 VITALS
WEIGHT: 113 LBS | BODY MASS INDEX: 20.8 KG/M2 | RESPIRATION RATE: 16 BRPM | TEMPERATURE: 98.3 F | OXYGEN SATURATION: 96 % | DIASTOLIC BLOOD PRESSURE: 86 MMHG | HEART RATE: 80 BPM | HEIGHT: 62 IN | SYSTOLIC BLOOD PRESSURE: 142 MMHG

## 2020-08-03 DIAGNOSIS — K62.5 BRIGHT RED BLOOD PER RECTUM: Primary | ICD-10-CM

## 2020-08-03 LAB
ALBUMIN SERPL-MCNC: 4.02 G/DL (ref 3.5–5.2)
ALBUMIN/GLOB SERPL: 1.3 G/DL
ALP SERPL-CCNC: 72 U/L (ref 39–117)
ALT SERPL W P-5'-P-CCNC: 12 U/L (ref 1–33)
ANION GAP SERPL CALCULATED.3IONS-SCNC: 10.2 MMOL/L (ref 5–15)
APTT PPP: 31.9 SECONDS (ref 25.6–35.3)
AST SERPL-CCNC: 19 U/L (ref 1–32)
BASOPHILS # BLD AUTO: 0.05 10*3/MM3 (ref 0–0.2)
BASOPHILS NFR BLD AUTO: 0.6 % (ref 0–1.5)
BILIRUB SERPL-MCNC: 0.2 MG/DL (ref 0–1.2)
BUN SERPL-MCNC: 19 MG/DL (ref 8–23)
BUN/CREAT SERPL: 28.4 (ref 7–25)
CALCIUM SPEC-SCNC: 9.2 MG/DL (ref 8.6–10.5)
CHLORIDE SERPL-SCNC: 99 MMOL/L (ref 98–107)
CO2 SERPL-SCNC: 26.8 MMOL/L (ref 22–29)
CREAT SERPL-MCNC: 0.67 MG/DL (ref 0.57–1)
DEPRECATED RDW RBC AUTO: 47.1 FL (ref 37–54)
EOSINOPHIL # BLD AUTO: 0.11 10*3/MM3 (ref 0–0.4)
EOSINOPHIL NFR BLD AUTO: 1.3 % (ref 0.3–6.2)
ERYTHROCYTE [DISTWIDTH] IN BLOOD BY AUTOMATED COUNT: 13.8 % (ref 12.3–15.4)
GFR SERPL CREATININE-BSD FRML MDRD: 86 ML/MIN/1.73
GLOBULIN UR ELPH-MCNC: 3 GM/DL
GLUCOSE SERPL-MCNC: 97 MG/DL (ref 65–99)
HCT VFR BLD AUTO: 39.5 % (ref 34–46.6)
HGB BLD-MCNC: 13.2 G/DL (ref 12–15.9)
IMM GRANULOCYTES # BLD AUTO: 0.02 10*3/MM3 (ref 0–0.05)
IMM GRANULOCYTES NFR BLD AUTO: 0.2 % (ref 0–0.5)
INR PPP: 1 (ref 0.9–1.1)
LYMPHOCYTES # BLD AUTO: 2.64 10*3/MM3 (ref 0.7–3.1)
LYMPHOCYTES NFR BLD AUTO: 31.1 % (ref 19.6–45.3)
MCH RBC QN AUTO: 30.7 PG (ref 26.6–33)
MCHC RBC AUTO-ENTMCNC: 33.4 G/DL (ref 31.5–35.7)
MCV RBC AUTO: 91.9 FL (ref 79–97)
MONOCYTES # BLD AUTO: 0.83 10*3/MM3 (ref 0.1–0.9)
MONOCYTES NFR BLD AUTO: 9.8 % (ref 5–12)
NEUTROPHILS NFR BLD AUTO: 4.83 10*3/MM3 (ref 1.7–7)
NEUTROPHILS NFR BLD AUTO: 57 % (ref 42.7–76)
NRBC BLD AUTO-RTO: 0 /100 WBC (ref 0–0.2)
PLATELET # BLD AUTO: 398 10*3/MM3 (ref 140–450)
PMV BLD AUTO: 9.7 FL (ref 6–12)
POTASSIUM SERPL-SCNC: 4.4 MMOL/L (ref 3.5–5.2)
PROT SERPL-MCNC: 7 G/DL (ref 6–8.5)
PROTHROMBIN TIME: 13 SECONDS (ref 11.9–14.1)
RBC # BLD AUTO: 4.3 10*6/MM3 (ref 3.77–5.28)
SODIUM SERPL-SCNC: 136 MMOL/L (ref 136–145)
WBC # BLD AUTO: 8.48 10*3/MM3 (ref 3.4–10.8)

## 2020-08-03 PROCEDURE — 80053 COMPREHEN METABOLIC PANEL: CPT | Performed by: EMERGENCY MEDICINE

## 2020-08-03 PROCEDURE — 85610 PROTHROMBIN TIME: CPT | Performed by: EMERGENCY MEDICINE

## 2020-08-03 PROCEDURE — 99283 EMERGENCY DEPT VISIT LOW MDM: CPT

## 2020-08-03 PROCEDURE — 85025 COMPLETE CBC W/AUTO DIFF WBC: CPT | Performed by: EMERGENCY MEDICINE

## 2020-08-03 PROCEDURE — 85730 THROMBOPLASTIN TIME PARTIAL: CPT | Performed by: EMERGENCY MEDICINE

## 2020-08-03 RX ORDER — SODIUM CHLORIDE 0.9 % (FLUSH) 0.9 %
10 SYRINGE (ML) INJECTION AS NEEDED
Status: DISCONTINUED | OUTPATIENT
Start: 2020-08-03 | End: 2020-08-03 | Stop reason: HOSPADM

## 2020-08-03 NOTE — ED PROVIDER NOTES
"Subjective   75-year-old white female presents for rectal bleeding.  This nursing home patient was sent to the ER for rectal bleeding.  The patient denies having any rectal bleeding.  She does have a history of dementia, but does answer questions appropriately.  Her daughter accompanies her and says that she has a history of DVTs and is currently on Eliquis.  She has not seen any rectal bleeding, but does not really change the patient.  She denies any headache, lightheadedness, dizziness, chest pain, shortness of breath, abdominal pain, nausea, vomiting or other complaints.          Review of Systems   All other systems reviewed and are negative.      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)      &    • HTN (hypertension)    • Myelofibrosis (CMS/HCC)     Bone marrow   • Panic attacks    • Psychiatric illness        Allergies   Allergen Reactions   • Ciprofloxacin Unknown (See Comments)     \"I don't know.\"   • Lamotrigine Unknown (See Comments)     Pt unable to provide info   • Risperidone And Related Unknown (See Comments)     Pt unable to provide info     • Seroquel [Quetiapine Fumarate] Unknown (See Comments)     Pt unable to provide info     • Sulfa Antibiotics Unknown (See Comments)     Pt unable to provide info   • Zoloft [Sertraline Hcl] Unknown (See Comments)     Pt unable to provide info   • Penicillins Rash       Past Surgical History:   Procedure Laterality Date   • BONE MARROW BIOPSY     •  SECTION     • COLON RESECTION      due to tumor which turned out to be benign. pt unable to remember date.    • SAMAN FILTER INSERTION JUGULAR     • SKIN CANCER EXCISION     • SMALL INTESTINE SURGERY     • SPLENECTOMY     • SPLENECTOMY, PARTIAL      as part of the bone marrow transplant.  "   • TRANSURETHRAL RESECTION OF BLADDER TUMOR         Family History   Problem Relation Age of Onset   • Cancer Father    • Cancer Mother    • Depression Mother    • Anxiety disorder Mother        Social History     Socioeconomic History   • Marital status:      Spouse name: Not on file   • Number of children: Not on file   • Years of education: Not on file   • Highest education level: Not on file   Tobacco Use   • Smoking status: Former Smoker   • Smokeless tobacco: Never Used   • Tobacco comment: quit approximately 30 years ago   Substance and Sexual Activity   • Alcohol use: No   • Drug use: No   • Sexual activity: Never           Objective   Physical Exam   Constitutional: She appears well-developed and well-nourished.   HENT:   Head: Normocephalic and atraumatic.   Cardiovascular: Normal rate, regular rhythm and normal heart sounds. Exam reveals no gallop and no friction rub.   No murmur heard.  Pulmonary/Chest: Effort normal and breath sounds normal. No respiratory distress. She has no wheezes. She has no rales.   Abdominal: Soft. Bowel sounds are normal. She exhibits no distension. There is no tenderness.   Genitourinary: Rectum normal.   Genitourinary Comments: Rectal tone normal.  No masses noted.  Copious soft stool in rectal vault.  Brown stool with no gross blood.   Musculoskeletal: Normal range of motion.   Neurological: She is alert.   Skin: Skin is warm and dry.   Psychiatric: She has a normal mood and affect.   Nursing note and vitals reviewed.      Procedures  Results for orders placed or performed during the hospital encounter of 08/03/20   Comprehensive Metabolic Panel   Result Value Ref Range    Glucose 97 65 - 99 mg/dL    BUN 19 8 - 23 mg/dL    Creatinine 0.67 0.57 - 1.00 mg/dL    Sodium 136 136 - 145 mmol/L    Potassium 4.4 3.5 - 5.2 mmol/L    Chloride 99 98 - 107 mmol/L    CO2 26.8 22.0 - 29.0 mmol/L    Calcium 9.2 8.6 - 10.5 mg/dL    Total Protein 7.0 6.0 - 8.5 g/dL    Albumin 4.02 3.50  - 5.20 g/dL    ALT (SGPT) 12 1 - 33 U/L    AST (SGOT) 19 1 - 32 U/L    Alkaline Phosphatase 72 39 - 117 U/L    Total Bilirubin 0.2 0.0 - 1.2 mg/dL    eGFR Non African Amer 86 >60 mL/min/1.73    Globulin 3.0 gm/dL    A/G Ratio 1.3 g/dL    BUN/Creatinine Ratio 28.4 (H) 7.0 - 25.0    Anion Gap 10.2 5.0 - 15.0 mmol/L   Protime-INR   Result Value Ref Range    Protime 13.0 11.9 - 14.1 Seconds    INR 1.00 0.90 - 1.10   aPTT   Result Value Ref Range    PTT 31.9 25.6 - 35.3 seconds   CBC Auto Differential   Result Value Ref Range    WBC 8.48 3.40 - 10.80 10*3/mm3    RBC 4.30 3.77 - 5.28 10*6/mm3    Hemoglobin 13.2 12.0 - 15.9 g/dL    Hematocrit 39.5 34.0 - 46.6 %    MCV 91.9 79.0 - 97.0 fL    MCH 30.7 26.6 - 33.0 pg    MCHC 33.4 31.5 - 35.7 g/dL    RDW 13.8 12.3 - 15.4 %    RDW-SD 47.1 37.0 - 54.0 fl    MPV 9.7 6.0 - 12.0 fL    Platelets 398 140 - 450 10*3/mm3    Neutrophil % 57.0 42.7 - 76.0 %    Lymphocyte % 31.1 19.6 - 45.3 %    Monocyte % 9.8 5.0 - 12.0 %    Eosinophil % 1.3 0.3 - 6.2 %    Basophil % 0.6 0.0 - 1.5 %    Immature Grans % 0.2 0.0 - 0.5 %    Neutrophils, Absolute 4.83 1.70 - 7.00 10*3/mm3    Lymphocytes, Absolute 2.64 0.70 - 3.10 10*3/mm3    Monocytes, Absolute 0.83 0.10 - 0.90 10*3/mm3    Eosinophils, Absolute 0.11 0.00 - 0.40 10*3/mm3    Basophils, Absolute 0.05 0.00 - 0.20 10*3/mm3    Immature Grans, Absolute 0.02 0.00 - 0.05 10*3/mm3    nRBC 0.0 0.0 - 0.2 /100 WBC              ED Course  ED Course as of Aug 03 1442   Mon Aug 03, 2020   1432 Hemodynamically stable.  H&H normal and unchanged from baseline.  Daughter says that patient is going to have colonoscopy.  Will discharge back to nursing home to follow-up for that.    [BC]      ED Course User Index  [BC] Philip Cobb MD                                           MDM  Number of Diagnoses or Management Options  Bright red blood per rectum:      Amount and/or Complexity of Data Reviewed  Clinical lab tests: reviewed  Decide to obtain previous medical  records or to obtain history from someone other than the patient: yes        Final diagnoses:   Bright red blood per rectum            Philip Cobb MD  08/03/20 6028

## 2020-08-03 NOTE — ED NOTES
Report called to Urmila at [facility] at this time     Romina Enriquez, RN  08/03/20 8042

## 2020-08-20 ENCOUNTER — OFFICE VISIT (OUTPATIENT)
Dept: SURGERY | Facility: CLINIC | Age: 75
End: 2020-08-20

## 2020-08-20 VITALS — WEIGHT: 113 LBS | BODY MASS INDEX: 20.8 KG/M2 | HEIGHT: 62 IN

## 2020-08-20 DIAGNOSIS — K62.5 RECTAL BLEEDING: Primary | ICD-10-CM

## 2020-08-20 PROCEDURE — 99213 OFFICE O/P EST LOW 20 MIN: CPT | Performed by: SURGERY

## 2020-08-20 RX ORDER — DRONABINOL 5 MG/1
5 CAPSULE ORAL
COMMUNITY

## 2020-08-20 NOTE — PROGRESS NOTES
Subjective   Lacie Win is a 75 y.o. female.     Chief Complaint: rectal bleeding    History of Present Illness She had a diarrhea with some rectal bleeding a couple of months ago it stopped for a few weeks ago, but has started back the last week. It has some dark and some light blood. She feels ok now. A CT done a couple of months ago showed colon wall thickening. She had a colon resection for polyps several years ago. No recent colonoscopies. 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    Review of Systems   Constitutional: Negative for activity change, appetite change, chills, fever and unexpected weight change.   HENT: Negative for congestion, facial swelling and sore throat.    Eyes: Negative for photophobia and visual disturbance.   Respiratory: Negative for chest tightness, shortness of breath and wheezing.    Cardiovascular: Negative for chest pain, palpitations and leg swelling.   Gastrointestinal: Positive for blood in stool and diarrhea. Negative for abdominal distention, abdominal pain, anal bleeding, constipation, nausea, rectal pain and vomiting.   Endocrine: Negative for cold intolerance, heat intolerance, polydipsia and polyuria.   Genitourinary: Negative for difficulty urinating, dysuria, flank pain and urgency.   Musculoskeletal: Negative for back pain and myalgias.   Skin: Negative for rash and wound.   Allergic/Immunologic: Negative for immunocompromised state.   Neurological: Negative for dizziness, seizures, syncope, light-headedness, numbness and headaches.   Hematological: Negative for adenopathy. Does not bruise/bleed easily.   Psychiatric/Behavioral: Negative for behavioral problems and confusion. The patient is not nervous/anxious.        Objective   Physical Exam    Constitutional: She is oriented to person, place, and time. She appears well-developed and well-nourished. She does not appear ill. No distress.   HENT:   Head: Normocephalic. Head is without laceration. Hair is normal.   Right Ear: Hearing and ear canal normal.   Left Ear: Hearing and ear canal normal.   Nose: Nose normal. No sinus tenderness. No epistaxis. Right sinus exhibits no maxillary sinus tenderness and no frontal sinus tenderness. Left sinus exhibits no maxillary sinus tenderness and no frontal sinus tenderness.   Eyes: Pupils are equal, round, and reactive to light. Conjunctivae and lids are normal.   Neck: Normal range of motion. No JVD present. No tracheal tenderness present. No tracheal deviation present. No thyroid mass and no thyromegaly present.   Cardiovascular: Normal rate and regular rhythm. Exam reveals no gallop.   No murmur heard.  Pulmonary/Chest: Effort normal and breath sounds normal. No stridor. She has no wheezes. She exhibits no tenderness.   Abdominal: Soft. Bowel sounds are normal. She exhibits no distension, no ascites and no mass. There is no tenderness. There is no rebound and no guarding. No hernia.   Musculoskeletal: She exhibits no edema or deformity.   Lymphadenopathy:     She has no cervical adenopathy.     She has no axillary adenopathy.        Right: No inguinal and no supraclavicular adenopathy present.        Left: No inguinal and no supraclavicular adenopathy present.   Neurological: She is alert and oriented to person, place, and time. She exhibits normal muscle tone.   Skin: Skin is warm, dry and intact. No rash noted. No erythema. No pallor.   Psychiatric: She has a normal mood and affect. Her behavior is normal. Thought content normal.   Vitals reviewed.      Assessment/Plan   Lacie was seen today for colonoscopy.    Diagnoses and all orders for this visit:    Rectal bleeding      colonoscopy

## 2020-08-26 ENCOUNTER — TELEPHONE (OUTPATIENT)
Dept: SURGERY | Facility: CLINIC | Age: 75
End: 2020-08-26

## 2020-08-26 NOTE — TELEPHONE ENCOUNTER
Spoke to [facility] nurse.  DC blood thinners Friday 8/28. Clear liquid diet all day Wed. 9/2 with bowel prep that evening. NPO after midnight. Covid swab 9/1 Tuesday.

## 2020-09-01 ENCOUNTER — LAB REQUISITION (OUTPATIENT)
Dept: LAB | Facility: HOSPITAL | Age: 75
End: 2020-09-01

## 2020-09-01 DIAGNOSIS — U07.1 COVID-19: ICD-10-CM

## 2020-09-01 PROCEDURE — U0004 COV-19 TEST NON-CDC HGH THRU: HCPCS | Performed by: INTERNAL MEDICINE

## 2020-09-01 PROCEDURE — U0002 COVID-19 LAB TEST NON-CDC: HCPCS | Performed by: INTERNAL MEDICINE

## 2020-09-02 LAB
REF LAB TEST METHOD: ABNORMAL
SARS-COV-2 RNA RESP QL NAA+PROBE: DETECTED

## 2020-09-03 ENCOUNTER — LAB REQUISITION (OUTPATIENT)
Dept: LAB | Facility: HOSPITAL | Age: 75
End: 2020-09-03

## 2020-09-03 DIAGNOSIS — R05.9 COUGH: ICD-10-CM

## 2020-09-03 PROCEDURE — U0004 COV-19 TEST NON-CDC HGH THRU: HCPCS | Performed by: FAMILY MEDICINE

## 2020-09-04 LAB — SARS-COV-2 RNA NOSE QL NAA+PROBE: NOT DETECTED

## 2020-09-25 ENCOUNTER — LAB REQUISITION (OUTPATIENT)
Dept: LAB | Facility: HOSPITAL | Age: 75
End: 2020-09-25

## 2020-09-25 DIAGNOSIS — Z20.828 CONTACT WITH AND (SUSPECTED) EXPOSURE TO OTHER VIRAL COMMUNICABLE DISEASES: ICD-10-CM

## 2020-09-25 PROCEDURE — 86769 SARS-COV-2 COVID-19 ANTIBODY: CPT | Performed by: FAMILY MEDICINE

## 2020-09-26 LAB
SARS-COV-2 IGG SERPL QL IA: NEGATIVE
SARS-COV-2 IGG SERPL QL IA: NORMAL
SARS-COV-2 IGM SERPL QL IA: NEGATIVE

## 2020-09-27 LAB — SARS-COV-2 IGA SERPL QL IA: NEGATIVE

## 2020-11-12 ENCOUNTER — OFFICE VISIT (OUTPATIENT)
Dept: SURGERY | Facility: CLINIC | Age: 75
End: 2020-11-12

## 2020-11-12 VITALS — WEIGHT: 113 LBS | BODY MASS INDEX: 20.8 KG/M2 | HEIGHT: 62 IN

## 2020-11-12 DIAGNOSIS — K62.5 RECTAL BLEEDING: Primary | ICD-10-CM

## 2020-11-12 PROCEDURE — 99213 OFFICE O/P EST LOW 20 MIN: CPT | Performed by: SURGERY

## 2020-11-12 RX ORDER — SODIUM, POTASSIUM,MAG SULFATES 17.5-3.13G
SOLUTION, RECONSTITUTED, ORAL ORAL
Qty: 2 BOTTLE | Refills: 0 | Status: SHIPPED | OUTPATIENT
Start: 2020-11-12

## 2020-11-12 NOTE — PROGRESS NOTES
Subjective   Lacie Win is a 75 y.o. female.     Chief Complaint:     History of Present Illness She had some episodes of diarrhea earlier this year and had a colonoscopy scheduled a couple of months ago that had to be postponed due to COVID. She is doing ok now and needs the colonoscopy rescheduled. No bleeding or diarrhea recently. She did  Have a CT showing a thickened colon earlier this year.    The following portions of the patient's history were reviewed and updated as appropriate: current medications, past family history, past medical history, past social history, past surgical history and problem list.    Review of Systems   Constitutional: Negative for activity change, appetite change, chills, fever and unexpected weight change.   HENT: Negative for congestion, facial swelling and sore throat.    Eyes: Negative for photophobia and visual disturbance.   Respiratory: Negative for chest tightness, shortness of breath and wheezing.    Cardiovascular: Negative for chest pain, palpitations and leg swelling.   Gastrointestinal: Negative for abdominal distention, abdominal pain, anal bleeding, blood in stool, constipation, diarrhea, nausea, rectal pain and vomiting.   Endocrine: Negative for cold intolerance, heat intolerance, polydipsia and polyuria.   Genitourinary: Negative for difficulty urinating, dysuria, flank pain and urgency.   Musculoskeletal: Negative for back pain and myalgias.   Skin: Negative for rash and wound.   Allergic/Immunologic: Negative for immunocompromised state.   Neurological: Negative for dizziness, seizures, syncope, light-headedness, numbness and headaches.   Hematological: Negative for adenopathy. Does not bruise/bleed easily.   Psychiatric/Behavioral: Negative for behavioral problems and confusion. The patient is not nervous/anxious.        Objective   Physical Exam  Vitals signs reviewed.   Constitutional:       General: She is not in acute distress.     Appearance: She is  well-developed. She is not ill-appearing.   HENT:      Head: Normocephalic. No laceration. Hair is normal.      Right Ear: Hearing and ear canal normal.      Left Ear: Hearing and ear canal normal.      Nose: Nose normal.      Right Sinus: No maxillary sinus tenderness or frontal sinus tenderness.      Left Sinus: No maxillary sinus tenderness or frontal sinus tenderness.   Eyes:      General: Lids are normal.      Conjunctiva/sclera: Conjunctivae normal.      Pupils: Pupils are equal, round, and reactive to light.   Neck:      Musculoskeletal: Normal range of motion.      Thyroid: No thyroid mass or thyromegaly.      Vascular: No JVD.      Trachea: No tracheal tenderness or tracheal deviation.   Cardiovascular:      Rate and Rhythm: Normal rate and regular rhythm.      Heart sounds: No murmur. No gallop.    Pulmonary:      Effort: Pulmonary effort is normal.      Breath sounds: Normal breath sounds. No stridor. No wheezing.   Chest:      Chest wall: No tenderness.   Abdominal:      General: Bowel sounds are normal. There is no distension.      Palpations: Abdomen is soft. There is no mass.      Tenderness: There is no abdominal tenderness. There is no guarding or rebound.      Hernia: No hernia is present.   Musculoskeletal:         General: No deformity.   Lymphadenopathy:      Cervical: No cervical adenopathy.      Upper Body:      Right upper body: No supraclavicular adenopathy.      Left upper body: No supraclavicular adenopathy.   Skin:     General: Skin is warm and dry.      Coloration: Skin is not pale.      Findings: No erythema or rash.   Neurological:      Mental Status: She is alert and oriented to person, place, and time.      Motor: No abnormal muscle tone.   Psychiatric:         Behavior: Behavior normal.         Thought Content: Thought content normal.         Assessment/Plan   Diagnoses and all orders for this visit:    1. Rectal bleeding (Primary)    colonoscopy

## 2020-11-16 ENCOUNTER — TELEPHONE (OUTPATIENT)
Dept: SURGERY | Facility: CLINIC | Age: 75
End: 2020-11-16

## 2020-11-16 NOTE — TELEPHONE ENCOUNTER
Called nursing home with colonoscopy details. Written order faxed to [facility] [phone] attention unit 100.

## 2020-11-17 ENCOUNTER — LAB REQUISITION (OUTPATIENT)
Dept: LAB | Facility: HOSPITAL | Age: 75
End: 2020-11-17

## 2020-11-17 DIAGNOSIS — Z20.828 CONTACT WITH AND (SUSPECTED) EXPOSURE TO OTHER VIRAL COMMUNICABLE DISEASES: ICD-10-CM

## 2020-11-17 PROCEDURE — U0004 COV-19 TEST NON-CDC HGH THRU: HCPCS | Performed by: NURSE PRACTITIONER

## 2020-11-18 LAB — SARS-COV-2 RNA RESP QL NAA+PROBE: NOT DETECTED

## 2021-03-29 ENCOUNTER — LAB REQUISITION (OUTPATIENT)
Dept: LAB | Facility: HOSPITAL | Age: 76
End: 2021-03-29

## 2021-03-29 DIAGNOSIS — R53.83 OTHER FATIGUE: ICD-10-CM

## 2021-03-29 LAB
BACTERIA UR QL AUTO: NORMAL /HPF
BILIRUB UR QL STRIP: NEGATIVE
CLARITY UR: CLEAR
COLOR UR: ABNORMAL
GLUCOSE UR STRIP-MCNC: NEGATIVE MG/DL
HGB UR QL STRIP.AUTO: NEGATIVE
HYALINE CASTS UR QL AUTO: NORMAL /LPF
KETONES UR QL STRIP: ABNORMAL
LEUKOCYTE ESTERASE UR QL STRIP.AUTO: NEGATIVE
NITRITE UR QL STRIP: NEGATIVE
PH UR STRIP.AUTO: 5.5 [PH] (ref 5–8)
PROT UR QL STRIP: NEGATIVE
RBC # UR: NORMAL /HPF
REF LAB TEST METHOD: NORMAL
SP GR UR STRIP: 1.02 (ref 1–1.03)
SQUAMOUS #/AREA URNS HPF: NORMAL /HPF
UROBILINOGEN UR QL STRIP: ABNORMAL
WBC UR QL AUTO: NORMAL /HPF

## 2021-03-29 PROCEDURE — 87086 URINE CULTURE/COLONY COUNT: CPT | Performed by: INTERNAL MEDICINE

## 2021-03-29 PROCEDURE — 81001 URINALYSIS AUTO W/SCOPE: CPT | Performed by: INTERNAL MEDICINE

## 2021-03-30 LAB — BACTERIA SPEC AEROBE CULT: NO GROWTH

## 2021-04-09 ENCOUNTER — APPOINTMENT (OUTPATIENT)
Dept: CT IMAGING | Facility: HOSPITAL | Age: 76
End: 2021-04-09

## 2021-04-09 ENCOUNTER — HOSPITAL ENCOUNTER (EMERGENCY)
Facility: HOSPITAL | Age: 76
Discharge: NURSING, REHAB OR LONG-TERM CARE | End: 2021-04-09
Attending: EMERGENCY MEDICINE | Admitting: EMERGENCY MEDICINE

## 2021-04-09 VITALS
BODY MASS INDEX: 20.77 KG/M2 | SYSTOLIC BLOOD PRESSURE: 140 MMHG | OXYGEN SATURATION: 95 % | DIASTOLIC BLOOD PRESSURE: 98 MMHG | WEIGHT: 110 LBS | RESPIRATION RATE: 16 BRPM | HEART RATE: 77 BPM | HEIGHT: 61 IN | TEMPERATURE: 97 F

## 2021-04-09 DIAGNOSIS — S09.90XA CLOSED HEAD INJURY, INITIAL ENCOUNTER: Primary | ICD-10-CM

## 2021-04-09 DIAGNOSIS — S01.81XA FACIAL LACERATION, INITIAL ENCOUNTER: ICD-10-CM

## 2021-04-09 PROCEDURE — 70450 CT HEAD/BRAIN W/O DYE: CPT

## 2021-04-09 PROCEDURE — 99283 EMERGENCY DEPT VISIT LOW MDM: CPT

## 2021-04-09 PROCEDURE — 72125 CT NECK SPINE W/O DYE: CPT

## 2021-04-09 RX ADMIN — Medication 3 ML: at 06:45

## 2021-04-09 NOTE — ED NOTES
Consent to treat obtained at this time from the pt's POA, Mary Ellen Fallon. POA requests to be given a call with any status changes or when the pt returns to the nursing home. [phone]     Karli Vidal, DEMI  04/09/21 0654

## 2021-04-09 NOTE — DISCHARGE INSTRUCTIONS
Read all instructions in this handout.   Call your primary care physician for a follow up appointment in 1-2 days.   Return to the emergency department as soon as possible for worsening of your symptoms or for any other concerns that you may have.

## 2021-04-09 NOTE — ED NOTES
Dr. Pickering at bedside to do laceration repair at this time. 7 sutures placed on forehead laceration. No active bleeding noted. Pt tolerated well. NADN. Will continue to monitor.      Karli Vidal RN  04/09/21 0651

## 2021-04-09 NOTE — ED PROVIDER NOTES
Subjective     History provided by:  Patient   used: No    Fall  Mechanism of injury: fall    Injury location:  Head/neck  Head/neck injury location:  Head  Incident location:  Nursing home  Arrived directly from scene: yes    Fall:     Fall occurred:  Standing    Impact surface:  Hard floor    Point of impact:  Head    Entrapped after fall: no    Protective equipment: none    Suspicion of alcohol use: no    Suspicion of drug use: no    Tetanus status:  Up to date  Prior to arrival data:     Bystander interventions:  None    Patient ambulatory at scene: yes      Blood loss:  None    Responsiveness at scene:  Alert    Orientation at scene:  Person, place, situation and time    Loss of consciousness: no      Amnesic to event: no      Airway interventions:  None    Breathing interventions:  None    IV access status:  None    IO access:  None    Fluids administered:  None    Cardiac interventions:  None    Medications administered:  None    Immobilization:  None    Airway condition since incident:  Stable    Breathing condition since incident:  Stable    Circulation condition since incident:  Stable    Mental status condition since incident:  Stable    Disability condition since incident:  Stable  Associated symptoms: no abdominal pain, no back pain, no blindness, no chest pain, no difficulty breathing, no headaches, no hearing loss, no loss of consciousness, no nausea, no neck pain, no seizures and no vomiting    Risk factors: no AICD, no anticoagulation therapy, no asthma, no beta blocker therapy, no CABG, no CAD, no CHF, no COPD, no diabetes, no dialysis, no hemophilia, no kidney disease, no pacemaker, no past MI, not pregnant and no steroid use        Review of Systems   Constitutional: Negative for activity change, appetite change, chills, diaphoresis, fatigue and fever.   HENT: Negative for congestion, ear pain, hearing loss and sore throat.    Eyes: Negative for blindness and redness.    "  Respiratory: Negative for cough, chest tightness, shortness of breath and wheezing.    Cardiovascular: Negative for chest pain, palpitations and leg swelling.   Gastrointestinal: Negative for abdominal pain, diarrhea, nausea and vomiting.   Genitourinary: Negative for dysuria and urgency.   Musculoskeletal: Negative for arthralgias, back pain, myalgias and neck pain.   Skin: Negative for pallor, rash and wound.   Neurological: Negative for dizziness, seizures, loss of consciousness, speech difficulty, weakness and headaches.   Psychiatric/Behavioral: Negative for agitation, behavioral problems, confusion and decreased concentration.   All other systems reviewed and are negative.      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[facility])      &    • HTN (hypertension)    • Myelofibrosis (CMS/Formerly [facility])     Bone marrow   • Panic attacks    • Psychiatric illness        Allergies   Allergen Reactions   • Ciprofloxacin Unknown (See Comments)     \"I don't know.\"   • Lamotrigine Unknown (See Comments)     Pt unable to provide info   • Risperidone And Related Unknown (See Comments)     Pt unable to provide info     • Seroquel [Quetiapine Fumarate] Unknown (See Comments)     Pt unable to provide info     • Sulfa Antibiotics Unknown (See Comments)     Pt unable to provide info   • Zoloft [Sertraline Hcl] Unknown (See Comments)     Pt unable to provide info   • Penicillins Rash       Past Surgical History:   Procedure Laterality Date   • BONE MARROW BIOPSY     •  SECTION     • COLON RESECTION      due to tumor which turned out to be benign. pt unable to remember date.    • SAMAN FILTER INSERTION JUGULAR     • SKIN CANCER EXCISION     • SMALL INTESTINE SURGERY     • SPLENECTOMY     • SPLENECTOMY, PARTIAL      as " part of the bone marrow transplant.    • TRANSURETHRAL RESECTION OF BLADDER TUMOR         Family History   Problem Relation Age of Onset   • Cancer Father    • Cancer Mother    • Depression Mother    • Anxiety disorder Mother        Social History     Socioeconomic History   • Marital status:      Spouse name: Not on file   • Number of children: Not on file   • Years of education: Not on file   • Highest education level: Not on file   Tobacco Use   • Smoking status: Former Smoker   • Smokeless tobacco: Never Used   • Tobacco comment: quit approximately 30 years ago   Substance and Sexual Activity   • Alcohol use: No   • Drug use: No   • Sexual activity: Never           Objective   Physical Exam  Vitals and nursing note reviewed.   Constitutional:       General: She is not in acute distress.     Appearance: Normal appearance. She is well-developed. She is not toxic-appearing or diaphoretic.   HENT:      Head: Normocephalic.        Right Ear: External ear normal.      Left Ear: External ear normal.      Nose: Nose normal.      Mouth/Throat:      Pharynx: No oropharyngeal exudate.      Tonsils: No tonsillar exudate.   Eyes:      General: Lids are normal.      Conjunctiva/sclera: Conjunctivae normal.      Pupils: Pupils are equal, round, and reactive to light.   Neck:      Thyroid: No thyromegaly.   Cardiovascular:      Rate and Rhythm: Normal rate and regular rhythm.      Pulses: Normal pulses.      Heart sounds: Normal heart sounds, S1 normal and S2 normal.   Pulmonary:      Effort: Pulmonary effort is normal. No tachypnea or respiratory distress.      Breath sounds: Normal breath sounds. No decreased breath sounds, wheezing or rales.   Chest:      Chest wall: No tenderness.   Abdominal:      General: Bowel sounds are normal. There is no distension.      Palpations: Abdomen is soft.      Tenderness: There is no abdominal tenderness. There is no guarding or rebound.   Musculoskeletal:         General: No  tenderness or deformity. Normal range of motion.      Cervical back: Full passive range of motion without pain, normal range of motion and neck supple.   Lymphadenopathy:      Cervical: No cervical adenopathy.   Skin:     General: Skin is warm and dry.      Coloration: Skin is not pale.      Findings: No erythema or rash.   Neurological:      Mental Status: She is alert and oriented to person, place, and time.      GCS: GCS eye subscore is 4. GCS verbal subscore is 5. GCS motor subscore is 6.      Cranial Nerves: No cranial nerve deficit.      Sensory: No sensory deficit.   Psychiatric:         Speech: Speech normal.         Behavior: Behavior normal.         Thought Content: Thought content normal.         Judgment: Judgment normal.         Laceration Repair    Date/Time: 4/9/2021 6:46 AM  Performed by: Mendez Pickering MD  Authorized by: Mendze Pickering MD     Consent:     Consent obtained:  Verbal and written    Consent given by:  Patient    Risks discussed:  Infection, pain, poor cosmetic result, poor wound healing, retained foreign body, tendon damage, vascular damage, nerve damage and need for additional repair    Alternatives discussed:  No treatment  Universal protocol:     Procedure explained and questions answered to patient or proxy's satisfaction: yes      Relevant documents present and verified: yes      Test results available and properly labeled: yes      Imaging studies available: yes      Required blood products, implants, devices, and special equipment available: yes      Site/side marked: yes      Immediately prior to procedure, a time out was called: yes      Patient identity confirmed:  Verbally with patient and arm band  Anesthesia (see MAR for exact dosages):     Anesthesia method:  Topical application    Topical anesthetic:  LET  Laceration details:     Location:  Face    Face location:  Forehead    Length (cm):  3  Repair type:     Repair type:  Simple  Pre-procedure  details:     Preparation:  Patient was prepped and draped in usual sterile fashion  Exploration:     Hemostasis achieved with:  Direct pressure    Contaminated: no    Treatment:     Area cleansed with:  Hibiclens    Amount of cleaning:  Standard  Skin repair:     Repair method:  Sutures    Suture size:  4-0    Suture material:  Nylon    Suture technique:  Simple interrupted    Number of sutures:  7  Approximation:     Approximation:  Close  Post-procedure details:     Dressing:  Antibiotic ointment    Patient tolerance of procedure:  Tolerated well, no immediate complications               ED Course  ED Course as of Apr 09 0825 Fri Apr 09, 2021   0819 CT head shows IMPRESSION:     1. No acute intracranial abnormality. Stable senescent changes.  2. Mild left frontal scalp soft tissue swelling with evidence of small laceration. No underlying osseous injury.       CT cervical spine shows multilevel degenerative changes    [JG]   0822 Patient resting comfortably upon reevaluation.  She will follow-up with her primary care physician for reevaluation and suture removal.  Strict return precautions given.  She is well appearing at time of discharge and agreeable with plan of care.    [JG]      ED Course User Index  [JG] Imer Sorto,                                            MDM  Number of Diagnoses or Management Options     Amount and/or Complexity of Data Reviewed  Tests in the radiology section of CPT®: reviewed and ordered  Tests in the medicine section of CPT®: ordered and reviewed  Review and summarize past medical records: yes  Independent visualization of images, tracings, or specimens: yes    Risk of Complications, Morbidity, and/or Mortality  Presenting problems: moderate  Diagnostic procedures: moderate  Management options: moderate    Patient Progress  Patient progress: stable      Final diagnoses:   Closed head injury, initial encounter   Facial laceration, initial encounter       ED Disposition  ED  Disposition     ED Disposition Condition Comment    Discharge Stable           Genie Suarez MD  [address] [place] [address]  [phone]    Schedule an appointment as soon as possible for a visit in 4 days           Medication List      No changes were made to your prescriptions during this visit.          Imer Sorto DO  04/09/21 0832

## 2021-04-09 NOTE — ED NOTES
Pt daughter Elysia at this time, wanting to transport Pt back to NH, Dr. Sorto okay with Pt being taken back by POV, [facility] states that Pt can come back by POV and made aware.      Romina Petersen RN  04/09/21 1120

## 2021-04-09 NOTE — ED NOTES
Report called to DEMI May at [facility], verbalized understanding that Pt will need sutures removed within 4-5 days per Dr. Sorto's VORB.      Romina Petersen RN  04/09/21 0882

## 2021-04-09 NOTE — ED NOTES
Report received from DEMI Calvillo. Pt resting on stretcher with eyes closed appearing to be asleep. Pt RR even and unlabored, skin WPD. Laceration repair noted to have bleeding controlled. Pt awaiting CT. Safety measures remain WDL. NADN. TM.      Romina Petersen RN  04/09/21 0734

## 2021-04-09 NOTE — ED NOTES
Pt left the ED with laceration repair site clean, dry, intact with bleeding controlled.      Romina Petersen, RN  04/09/21 1930

## 2021-04-09 NOTE — ED NOTES
Called [facility] to transport pt back to [facility].      Nahun Simons  04/09/21 0830

## 2021-04-09 NOTE — ED NOTES
Pt brief noted to be soiled, Pt cleaned, clean brief applied. Pt tolerated well.      Romina Petersen, RN  04/09/21 6645

## 2021-04-11 NOTE — PHARMACY PATIENT ASSISTANCE
Pharmacy checked on pricing and coverage for eliquis.  According to [facility] pharmacy, he does not have any copay.    Thank You;  Mary Ellen Toure, [facility]  05/01/18  9:25 AM     no

## 2021-08-10 ENCOUNTER — OFFICE VISIT (OUTPATIENT)
Dept: SURGERY | Facility: CLINIC | Age: 76
End: 2021-08-10

## 2021-08-10 VITALS
DIASTOLIC BLOOD PRESSURE: 86 MMHG | HEIGHT: 61 IN | BODY MASS INDEX: 21.34 KG/M2 | WEIGHT: 113 LBS | HEART RATE: 88 BPM | SYSTOLIC BLOOD PRESSURE: 104 MMHG

## 2021-08-10 DIAGNOSIS — R10.13 EPIGASTRIC PAIN: ICD-10-CM

## 2021-08-10 DIAGNOSIS — K80.10 CALCULUS OF GALLBLADDER WITH CHRONIC CHOLECYSTITIS WITHOUT OBSTRUCTION: Primary | ICD-10-CM

## 2021-08-10 DIAGNOSIS — K63.9 COLON WALL THICKENING: ICD-10-CM

## 2021-08-10 PROCEDURE — 99214 OFFICE O/P EST MOD 30 MIN: CPT | Performed by: SURGERY

## 2021-08-10 RX ORDER — POLYETHYLENE GLYCOL 3350, SODIUM SULFATE ANHYDROUS, SODIUM BICARBONATE, SODIUM CHLORIDE, POTASSIUM CHLORIDE 236; 22.74; 6.74; 5.86; 2.97 G/4L; G/4L; G/4L; G/4L; G/4L
4 POWDER, FOR SOLUTION ORAL ONCE
Qty: 1 ML | Refills: 0 | Status: SHIPPED | OUTPATIENT
Start: 2021-08-10 | End: 2021-08-10

## 2021-08-10 NOTE — PROGRESS NOTES
Subjective   Lacie Win is a 76 y.o. female.     Chief Complaint: epigastric pain    History of Present Illness She was to have had a colonoscopy last year but it was cancelled due to COVID. She had thickening in the colon on a CT and has had a colon resection for polyps. She also has had some dysphagia in the past and has not had a EGD. She has had gallstones for a long time but has had increasing problems with epigastric pain after eating lately.    The following portions of the patient's history were reviewed and updated as appropriate: current medications, past family history, past medical history, past social history, past surgical history and problem list.    Review of Systems   Constitutional: Negative for activity change, appetite change, chills, fever and unexpected weight change.   HENT: Positive for trouble swallowing. Negative for congestion, facial swelling and sore throat.    Eyes: Negative for photophobia and visual disturbance.   Respiratory: Negative for chest tightness, shortness of breath and wheezing.    Cardiovascular: Negative for chest pain, palpitations and leg swelling.   Gastrointestinal: Positive for abdominal pain and diarrhea. Negative for abdominal distention, anal bleeding, blood in stool, constipation, nausea, rectal pain and vomiting.   Endocrine: Negative for cold intolerance, heat intolerance, polydipsia and polyuria.   Genitourinary: Negative for difficulty urinating, dysuria, flank pain and urgency.   Musculoskeletal: Negative for back pain and myalgias.   Skin: Negative for rash and wound.   Allergic/Immunologic: Negative for immunocompromised state.   Neurological: Negative for dizziness, seizures, syncope, light-headedness, numbness and headaches.   Hematological: Negative for adenopathy. Does not bruise/bleed easily.   Psychiatric/Behavioral: Negative for behavioral problems and confusion. The patient is not nervous/anxious.        Objective   Physical Exam  Vitals  reviewed.   Constitutional:       General: She is not in acute distress.     Appearance: She is well-developed. She is not ill-appearing.   HENT:      Head: Normocephalic. No laceration. Hair is normal.      Right Ear: Hearing and ear canal normal.      Left Ear: Hearing and ear canal normal.      Nose: Nose normal.      Right Sinus: No maxillary sinus tenderness or frontal sinus tenderness.      Left Sinus: No maxillary sinus tenderness or frontal sinus tenderness.   Eyes:      General: Lids are normal.      Conjunctiva/sclera: Conjunctivae normal.      Pupils: Pupils are equal, round, and reactive to light.   Neck:      Thyroid: No thyroid mass or thyromegaly.      Vascular: No JVD.      Trachea: No tracheal tenderness or tracheal deviation.   Cardiovascular:      Rate and Rhythm: Normal rate and regular rhythm.      Heart sounds: No murmur heard.   No gallop.    Pulmonary:      Effort: Pulmonary effort is normal.      Breath sounds: Normal breath sounds. No stridor. No wheezing.   Chest:      Chest wall: No tenderness.   Abdominal:      General: Bowel sounds are normal. There is no distension.      Palpations: Abdomen is soft. There is no mass.      Tenderness: There is no abdominal tenderness. There is no guarding or rebound.      Hernia: No hernia is present.   Musculoskeletal:         General: No deformity.      Cervical back: Normal range of motion.   Lymphadenopathy:      Cervical: No cervical adenopathy.      Upper Body:      Right upper body: No supraclavicular adenopathy.      Left upper body: No supraclavicular adenopathy.   Skin:     General: Skin is warm and dry.      Coloration: Skin is not pale.      Findings: No erythema or rash.   Neurological:      Mental Status: She is alert and oriented to person, place, and time.      Motor: No abnormal muscle tone.   Psychiatric:         Behavior: Behavior normal.         Thought Content: Thought content normal.         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)      &    • History of blood clots    • HTN (hypertension)    • Myelofibrosis (CMS/HCC)     Bone marrow   • Panic attacks    • Psychiatric illness        Family History   Problem Relation Age of Onset   • Cancer Father    • Cancer Mother    • Depression Mother    • Anxiety disorder Mother        Social History     Tobacco Use   • Smoking status: Former Smoker   • Smokeless tobacco: Never Used   • Tobacco comment: quit approximately 30 years ago   Vaping Use   • Vaping Use: Never used   Substance Use Topics   • Alcohol use: No   • Drug use: No       Past Surgical History:   Procedure Laterality Date   • BONE MARROW BIOPSY     •  SECTION     • COLON RESECTION      due to tumor which turned out to be benign. pt unable to remember date.    • SAMAN FILTER INSERTION JUGULAR     • SKIN CANCER EXCISION     • SMALL INTESTINE SURGERY     • SPLENECTOMY     • SPLENECTOMY, PARTIAL      as part of the bone marrow transplant.    • TRANSURETHRAL RESECTION OF BLADDER TUMOR         Current Outpatient Medications   Medication Instructions   • acetaminophen (TYLENOL) 500 mg, Oral, Every 6 Hours PRN   • amLODIPine (NORVASC) 5 mg, Oral, Daily   • apixaban (ELIQUIS) 5 mg, Oral, Every 12 Hours Scheduled   • bisacodyl (DULCOLAX) 10 mg, Oral, Daily PRN   • Brexpiprazole (Rexulti) 0.5 MG tablet Oral   • docusate sodium (COLACE) 250 mg, Oral, Daily   • dronabinol (MARINOL) 5 mg, Oral, 2 Times Daily Before Meals   • escitalopram (LEXAPRO) 5 mg, Oral, Daily   • hydrOXYzine (ATARAX) 25 mg, Oral, 3 Times Daily PRN   • LORazepam (ATIVAN) 1 MG tablet Take 1/2 tablet in the AM1/2 in the afternoon1 whole tablet at HS   • Multiple Vitamins-Minerals (MULTIVITAMIN WITH MINERALS) tablet tablet 1 tablet, Oral, Daily   •  nortriptyline (PAMELOR) 10 MG capsule Take 1 capsule at 2 PM   • nortriptyline (PAMELOR) 25 mg, Oral, Nightly   • polyethyl glycol-propyl glycol (SYSTANE) 0.4-0.3 % solution ophthalmic solution 2 drops, Every 1 Hour PRN   • polyethylene glycol (MIRALAX) packet 17 g, Oral, Daily   • Suprep Bowel Prep Kit 17.5-3.13-1.6 GM/177ML solution oral solution Dispense one Kit        Sig: Used as Directed         Assessment/Plan   Diagnoses and all orders for this visit:    1. Calculus of gallbladder with chronic cholecystitis without obstruction (Primary)    2. Epigastric pain    3. Colon wall thickening    Other orders  -     SCANNED - IMAGING    lap benito with EGD and colonoscopy

## 2021-08-11 DIAGNOSIS — Z20.822 ENCOUNTER FOR PREPROCEDURE SCREENING LABORATORY TESTING FOR COVID-19: Primary | ICD-10-CM

## 2021-08-11 DIAGNOSIS — Z01.812 ENCOUNTER FOR PREPROCEDURE SCREENING LABORATORY TESTING FOR COVID-19: Primary | ICD-10-CM

## 2021-08-13 ENCOUNTER — PRE-ADMISSION TESTING (OUTPATIENT)
Dept: PREADMISSION TESTING | Facility: HOSPITAL | Age: 76
End: 2021-08-13

## 2021-08-13 ENCOUNTER — LAB (OUTPATIENT)
Dept: LAB | Facility: HOSPITAL | Age: 76
End: 2021-08-13

## 2021-08-13 DIAGNOSIS — Z01.812 ENCOUNTER FOR PREPROCEDURE SCREENING LABORATORY TESTING FOR COVID-19: ICD-10-CM

## 2021-08-13 DIAGNOSIS — Z20.822 ENCOUNTER FOR PREPROCEDURE SCREENING LABORATORY TESTING FOR COVID-19: ICD-10-CM

## 2021-08-13 LAB
ANION GAP SERPL CALCULATED.3IONS-SCNC: 8.8 MMOL/L (ref 5–15)
BUN SERPL-MCNC: 15 MG/DL (ref 8–23)
BUN/CREAT SERPL: 20.5 (ref 7–25)
CALCIUM SPEC-SCNC: 9.5 MG/DL (ref 8.6–10.5)
CHLORIDE SERPL-SCNC: 103 MMOL/L (ref 98–107)
CO2 SERPL-SCNC: 26.2 MMOL/L (ref 22–29)
CREAT SERPL-MCNC: 0.73 MG/DL (ref 0.57–1)
DEPRECATED RDW RBC AUTO: 51.9 FL (ref 37–54)
ERYTHROCYTE [DISTWIDTH] IN BLOOD BY AUTOMATED COUNT: 15.2 % (ref 12.3–15.4)
GFR SERPL CREATININE-BSD FRML MDRD: 77 ML/MIN/1.73
GLUCOSE SERPL-MCNC: 110 MG/DL (ref 65–99)
HCT VFR BLD AUTO: 39.2 % (ref 34–46.6)
HGB BLD-MCNC: 13.1 G/DL (ref 12–15.9)
MCH RBC QN AUTO: 30.9 PG (ref 26.6–33)
MCHC RBC AUTO-ENTMCNC: 33.4 G/DL (ref 31.5–35.7)
MCV RBC AUTO: 92.5 FL (ref 79–97)
PLATELET # BLD AUTO: 526 10*3/MM3 (ref 140–450)
PMV BLD AUTO: 10.2 FL (ref 6–12)
POTASSIUM SERPL-SCNC: 4 MMOL/L (ref 3.5–5.2)
QT INTERVAL: 392 MS
QTC INTERVAL: 437 MS
RBC # BLD AUTO: 4.24 10*6/MM3 (ref 3.77–5.28)
SARS-COV-2 RNA PNL SPEC NAA+PROBE: NOT DETECTED
SODIUM SERPL-SCNC: 138 MMOL/L (ref 136–145)
WBC # BLD AUTO: 7.2 10*3/MM3 (ref 3.4–10.8)

## 2021-08-13 PROCEDURE — 85027 COMPLETE CBC AUTOMATED: CPT

## 2021-08-13 PROCEDURE — 93010 ELECTROCARDIOGRAM REPORT: CPT | Performed by: INTERNAL MEDICINE

## 2021-08-13 PROCEDURE — U0004 COV-19 TEST NON-CDC HGH THRU: HCPCS | Performed by: SURGERY

## 2021-08-13 PROCEDURE — C9803 HOPD COVID-19 SPEC COLLECT: HCPCS

## 2021-08-13 PROCEDURE — 93005 ELECTROCARDIOGRAM TRACING: CPT

## 2021-08-13 PROCEDURE — U0005 INFEC AGEN DETEC AMPLI PROBE: HCPCS | Performed by: SURGERY

## 2021-08-13 PROCEDURE — 80048 BASIC METABOLIC PNL TOTAL CA: CPT

## 2021-08-13 PROCEDURE — 36415 COLL VENOUS BLD VENIPUNCTURE: CPT

## 2021-08-13 RX ORDER — MELOXICAM 15 MG/1
15 TABLET ORAL DAILY
COMMUNITY

## 2021-08-13 RX ORDER — MEMANTINE HYDROCHLORIDE 5 MG/1
5 TABLET ORAL 2 TIMES DAILY
COMMUNITY

## 2021-08-13 RX ORDER — BUPROPION HYDROCHLORIDE 150 MG/1
300 TABLET, EXTENDED RELEASE ORAL DAILY
COMMUNITY

## 2021-08-13 RX ORDER — FLUOXETINE HYDROCHLORIDE 20 MG/1
40 CAPSULE ORAL DAILY
COMMUNITY

## 2021-08-13 NOTE — PAT
Nursing home called spoke with patients nurse for that day. Instructions given about meds and prep and npo status.

## 2021-08-13 NOTE — DISCHARGE INSTRUCTIONS
TAKE the following medications the morning of surgery:  All heart or blood pressure medications    HOLD all diabetic medications the morning of surgery as ordered by physician.    Please discontinue all blood thinners and anticoagulants (except aspirin) prior to surgery as per your surgeon and cardiologist instructions.  Aspirin may be continued up to the day prior to surgery.     CHLORHEXIDINE CLOTHS GIVEN WITH INSTRUCTIONS AND FORM TO RETURN TO HOSPITAL, IF APPLICABLE.    8/16/21  ARRIVAL TIME PER DR MARROQUIN OFFICE    General Instructions:  · Do not eat or drink after midnight:8/15/21  includes water, mints, or gum. You may brush your teeth.  Dental appliances that are removable must be taken out day of surgery.  · Do not smoke, chew tobacco, or drink alcohol 24 hours prior to surgery.  · Bring medications in original bottles, any inhalers and if applicable your C-PAP/BI-PAP machine.  · Bring any papers given to you in the doctor's office.  · Wear clean comfortable clothes and socks.  · Do not wear contact lenses or make-up. Bring a case for your glasses if applicable.  · Bring crutches or walker if applicable.  · Leave all other valuables and jewelry at home.    If you were given a blood bank ID arm band remember to bring it with you the day of surgery.    Preventing a Surgical Site Infection:  Shower the night before surgery (unless instructed other wise) using a fresh bar of anti-bacterial soap (such as Dial) and clean washcloth. Dry with a clean towel and dress in clean clothing.  For 2 to 3 days before surgery, avoid shaving with a razor near where you will have surgery because the razor can irritate skin and make it easier to develop an infection. Ask your surgeon if you will be receiving antibiotics prior to surgery.  Make sure you, your family, and all healthcare providers clear their hands with soap and water or an alcohol-based hand  before caring for you or your wound.  If at all possible,  quit smoking as many days before surgery as you can.    Day of surgery:  Upon arrival, a Pre-op nurse and Anesthesiologist will review your health history, obtain vital signs, and answer questions you may have. The only belongings needed at this time will be your home medications and if applicable your C-PAP/BI-PAP machine. If you are staying overnight your family can leave the rest of your belongings in the car and bring them to your room later. A Pre-op nurse will start an IV and you may receive medication in preparation for surgery, including something to help you relax. Your family will be able to see you in the Pre-op area. While you are in surgery your family should notify the waiting room  if they leave the waiting room area and provide a contact phone number.    Please be aware that surgery does come with discomfort. We want to make every effort to control your discomfort so please discuss any uncontrolled symptoms with your nurse. Your doctor will most likely have prescribed pain medications.    If you are going home after surgery you will receive individualized written care instructions before being discharged. A responsible adult must drive you to and from the hospital on the day of surgery and stay with you for 24 hours.    If you are staying overnight following surgery, you will be transported to your hospital room following the recovery period.  [facility] has all private rooms.    If you have any questions please call Pre-Admission Testing at [phone].  Deductibles and co-payments are collected on the day of service. Please be prepared to pay the required co-pay, deductible or deposit on the day of service as defined by your plan.    A RESPONSIBLE PERSON MUST REMAIN IN THE WAITING ROOM DURING YOUR PROCEDURE AND A RESPONSIBLE  MUST BE AVAILABLE UPON YOUR DISCHARGE.

## 2021-08-16 ENCOUNTER — HOSPITAL ENCOUNTER (OUTPATIENT)
Facility: HOSPITAL | Age: 76
Setting detail: HOSPITAL OUTPATIENT SURGERY
Discharge: NURSING, REHAB OR LONG-TERM CARE | End: 2021-08-16
Attending: SURGERY | Admitting: SURGERY

## 2021-08-16 ENCOUNTER — APPOINTMENT (OUTPATIENT)
Dept: GENERAL RADIOLOGY | Facility: HOSPITAL | Age: 76
End: 2021-08-16

## 2021-08-16 ENCOUNTER — ANESTHESIA EVENT (OUTPATIENT)
Dept: PERIOP | Facility: HOSPITAL | Age: 76
End: 2021-08-16

## 2021-08-16 ENCOUNTER — ANESTHESIA (OUTPATIENT)
Dept: PERIOP | Facility: HOSPITAL | Age: 76
End: 2021-08-16

## 2021-08-16 VITALS
OXYGEN SATURATION: 97 % | WEIGHT: 110.2 LBS | SYSTOLIC BLOOD PRESSURE: 142 MMHG | DIASTOLIC BLOOD PRESSURE: 83 MMHG | HEART RATE: 75 BPM | BODY MASS INDEX: 20.81 KG/M2 | TEMPERATURE: 97.5 F | RESPIRATION RATE: 16 BRPM | HEIGHT: 61 IN

## 2021-08-16 DIAGNOSIS — R10.13 EPIGASTRIC PAIN: ICD-10-CM

## 2021-08-16 DIAGNOSIS — K63.9 COLON WALL THICKENING: ICD-10-CM

## 2021-08-16 DIAGNOSIS — K80.10 CALCULUS OF GALLBLADDER WITH CHRONIC CHOLECYSTITIS WITHOUT OBSTRUCTION: ICD-10-CM

## 2021-08-16 PROCEDURE — 45331 SIGMOIDOSCOPY AND BIOPSY: CPT | Performed by: SURGERY

## 2021-08-16 PROCEDURE — 25010000002 ONDANSETRON PER 1 MG: Performed by: NURSE ANESTHETIST, CERTIFIED REGISTERED

## 2021-08-16 PROCEDURE — 43239 EGD BIOPSY SINGLE/MULTIPLE: CPT | Performed by: SURGERY

## 2021-08-16 PROCEDURE — 88304 TISSUE EXAM BY PATHOLOGIST: CPT | Performed by: SURGERY

## 2021-08-16 PROCEDURE — 25010000002 FENTANYL CITRATE (PF) 50 MCG/ML SOLUTION: Performed by: NURSE ANESTHETIST, CERTIFIED REGISTERED

## 2021-08-16 PROCEDURE — 25010000002 PROPOFOL 10 MG/ML EMULSION: Performed by: NURSE ANESTHETIST, CERTIFIED REGISTERED

## 2021-08-16 PROCEDURE — 87081 CULTURE SCREEN ONLY: CPT | Performed by: SURGERY

## 2021-08-16 PROCEDURE — 25010000002 NEOSTIGMINE 10 MG/10ML SOLUTION: Performed by: NURSE ANESTHETIST, CERTIFIED REGISTERED

## 2021-08-16 PROCEDURE — C1889 IMPLANT/INSERT DEVICE, NOC: HCPCS | Performed by: SURGERY

## 2021-08-16 PROCEDURE — 47562 LAPAROSCOPIC CHOLECYSTECTOMY: CPT | Performed by: SURGERY

## 2021-08-16 PROCEDURE — 25010000002 DROPERIDOL PER 5 MG: Performed by: NURSE ANESTHETIST, CERTIFIED REGISTERED

## 2021-08-16 DEVICE — LIGACLIP 10-M/L, 10MM ENDOSCOPIC ROTATING MULTIPLE CLIP APPLIERS
Type: IMPLANTABLE DEVICE | Site: ABDOMEN | Status: FUNCTIONAL
Brand: LIGACLIP

## 2021-08-16 RX ORDER — MAGNESIUM HYDROXIDE 1200 MG/15ML
LIQUID ORAL AS NEEDED
Status: DISCONTINUED | OUTPATIENT
Start: 2021-08-16 | End: 2021-08-16 | Stop reason: HOSPADM

## 2021-08-16 RX ORDER — FENTANYL CITRATE 50 UG/ML
50 INJECTION, SOLUTION INTRAMUSCULAR; INTRAVENOUS
Status: DISCONTINUED | OUTPATIENT
Start: 2021-08-16 | End: 2021-08-16 | Stop reason: HOSPADM

## 2021-08-16 RX ORDER — PROPOFOL 10 MG/ML
VIAL (ML) INTRAVENOUS AS NEEDED
Status: DISCONTINUED | OUTPATIENT
Start: 2021-08-16 | End: 2021-08-16 | Stop reason: SURG

## 2021-08-16 RX ORDER — HYDROCODONE BITARTRATE AND ACETAMINOPHEN 5; 325 MG/1; MG/1
1 TABLET ORAL EVERY 4 HOURS PRN
Status: DISCONTINUED | OUTPATIENT
Start: 2021-08-16 | End: 2021-08-16 | Stop reason: HOSPADM

## 2021-08-16 RX ORDER — FAMOTIDINE 10 MG/ML
INJECTION, SOLUTION INTRAVENOUS AS NEEDED
Status: DISCONTINUED | OUTPATIENT
Start: 2021-08-16 | End: 2021-08-16 | Stop reason: SURG

## 2021-08-16 RX ORDER — FENTANYL CITRATE 50 UG/ML
INJECTION, SOLUTION INTRAMUSCULAR; INTRAVENOUS AS NEEDED
Status: DISCONTINUED | OUTPATIENT
Start: 2021-08-16 | End: 2021-08-16 | Stop reason: SURG

## 2021-08-16 RX ORDER — ONDANSETRON 2 MG/ML
INJECTION INTRAMUSCULAR; INTRAVENOUS AS NEEDED
Status: DISCONTINUED | OUTPATIENT
Start: 2021-08-16 | End: 2021-08-16 | Stop reason: SURG

## 2021-08-16 RX ORDER — IPRATROPIUM BROMIDE AND ALBUTEROL SULFATE 2.5; .5 MG/3ML; MG/3ML
3 SOLUTION RESPIRATORY (INHALATION) ONCE AS NEEDED
Status: DISCONTINUED | OUTPATIENT
Start: 2021-08-16 | End: 2021-08-16 | Stop reason: HOSPADM

## 2021-08-16 RX ORDER — NEOSTIGMINE METHYLSULFATE 1 MG/ML
INJECTION, SOLUTION INTRAVENOUS AS NEEDED
Status: DISCONTINUED | OUTPATIENT
Start: 2021-08-16 | End: 2021-08-16 | Stop reason: SURG

## 2021-08-16 RX ORDER — GLYCOPYRROLATE 0.2 MG/ML
INJECTION INTRAMUSCULAR; INTRAVENOUS AS NEEDED
Status: DISCONTINUED | OUTPATIENT
Start: 2021-08-16 | End: 2021-08-16 | Stop reason: SURG

## 2021-08-16 RX ORDER — MIDAZOLAM HYDROCHLORIDE 1 MG/ML
0.5 INJECTION INTRAMUSCULAR; INTRAVENOUS
Status: DISCONTINUED | OUTPATIENT
Start: 2021-08-16 | End: 2021-08-16 | Stop reason: HOSPADM

## 2021-08-16 RX ORDER — DROPERIDOL 2.5 MG/ML
0.62 INJECTION, SOLUTION INTRAMUSCULAR; INTRAVENOUS ONCE AS NEEDED
Status: COMPLETED | OUTPATIENT
Start: 2021-08-16 | End: 2021-08-16

## 2021-08-16 RX ORDER — MEPERIDINE HYDROCHLORIDE 25 MG/ML
12.5 INJECTION INTRAMUSCULAR; INTRAVENOUS; SUBCUTANEOUS
Status: DISCONTINUED | OUTPATIENT
Start: 2021-08-16 | End: 2021-08-16 | Stop reason: HOSPADM

## 2021-08-16 RX ORDER — HYDROCODONE BITARTRATE AND ACETAMINOPHEN 5; 325 MG/1; MG/1
1 TABLET ORAL EVERY 6 HOURS PRN
Qty: 10 TABLET | Refills: 0 | Status: SHIPPED | OUTPATIENT
Start: 2021-08-16 | End: 2021-08-26

## 2021-08-16 RX ORDER — SODIUM CHLORIDE 9 MG/ML
INJECTION, SOLUTION INTRAVENOUS AS NEEDED
Status: DISCONTINUED | OUTPATIENT
Start: 2021-08-16 | End: 2021-08-16 | Stop reason: HOSPADM

## 2021-08-16 RX ORDER — ONDANSETRON 2 MG/ML
4 INJECTION INTRAMUSCULAR; INTRAVENOUS AS NEEDED
Status: DISCONTINUED | OUTPATIENT
Start: 2021-08-16 | End: 2021-08-16 | Stop reason: HOSPADM

## 2021-08-16 RX ORDER — KETOROLAC TROMETHAMINE 30 MG/ML
15 INJECTION, SOLUTION INTRAMUSCULAR; INTRAVENOUS EVERY 6 HOURS PRN
Status: DISCONTINUED | OUTPATIENT
Start: 2021-08-16 | End: 2021-08-16 | Stop reason: HOSPADM

## 2021-08-16 RX ORDER — BUPIVACAINE HYDROCHLORIDE AND EPINEPHRINE 5; 5 MG/ML; UG/ML
INJECTION, SOLUTION PERINEURAL AS NEEDED
Status: DISCONTINUED | OUTPATIENT
Start: 2021-08-16 | End: 2021-08-16 | Stop reason: HOSPADM

## 2021-08-16 RX ORDER — SODIUM CHLORIDE, SODIUM LACTATE, POTASSIUM CHLORIDE, CALCIUM CHLORIDE 600; 310; 30; 20 MG/100ML; MG/100ML; MG/100ML; MG/100ML
100 INJECTION, SOLUTION INTRAVENOUS ONCE AS NEEDED
Status: DISCONTINUED | OUTPATIENT
Start: 2021-08-16 | End: 2021-08-16 | Stop reason: HOSPADM

## 2021-08-16 RX ORDER — SODIUM CHLORIDE, SODIUM LACTATE, POTASSIUM CHLORIDE, CALCIUM CHLORIDE 600; 310; 30; 20 MG/100ML; MG/100ML; MG/100ML; MG/100ML
125 INJECTION, SOLUTION INTRAVENOUS ONCE
Status: COMPLETED | OUTPATIENT
Start: 2021-08-16 | End: 2021-08-16

## 2021-08-16 RX ORDER — LIDOCAINE HYDROCHLORIDE 20 MG/ML
INJECTION, SOLUTION INFILTRATION; PERINEURAL AS NEEDED
Status: DISCONTINUED | OUTPATIENT
Start: 2021-08-16 | End: 2021-08-16 | Stop reason: SURG

## 2021-08-16 RX ORDER — SODIUM CHLORIDE 0.9 % (FLUSH) 0.9 %
10 SYRINGE (ML) INJECTION AS NEEDED
Status: DISCONTINUED | OUTPATIENT
Start: 2021-08-16 | End: 2021-08-16 | Stop reason: HOSPADM

## 2021-08-16 RX ORDER — ROCURONIUM BROMIDE 10 MG/ML
INJECTION, SOLUTION INTRAVENOUS AS NEEDED
Status: DISCONTINUED | OUTPATIENT
Start: 2021-08-16 | End: 2021-08-16 | Stop reason: SURG

## 2021-08-16 RX ORDER — OXYCODONE HYDROCHLORIDE AND ACETAMINOPHEN 5; 325 MG/1; MG/1
1 TABLET ORAL ONCE AS NEEDED
Status: DISCONTINUED | OUTPATIENT
Start: 2021-08-16 | End: 2021-08-16 | Stop reason: HOSPADM

## 2021-08-16 RX ORDER — SODIUM CHLORIDE 0.9 % (FLUSH) 0.9 %
10 SYRINGE (ML) INJECTION EVERY 12 HOURS SCHEDULED
Status: DISCONTINUED | OUTPATIENT
Start: 2021-08-16 | End: 2021-08-16 | Stop reason: HOSPADM

## 2021-08-16 RX ADMIN — PROPOFOL 80 MG: 10 INJECTION, EMULSION INTRAVENOUS at 13:27

## 2021-08-16 RX ADMIN — FENTANYL CITRATE 100 MCG: 50 INJECTION INTRAMUSCULAR; INTRAVENOUS at 13:21

## 2021-08-16 RX ADMIN — FAMOTIDINE 20 MG: 10 INJECTION INTRAVENOUS at 13:21

## 2021-08-16 RX ADMIN — LIDOCAINE HYDROCHLORIDE 40 MG: 20 INJECTION, SOLUTION INFILTRATION; PERINEURAL at 13:21

## 2021-08-16 RX ADMIN — DROPERIDOL 0.62 MG: 2.5 INJECTION, SOLUTION INTRAMUSCULAR; INTRAVENOUS at 15:00

## 2021-08-16 RX ADMIN — SODIUM CHLORIDE, POTASSIUM CHLORIDE, SODIUM LACTATE AND CALCIUM CHLORIDE: 600; 310; 30; 20 INJECTION, SOLUTION INTRAVENOUS at 13:21

## 2021-08-16 RX ADMIN — ONDANSETRON 4 MG: 2 INJECTION INTRAMUSCULAR; INTRAVENOUS at 13:21

## 2021-08-16 RX ADMIN — GLYCOPYRROLATE 0.4 MG: 0.2 INJECTION, SOLUTION INTRAMUSCULAR; INTRAVENOUS at 14:04

## 2021-08-16 RX ADMIN — NEOSTIGMINE 3 MG: 1 INJECTION INTRAVENOUS at 14:04

## 2021-08-16 RX ADMIN — ROCURONIUM BROMIDE 20 MG: 10 SOLUTION INTRAVENOUS at 13:27

## 2021-08-16 NOTE — ANESTHESIA PROCEDURE NOTES
Airway  Urgency: elective    Date/Time: 8/16/2021 1:27 PM  Airway not difficult    General Information and Staff    Patient location during procedure: OR  Anesthesiologist: Rogerio Landaverde DO  CRNA: Shelley Crespo CRNA    Indications and Patient Condition  Indications for airway management: airway protection    Preoxygenated: yes  MILS maintained throughout  Mask difficulty assessment: 2 - vent by mask + OA or adjuvant +/- NMBA    Final Airway Details  Final airway type: endotracheal airway      Successful airway: ETT  Cuffed: yes   Successful intubation technique: direct laryngoscopy  Endotracheal tube insertion site: oral  Blade: Catherine  Blade size: 3  ETT size (mm): 7.5  Cormack-Lehane Classification: grade IIa - partial view of glottis  Placement verified by: chest auscultation, capnometry and palpation of cuff   Cuff volume (mL): 6  Measured from: lips  ETT/EBT  to lips (cm): 22  Number of attempts at approach: 1  Assessment: lips, teeth, and gum same as pre-op and atraumatic intubation    Additional Comments  Dentition as preop. No complications noted. Patient tolerated well.  ETT secured.

## 2021-08-16 NOTE — ANESTHESIA PREPROCEDURE EVALUATION
Anesthesia Evaluation     Patient summary reviewed and Nursing notes reviewed   no history of anesthetic complications:  NPO Solid Status: > 8 hours  NPO Liquid Status: > 8 hours           Airway   Mallampati: II  TM distance: >3 FB  Neck ROM: full  No difficulty expected  Dental    (+) implants    Pulmonary - normal exam    breath sounds clear to auscultation  (+) a smoker Former, COPD, asthma,  Cardiovascular - normal exam    ECG reviewed  Rhythm: regular  Rate: normal    (+) hypertension, DVT resolved,       Neuro/Psych  (+) psychiatric history Depression and Anxiety, dementia, poor historian.,     GI/Hepatic/Renal/Endo    (+)  GI bleeding resolved,     Musculoskeletal (-) negative ROS    Abdominal  - normal exam   Substance History - negative use     OB/GYN negative ob/gyn ROS         Other   blood dyscrasia,   history of cancer (hx myelofibrosis s/p bone marrow transplant) remission    ROS/Med Hx Other: Echo 5/2018:  ·Estimated EF appears to be in the range of 56 - 60%. Normal left ventricular cavity size and wall thickness noted. All left ventricular wall segments contract normally. Left ventricular diastolic dysfunction is noted (grade I) consistent with impaired relaxation.                   Anesthesia Plan    ASA 3     general     intravenous induction     Anesthetic plan, all risks, benefits, and alternatives have been provided, discussed and informed consent has been obtained with: patient and healthcare power of .    Plan discussed with CRNA.

## 2021-08-16 NOTE — ANESTHESIA POSTPROCEDURE EVALUATION
Patient: Lacie Win    Procedure Summary     Date: 08/16/21 Room / Location: [facility] OR 01 /  [facility]    Anesthesia Start: 1321 Anesthesia Stop: 1430    Procedures:       CHOLECYSTECTOMY LAPAROSCOPIC (N/A Abdomen)      ESOPHAGOGASTRODUODENOSCOPY (N/A Esophagus)      COLONOSCOPY (N/A ) Diagnosis:       Calculus of gallbladder with chronic cholecystitis without obstruction      Epigastric pain      Colon wall thickening      (Calculus of gallbladder with chronic cholecystitis without obstruction [K80.10])      (Epigastric pain [R10.13])      (Colon wall thickening [K63.9])    Surgeons: Michel Laws MD Provider: Rogerio Landaverde DO    Anesthesia Type: general ASA Status: 3          Anesthesia Type: general    Vitals  Vitals Value Taken Time   /90 08/16/21 1502   Temp 98 °F (36.7 °C) 08/16/21 1431   Pulse 72 08/16/21 1502   Resp 16 08/16/21 1502   SpO2 98 % 08/16/21 1502           Post Anesthesia Care and Evaluation    Patient location during evaluation: PHASE II  Patient participation: complete - patient participated  Level of consciousness: awake and alert  Pain score: 1  Pain management: adequate  Airway patency: patent  Anesthetic complications: No anesthetic complications  PONV Status: controlled  Cardiovascular status: acceptable  Respiratory status: acceptable and room air  Hydration status: acceptable  No anesthesia care post op

## 2021-08-17 LAB — UREASE TISS QL: NEGATIVE

## 2021-08-19 LAB — LAB AP CASE REPORT: NORMAL

## 2021-08-31 ENCOUNTER — OFFICE VISIT (OUTPATIENT)
Dept: SURGERY | Facility: CLINIC | Age: 76
End: 2021-08-31

## 2021-08-31 VITALS — HEART RATE: 97 BPM | DIASTOLIC BLOOD PRESSURE: 74 MMHG | SYSTOLIC BLOOD PRESSURE: 137 MMHG

## 2021-08-31 DIAGNOSIS — Z90.49 STATUS POST LAPAROSCOPIC CHOLECYSTECTOMY: Primary | ICD-10-CM

## 2021-08-31 PROCEDURE — 99024 POSTOP FOLLOW-UP VISIT: CPT | Performed by: SURGERY

## 2021-08-31 NOTE — PROGRESS NOTES
Subjective   Lacie Win is a 76 y.o. female.     Chief Complaint: post lap benito and colonoscopy    History of Present Illness She had extensive adhesions but was able to have a laparoscopic cholecystectomy. Her EGD showed minimal gastritis, but her colonoscopy could not be done due to what looked like no prep. She does seem to be doing better eating since the surgery    The following portions of the patient's history were reviewed and updated as appropriate: current medications, past family history, past medical history, past social history, past surgical history and problem list.    Review of Systems    Objective   Physical Exam wounds ok, ALLISON drain removed.    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)      &    • History of blood clots    • History of transfusion    • HTN (hypertension)    • Myelofibrosis (CMS/HCC)     Bone marrow   • Panic attacks    • Psychiatric illness        Family History   Problem Relation Age of Onset   • Cancer Father    • Cancer Mother    • Depression Mother    • Anxiety disorder Mother        Social History     Tobacco Use   • Smoking status: Former Smoker   • Smokeless tobacco: Never Used   • Tobacco comment: quit approximately 30 years ago   Vaping Use   • Vaping Use: Never used   Substance Use Topics   • Alcohol use: No   • Drug use: No       Past Surgical History:   Procedure Laterality Date   • ABDOMINAL SURGERY     • BONE MARROW BIOPSY     •  SECTION     • CHOLECYSTECTOMY N/A 2021    Procedure: CHOLECYSTECTOMY LAPAROSCOPIC;  Surgeon: Michel Laws MD;  Location: [facility];  Service: General;  Laterality: N/A;   • COLON RESECTION      due to tumor which turned out to be  benign. pt unable to remember date.    • COLON SURGERY     • COLONOSCOPY     • COLONOSCOPY N/A 8/16/2021    Procedure: COLONOSCOPY;  Surgeon: Michel Laws MD;  Location:  [facility];  Service: General;  Laterality: N/A;   • ENDOSCOPY     • ENDOSCOPY N/A 8/16/2021    Procedure: ESOPHAGOGASTRODUODENOSCOPY;  Surgeon: Michel Laws MD;  Location:  [facility];  Service: General;  Laterality: N/A;   • SAMAN FILTER INSERTION JUGULAR     • SKIN CANCER EXCISION     • SMALL INTESTINE SURGERY     • SPLENECTOMY     • SPLENECTOMY, PARTIAL      as part of the bone marrow transplant.    • TRANSURETHRAL RESECTION OF BLADDER TUMOR         Current Outpatient Medications   Medication Instructions   • acetaminophen (TYLENOL) 500 mg, Oral, Every 6 Hours PRN   • amLODIPine (NORVASC) 5 mg, Oral, Daily   • apixaban (ELIQUIS) 5 mg, Oral, Every 12 Hours Scheduled   • bisacodyl (DULCOLAX) 10 mg, Oral, Daily PRN   • Brexpiprazole (Rexulti) 0.5 MG tablet Oral   • buPROPion SR (WELLBUTRIN SR) 300 mg, Oral, Daily   • carbidopa-levodopa (SINEMET)  MG per tablet 1 tablet, Oral, 3 Times Daily   • docusate sodium (COLACE) 250 mg, Oral, Daily   • dronabinol (MARINOL) 5 mg, Oral, 2 Times Daily Before Meals   • FLUoxetine (PROZAC) 40 mg, Oral, Daily   • hydrOXYzine (ATARAX) 25 mg, Oral, 3 Times Daily PRN   • LORazepam (ATIVAN) 1 MG tablet Take 1/2 tablet in the AM<BR>1/2 in the afternoon<BR>1 whole tablet at HS   • meloxicam (MOBIC) 15 mg, Oral, Daily   • memantine (NAMENDA) 5 mg, Oral, 2 Times Daily   • Multiple Vitamins-Minerals (MULTIVITAMIN WITH MINERALS) tablet tablet 1 tablet, Oral, Daily   • polyethyl glycol-propyl glycol (SYSTANE) 0.4-0.3 % solution ophthalmic solution 2 drops, Every 1 Hour PRN   • polyethylene glycol (MIRALAX) packet 17 g, Oral, Daily   • Suprep Bowel Prep Kit 17.5-3.13-1.6 GM/177ML solution oral solution Dispense one Kit        Sig: Used as Directed         Assessment/Plan   Diagnoses and all orders for this  visit:    1. Status post laparoscopic cholecystectomy (Primary)    discussed rescheduling colonoscopy but the family wants to wait for now.

## 2021-10-26 ENCOUNTER — APPOINTMENT (OUTPATIENT)
Dept: GENERAL RADIOLOGY | Facility: HOSPITAL | Age: 76
End: 2021-10-26

## 2021-10-26 ENCOUNTER — HOSPITAL ENCOUNTER (EMERGENCY)
Facility: HOSPITAL | Age: 76
Discharge: NURSING, REHAB OR LONG-TERM CARE | End: 2021-10-26
Attending: EMERGENCY MEDICINE | Admitting: EMERGENCY MEDICINE

## 2021-10-26 ENCOUNTER — APPOINTMENT (OUTPATIENT)
Dept: CT IMAGING | Facility: HOSPITAL | Age: 76
End: 2021-10-26

## 2021-10-26 VITALS
HEART RATE: 70 BPM | HEIGHT: 60 IN | OXYGEN SATURATION: 99 % | RESPIRATION RATE: 20 BRPM | TEMPERATURE: 98 F | SYSTOLIC BLOOD PRESSURE: 127 MMHG | DIASTOLIC BLOOD PRESSURE: 95 MMHG | BODY MASS INDEX: 20.62 KG/M2 | WEIGHT: 105 LBS

## 2021-10-26 DIAGNOSIS — S09.90XA INJURY OF HEAD, INITIAL ENCOUNTER: Primary | ICD-10-CM

## 2021-10-26 PROCEDURE — 70450 CT HEAD/BRAIN W/O DYE: CPT | Performed by: RADIOLOGY

## 2021-10-26 PROCEDURE — 99283 EMERGENCY DEPT VISIT LOW MDM: CPT

## 2021-10-26 PROCEDURE — 70450 CT HEAD/BRAIN W/O DYE: CPT

## 2021-10-26 PROCEDURE — 73502 X-RAY EXAM HIP UNI 2-3 VIEWS: CPT | Performed by: RADIOLOGY

## 2021-10-26 PROCEDURE — 73130 X-RAY EXAM OF HAND: CPT

## 2021-10-26 PROCEDURE — 73502 X-RAY EXAM HIP UNI 2-3 VIEWS: CPT

## 2021-10-26 PROCEDURE — 73130 X-RAY EXAM OF HAND: CPT | Performed by: RADIOLOGY

## 2022-08-04 ENCOUNTER — LAB REQUISITION (OUTPATIENT)
Dept: LAB | Facility: HOSPITAL | Age: 77
End: 2022-08-04
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
## 2022-08-04 LAB
AMORPH URATE CRY URNS QL MICRO: ABNORMAL /HPF
BACTERIA UR QL AUTO: ABNORMAL /HPF
BILIRUB UR QL STRIP: NEGATIVE
CLARITY UR: ABNORMAL
COLOR UR: ABNORMAL
GLUCOSE UR STRIP-MCNC: NEGATIVE MG/DL
HGB UR QL STRIP.AUTO: NEGATIVE
HYALINE CASTS UR QL AUTO: ABNORMAL /LPF
KETONES UR QL STRIP: NEGATIVE
LEUKOCYTE ESTERASE UR QL STRIP.AUTO: ABNORMAL
NITRITE UR QL STRIP: POSITIVE
PH UR STRIP.AUTO: 7 [PH] (ref 5–8)
PROT UR QL STRIP: ABNORMAL
RBC # UR STRIP: ABNORMAL /HPF
REF LAB TEST METHOD: ABNORMAL
SP GR UR STRIP: 1.02 (ref 1–1.03)
SQUAMOUS #/AREA URNS HPF: ABNORMAL /HPF
TRI-PHOS CRY URNS QL MICRO: ABNORMAL /HPF
UROBILINOGEN UR QL STRIP: ABNORMAL
WBC # UR STRIP: ABNORMAL /HPF

## 2022-08-04 PROCEDURE — 81001 URINALYSIS AUTO W/SCOPE: CPT | Performed by: INTERNAL MEDICINE

## 2022-08-04 PROCEDURE — 87086 URINE CULTURE/COLONY COUNT: CPT | Performed by: INTERNAL MEDICINE

## 2022-08-06 LAB — BACTERIA SPEC AEROBE CULT: NORMAL

## 2022-11-30 ENCOUNTER — LAB REQUISITION (OUTPATIENT)
Dept: LAB | Facility: HOSPITAL | Age: 77
End: 2022-11-30

## 2022-11-30 DIAGNOSIS — A04.72 ENTEROCOLITIS DUE TO CLOSTRIDIUM DIFFICILE, NOT SPECIFIED AS RECURRENT: ICD-10-CM

## 2022-11-30 LAB
027 TOXIN: NORMAL
C DIFF TOX GENS STL QL NAA+PROBE: NEGATIVE

## 2022-11-30 PROCEDURE — 87493 C DIFF AMPLIFIED PROBE: CPT | Performed by: INTERNAL MEDICINE

## 2023-03-19 ENCOUNTER — LAB REQUISITION (OUTPATIENT)
Dept: LAB | Facility: HOSPITAL | Age: 78
End: 2023-03-19
Payer: MEDICARE

## 2023-03-19 DIAGNOSIS — D64.9 ANEMIA, UNSPECIFIED: ICD-10-CM

## 2023-03-19 DIAGNOSIS — R31.9 HEMATURIA, UNSPECIFIED: ICD-10-CM

## 2023-03-19 LAB
BACTERIA UR QL AUTO: ABNORMAL /HPF
BASOPHILS # BLD AUTO: 0.05 10*3/MM3 (ref 0–0.2)
BASOPHILS NFR BLD AUTO: 0.6 % (ref 0–1.5)
BILIRUB UR QL STRIP: NEGATIVE
CLARITY UR: ABNORMAL
COLOR UR: ABNORMAL
DEPRECATED RDW RBC AUTO: 50.2 FL (ref 37–54)
EOSINOPHIL # BLD AUTO: 0.06 10*3/MM3 (ref 0–0.4)
EOSINOPHIL NFR BLD AUTO: 0.7 % (ref 0.3–6.2)
ERYTHROCYTE [DISTWIDTH] IN BLOOD BY AUTOMATED COUNT: 14.1 % (ref 12.3–15.4)
GLUCOSE UR STRIP-MCNC: NEGATIVE MG/DL
HCT VFR BLD AUTO: 45.5 % (ref 34–46.6)
HGB BLD-MCNC: 15 G/DL (ref 12–15.9)
HGB UR QL STRIP.AUTO: ABNORMAL
HYALINE CASTS UR QL AUTO: ABNORMAL /LPF
IMM GRANULOCYTES # BLD AUTO: 0.01 10*3/MM3 (ref 0–0.05)
IMM GRANULOCYTES NFR BLD AUTO: 0.1 % (ref 0–0.5)
KETONES UR QL STRIP: ABNORMAL
LEUKOCYTE ESTERASE UR QL STRIP.AUTO: ABNORMAL
LYMPHOCYTES # BLD AUTO: 2.64 10*3/MM3 (ref 0.7–3.1)
LYMPHOCYTES NFR BLD AUTO: 31.4 % (ref 19.6–45.3)
MCH RBC QN AUTO: 32.1 PG (ref 26.6–33)
MCHC RBC AUTO-ENTMCNC: 33 G/DL (ref 31.5–35.7)
MCV RBC AUTO: 97.2 FL (ref 79–97)
MONOCYTES # BLD AUTO: 0.79 10*3/MM3 (ref 0.1–0.9)
MONOCYTES NFR BLD AUTO: 9.4 % (ref 5–12)
NEUTROPHILS NFR BLD AUTO: 4.85 10*3/MM3 (ref 1.7–7)
NEUTROPHILS NFR BLD AUTO: 57.8 % (ref 42.7–76)
NITRITE UR QL STRIP: NEGATIVE
NRBC BLD AUTO-RTO: 0 /100 WBC (ref 0–0.2)
PH UR STRIP.AUTO: 7.5 [PH] (ref 5–8)
PLATELET # BLD AUTO: 351 10*3/MM3 (ref 140–450)
PMV BLD AUTO: 10.6 FL (ref 6–12)
PROT UR QL STRIP: ABNORMAL
RBC # BLD AUTO: 4.68 10*6/MM3 (ref 3.77–5.28)
RBC # UR STRIP: ABNORMAL /HPF
REF LAB TEST METHOD: ABNORMAL
SP GR UR STRIP: 1.02 (ref 1–1.03)
SQUAMOUS #/AREA URNS HPF: ABNORMAL /HPF
UROBILINOGEN UR QL STRIP: ABNORMAL
WBC # UR STRIP: ABNORMAL /HPF
WBC NRBC COR # BLD: 8.4 10*3/MM3 (ref 3.4–10.8)
YEAST URNS QL MICRO: ABNORMAL /HPF

## 2023-03-19 PROCEDURE — 87086 URINE CULTURE/COLONY COUNT: CPT | Performed by: INTERNAL MEDICINE

## 2023-03-19 PROCEDURE — 87147 CULTURE TYPE IMMUNOLOGIC: CPT | Performed by: INTERNAL MEDICINE

## 2023-03-19 PROCEDURE — 85025 COMPLETE CBC W/AUTO DIFF WBC: CPT | Performed by: INTERNAL MEDICINE

## 2023-03-19 PROCEDURE — 81001 URINALYSIS AUTO W/SCOPE: CPT | Performed by: INTERNAL MEDICINE

## 2023-03-21 LAB
BACTERIA SPEC AEROBE CULT: ABNORMAL
BACTERIA SPEC AEROBE CULT: ABNORMAL

## 2023-03-25 ENCOUNTER — LAB REQUISITION (OUTPATIENT)
Dept: LAB | Facility: HOSPITAL | Age: 78
End: 2023-03-25
Payer: MEDICARE

## 2023-03-25 DIAGNOSIS — R31.9 HEMATURIA, UNSPECIFIED: ICD-10-CM

## 2023-03-25 LAB
BACTERIA UR QL AUTO: ABNORMAL /HPF
BILIRUB UR QL STRIP: ABNORMAL
CLARITY UR: ABNORMAL
COLOR UR: ABNORMAL
GLUCOSE UR STRIP-MCNC: NEGATIVE MG/DL
HGB UR QL STRIP.AUTO: ABNORMAL
HYALINE CASTS UR QL AUTO: ABNORMAL /LPF
KETONES UR QL STRIP: NEGATIVE
LEUKOCYTE ESTERASE UR QL STRIP.AUTO: ABNORMAL
NITRITE UR QL STRIP: POSITIVE
PH UR STRIP.AUTO: >=9 [PH] (ref 5–8)
PROT UR QL STRIP: ABNORMAL
RBC # UR STRIP: ABNORMAL /HPF
REF LAB TEST METHOD: ABNORMAL
SP GR UR STRIP: >=1.03 (ref 1–1.03)
SQUAMOUS #/AREA URNS HPF: ABNORMAL /HPF
UROBILINOGEN UR QL STRIP: ABNORMAL
WBC # UR STRIP: ABNORMAL /HPF

## 2023-03-25 PROCEDURE — 87077 CULTURE AEROBIC IDENTIFY: CPT | Performed by: INTERNAL MEDICINE

## 2023-03-25 PROCEDURE — 81001 URINALYSIS AUTO W/SCOPE: CPT | Performed by: INTERNAL MEDICINE

## 2023-03-25 PROCEDURE — 87086 URINE CULTURE/COLONY COUNT: CPT | Performed by: INTERNAL MEDICINE

## 2023-03-25 PROCEDURE — 87186 SC STD MICRODIL/AGAR DIL: CPT | Performed by: INTERNAL MEDICINE

## 2023-03-27 LAB — BACTERIA SPEC AEROBE CULT: ABNORMAL

## 2024-02-15 ENCOUNTER — TRANSCRIBE ORDERS (OUTPATIENT)
Dept: ADMINISTRATIVE | Facility: HOSPITAL | Age: 79
End: 2024-02-15
Payer: MEDICARE

## 2024-02-15 DIAGNOSIS — K31.84 GASTROPARESIS: Primary | ICD-10-CM

## 2024-04-12 ENCOUNTER — HOSPITAL ENCOUNTER (OUTPATIENT)
Dept: NUCLEAR MEDICINE | Facility: HOSPITAL | Age: 79
Discharge: HOME | End: 2024-04-12
Payer: MEDICARE

## 2024-04-12 DIAGNOSIS — K31.84 GASTROPARESIS: ICD-10-CM

## 2024-04-14 ENCOUNTER — LAB REQUISITION (OUTPATIENT)
Dept: LAB | Facility: HOSPITAL | Age: 79
End: 2024-04-14
Payer: MEDICARE

## 2024-04-14 DIAGNOSIS — N39.0 URINARY TRACT INFECTION, SITE NOT SPECIFIED: ICD-10-CM

## 2024-04-14 DIAGNOSIS — I10 ESSENTIAL (PRIMARY) HYPERTENSION: ICD-10-CM

## 2024-04-14 LAB
BACTERIA UR QL AUTO: ABNORMAL /HPF
BASOPHILS # BLD AUTO: 0.06 10*3/MM3 (ref 0–0.2)
BASOPHILS NFR BLD AUTO: 0.3 % (ref 0–1.5)
BILIRUB UR QL STRIP: NEGATIVE
CLARITY UR: ABNORMAL
COLOR UR: ABNORMAL
DEPRECATED RDW RBC AUTO: 51.4 FL (ref 37–54)
EOSINOPHIL # BLD AUTO: 0 10*3/MM3 (ref 0–0.4)
EOSINOPHIL NFR BLD AUTO: 0 % (ref 0.3–6.2)
ERYTHROCYTE [DISTWIDTH] IN BLOOD BY AUTOMATED COUNT: 15.2 % (ref 12.3–15.4)
GIANT PLATELETS: NORMAL
GLUCOSE UR STRIP-MCNC: NEGATIVE MG/DL
HCT VFR BLD AUTO: 46.6 % (ref 34–46.6)
HGB BLD-MCNC: 15.8 G/DL (ref 12–15.9)
HGB UR QL STRIP.AUTO: ABNORMAL
HYALINE CASTS UR QL AUTO: ABNORMAL /LPF
IMM GRANULOCYTES # BLD AUTO: 0.1 10*3/MM3 (ref 0–0.05)
IMM GRANULOCYTES NFR BLD AUTO: 0.6 % (ref 0–0.5)
KETONES UR QL STRIP: ABNORMAL
LEUKOCYTE ESTERASE UR QL STRIP.AUTO: ABNORMAL
LYMPHOCYTES # BLD AUTO: 1.17 10*3/MM3 (ref 0.7–3.1)
LYMPHOCYTES NFR BLD AUTO: 6.6 % (ref 19.6–45.3)
MCH RBC QN AUTO: 31.3 PG (ref 26.6–33)
MCHC RBC AUTO-ENTMCNC: 33.9 G/DL (ref 31.5–35.7)
MCV RBC AUTO: 92.5 FL (ref 79–97)
MONOCYTES # BLD AUTO: 1.01 10*3/MM3 (ref 0.1–0.9)
MONOCYTES NFR BLD AUTO: 5.7 % (ref 5–12)
NEUTROPHILS NFR BLD AUTO: 15.26 10*3/MM3 (ref 1.7–7)
NEUTROPHILS NFR BLD AUTO: 86.8 % (ref 42.7–76)
NITRITE UR QL STRIP: POSITIVE
NRBC BLD AUTO-RTO: 0 /100 WBC (ref 0–0.2)
PH UR STRIP.AUTO: 6 [PH] (ref 5–8)
PLATELET # BLD AUTO: 244 10*3/MM3 (ref 140–450)
PMV BLD AUTO: 12.6 FL (ref 6–12)
PROT UR QL STRIP: ABNORMAL
RBC # BLD AUTO: 5.04 10*6/MM3 (ref 3.77–5.28)
RBC # UR STRIP: ABNORMAL /HPF
RBC MORPH BLD: NORMAL
REF LAB TEST METHOD: ABNORMAL
SP GR UR STRIP: 1.02 (ref 1–1.03)
SQUAMOUS #/AREA URNS HPF: ABNORMAL /HPF
UROBILINOGEN UR QL STRIP: ABNORMAL
WBC # UR STRIP: ABNORMAL /HPF
WBC NRBC COR # BLD AUTO: 17.6 10*3/MM3 (ref 3.4–10.8)

## 2024-04-14 PROCEDURE — 87186 SC STD MICRODIL/AGAR DIL: CPT | Performed by: INTERNAL MEDICINE

## 2024-04-14 PROCEDURE — 87077 CULTURE AEROBIC IDENTIFY: CPT | Performed by: INTERNAL MEDICINE

## 2024-04-14 PROCEDURE — 81001 URINALYSIS AUTO W/SCOPE: CPT | Performed by: INTERNAL MEDICINE

## 2024-04-14 PROCEDURE — 85007 BL SMEAR W/DIFF WBC COUNT: CPT | Performed by: INTERNAL MEDICINE

## 2024-04-14 PROCEDURE — 87086 URINE CULTURE/COLONY COUNT: CPT | Performed by: INTERNAL MEDICINE

## 2024-04-14 PROCEDURE — 85025 COMPLETE CBC W/AUTO DIFF WBC: CPT | Performed by: INTERNAL MEDICINE

## 2024-04-18 LAB
BACTERIA SPEC AEROBE CULT: ABNORMAL
BACTERIA SPEC AEROBE CULT: ABNORMAL

## 2024-11-29 ENCOUNTER — LAB REQUISITION (OUTPATIENT)
Dept: LAB | Facility: HOSPITAL | Age: 79
End: 2024-11-29
Payer: MEDICARE

## 2024-11-29 DIAGNOSIS — D64.9 ANEMIA, UNSPECIFIED: ICD-10-CM

## 2024-11-29 DIAGNOSIS — R05.1 ACUTE COUGH: ICD-10-CM

## 2024-11-29 LAB
B PARAPERT DNA SPEC QL NAA+PROBE: NOT DETECTED
B PERT DNA SPEC QL NAA+PROBE: NOT DETECTED
BASOPHILS # BLD AUTO: 0.06 10*3/MM3 (ref 0–0.2)
BASOPHILS NFR BLD AUTO: 0.7 % (ref 0–1.5)
C PNEUM DNA NPH QL NAA+NON-PROBE: NOT DETECTED
DEPRECATED RDW RBC AUTO: 60.2 FL (ref 37–54)
EOSINOPHIL # BLD AUTO: 0.13 10*3/MM3 (ref 0–0.4)
EOSINOPHIL NFR BLD AUTO: 1.5 % (ref 0.3–6.2)
ERYTHROCYTE [DISTWIDTH] IN BLOOD BY AUTOMATED COUNT: 16.6 % (ref 12.3–15.4)
FLUAV SUBTYP SPEC NAA+PROBE: NOT DETECTED
FLUBV RNA ISLT QL NAA+PROBE: NOT DETECTED
HADV DNA SPEC NAA+PROBE: NOT DETECTED
HCOV 229E RNA SPEC QL NAA+PROBE: NOT DETECTED
HCOV HKU1 RNA SPEC QL NAA+PROBE: NOT DETECTED
HCOV NL63 RNA SPEC QL NAA+PROBE: NOT DETECTED
HCOV OC43 RNA SPEC QL NAA+PROBE: NOT DETECTED
HCT VFR BLD AUTO: 42.2 % (ref 34–46.6)
HGB BLD-MCNC: 13.8 G/DL (ref 12–15.9)
HMPV RNA NPH QL NAA+NON-PROBE: NOT DETECTED
HPIV1 RNA ISLT QL NAA+PROBE: NOT DETECTED
HPIV2 RNA SPEC QL NAA+PROBE: NOT DETECTED
HPIV3 RNA NPH QL NAA+PROBE: NOT DETECTED
HPIV4 P GENE NPH QL NAA+PROBE: NOT DETECTED
IMM GRANULOCYTES # BLD AUTO: 0.02 10*3/MM3 (ref 0–0.05)
IMM GRANULOCYTES NFR BLD AUTO: 0.2 % (ref 0–0.5)
LYMPHOCYTES # BLD AUTO: 3.59 10*3/MM3 (ref 0.7–3.1)
LYMPHOCYTES NFR BLD AUTO: 42.3 % (ref 19.6–45.3)
M PNEUMO IGG SER IA-ACNC: NOT DETECTED
MCH RBC QN AUTO: 32.1 PG (ref 26.6–33)
MCHC RBC AUTO-ENTMCNC: 32.7 G/DL (ref 31.5–35.7)
MCV RBC AUTO: 98.1 FL (ref 79–97)
MONOCYTES # BLD AUTO: 0.65 10*3/MM3 (ref 0.1–0.9)
MONOCYTES NFR BLD AUTO: 7.7 % (ref 5–12)
NEUTROPHILS NFR BLD AUTO: 4.04 10*3/MM3 (ref 1.7–7)
NEUTROPHILS NFR BLD AUTO: 47.6 % (ref 42.7–76)
NRBC BLD AUTO-RTO: 0 /100 WBC (ref 0–0.2)
PLATELET # BLD AUTO: 429 10*3/MM3 (ref 140–450)
PMV BLD AUTO: 10.4 FL (ref 6–12)
RBC # BLD AUTO: 4.3 10*6/MM3 (ref 3.77–5.28)
RHINOVIRUS RNA SPEC NAA+PROBE: NOT DETECTED
RSV RNA NPH QL NAA+NON-PROBE: NOT DETECTED
SARS-COV-2 RNA NPH QL NAA+NON-PROBE: NOT DETECTED
WBC NRBC COR # BLD AUTO: 8.49 10*3/MM3 (ref 3.4–10.8)

## 2024-11-29 PROCEDURE — 0202U NFCT DS 22 TRGT SARS-COV-2: CPT | Performed by: INTERNAL MEDICINE

## 2024-11-29 PROCEDURE — 85025 COMPLETE CBC W/AUTO DIFF WBC: CPT | Performed by: INTERNAL MEDICINE

## 2024-12-23 ENCOUNTER — LAB REQUISITION (OUTPATIENT)
Dept: LAB | Facility: HOSPITAL | Age: 79
End: 2024-12-23
Payer: MEDICARE

## 2024-12-23 DIAGNOSIS — D72.829 ELEVATED WHITE BLOOD CELL COUNT, UNSPECIFIED: ICD-10-CM

## 2024-12-23 LAB
AMORPH URATE CRY URNS QL MICRO: ABNORMAL /HPF
BACTERIA UR QL AUTO: ABNORMAL /HPF
BILIRUB UR QL STRIP: NEGATIVE
CLARITY UR: ABNORMAL
COLOR UR: YELLOW
DEPRECATED RDW RBC AUTO: 54.4 FL (ref 37–54)
EOSINOPHIL # BLD MANUAL: 0.26 10*3/MM3 (ref 0–0.4)
EOSINOPHIL NFR BLD MANUAL: 2 % (ref 0.3–6.2)
ERYTHROCYTE [DISTWIDTH] IN BLOOD BY AUTOMATED COUNT: 15.1 % (ref 12.3–15.4)
GLUCOSE UR STRIP-MCNC: NEGATIVE MG/DL
HCT VFR BLD AUTO: 38.1 % (ref 34–46.6)
HGB BLD-MCNC: 12.5 G/DL (ref 12–15.9)
HGB UR QL STRIP.AUTO: NEGATIVE
HYALINE CASTS UR QL AUTO: ABNORMAL /LPF
KETONES UR QL STRIP: NEGATIVE
LEUKOCYTE ESTERASE UR QL STRIP.AUTO: ABNORMAL
LYMPHOCYTES # BLD MANUAL: 4.86 10*3/MM3 (ref 0.7–3.1)
LYMPHOCYTES NFR BLD MANUAL: 10 % (ref 5–12)
MCH RBC QN AUTO: 32.1 PG (ref 26.6–33)
MCHC RBC AUTO-ENTMCNC: 32.8 G/DL (ref 31.5–35.7)
MCV RBC AUTO: 97.7 FL (ref 79–97)
MONOCYTES # BLD: 1.28 10*3/MM3 (ref 0.1–0.9)
NEUTROPHILS # BLD AUTO: 6.4 10*3/MM3 (ref 1.7–7)
NEUTROPHILS NFR BLD MANUAL: 49 % (ref 42.7–76)
NEUTS BAND NFR BLD MANUAL: 1 % (ref 0–5)
NITRITE UR QL STRIP: NEGATIVE
PH UR STRIP.AUTO: 6 [PH] (ref 5–8)
PLATELET # BLD AUTO: 398 10*3/MM3 (ref 140–450)
PMV BLD AUTO: 11.5 FL (ref 6–12)
PROT UR QL STRIP: NEGATIVE
RBC # BLD AUTO: 3.9 10*6/MM3 (ref 3.77–5.28)
RBC # UR STRIP: ABNORMAL /HPF
RBC MORPH BLD: NORMAL
REF LAB TEST METHOD: ABNORMAL
SCAN SLIDE: NORMAL
SMALL PLATELETS BLD QL SMEAR: ADEQUATE
SP GR UR STRIP: 1.02 (ref 1–1.03)
SQUAMOUS #/AREA URNS HPF: ABNORMAL /HPF
UROBILINOGEN UR QL STRIP: ABNORMAL
VARIANT LYMPHS NFR BLD MANUAL: 38 % (ref 19.6–45.3)
WBC # UR STRIP: ABNORMAL /HPF
WBC NRBC COR # BLD AUTO: 12.8 10*3/MM3 (ref 3.4–10.8)

## 2024-12-23 PROCEDURE — 81001 URINALYSIS AUTO W/SCOPE: CPT | Performed by: INTERNAL MEDICINE

## 2024-12-23 PROCEDURE — 87086 URINE CULTURE/COLONY COUNT: CPT | Performed by: INTERNAL MEDICINE

## 2024-12-23 PROCEDURE — 85025 COMPLETE CBC W/AUTO DIFF WBC: CPT | Performed by: INTERNAL MEDICINE

## 2024-12-24 LAB — BACTERIA SPEC AEROBE CULT: NORMAL

## 2025-01-24 ENCOUNTER — LAB REQUISITION (OUTPATIENT)
Dept: LAB | Facility: HOSPITAL | Age: 80
End: 2025-01-24
Payer: MEDICARE

## 2025-01-24 DIAGNOSIS — D64.9 ANEMIA, UNSPECIFIED: ICD-10-CM

## 2025-01-24 DIAGNOSIS — R63.0 ANOREXIA: ICD-10-CM

## 2025-01-24 LAB
ALBUMIN SERPL-MCNC: 3.7 G/DL (ref 3.5–5.2)
ALBUMIN/GLOB SERPL: 1.3 G/DL
ALP SERPL-CCNC: 72 U/L (ref 39–117)
ALT SERPL W P-5'-P-CCNC: 7 U/L (ref 1–33)
ANION GAP SERPL CALCULATED.3IONS-SCNC: 14.5 MMOL/L (ref 5–15)
AST SERPL-CCNC: 23 U/L (ref 1–32)
BACTERIA UR QL AUTO: ABNORMAL /HPF
BASOPHILS # BLD AUTO: 0.05 10*3/MM3 (ref 0–0.2)
BASOPHILS NFR BLD AUTO: 0.6 % (ref 0–1.5)
BILIRUB SERPL-MCNC: 0.3 MG/DL (ref 0–1.2)
BILIRUB UR QL STRIP: NEGATIVE
BUN SERPL-MCNC: 15 MG/DL (ref 8–23)
BUN/CREAT SERPL: 22.7 (ref 7–25)
CALCIUM SPEC-SCNC: 9.3 MG/DL (ref 8.6–10.5)
CHLORIDE SERPL-SCNC: 107 MMOL/L (ref 98–107)
CLARITY UR: ABNORMAL
CO2 SERPL-SCNC: 17.5 MMOL/L (ref 22–29)
COLOR UR: YELLOW
CREAT SERPL-MCNC: 0.66 MG/DL (ref 0.57–1)
DEPRECATED RDW RBC AUTO: 47.3 FL (ref 37–54)
EGFRCR SERPLBLD CKD-EPI 2021: 88.8 ML/MIN/1.73
EOSINOPHIL # BLD AUTO: 0.07 10*3/MM3 (ref 0–0.4)
EOSINOPHIL NFR BLD AUTO: 0.8 % (ref 0.3–6.2)
ERYTHROCYTE [DISTWIDTH] IN BLOOD BY AUTOMATED COUNT: 13.2 % (ref 12.3–15.4)
GLOBULIN UR ELPH-MCNC: 2.9 GM/DL
GLUCOSE SERPL-MCNC: 266 MG/DL (ref 65–99)
GLUCOSE UR STRIP-MCNC: NEGATIVE MG/DL
HCT VFR BLD AUTO: 38.7 % (ref 34–46.6)
HGB BLD-MCNC: 12.8 G/DL (ref 12–15.9)
HGB UR QL STRIP.AUTO: NEGATIVE
HYALINE CASTS UR QL AUTO: ABNORMAL /LPF
IMM GRANULOCYTES # BLD AUTO: 0.02 10*3/MM3 (ref 0–0.05)
IMM GRANULOCYTES NFR BLD AUTO: 0.2 % (ref 0–0.5)
KETONES UR QL STRIP: ABNORMAL
LEUKOCYTE ESTERASE UR QL STRIP.AUTO: ABNORMAL
LYMPHOCYTES # BLD AUTO: 2.34 10*3/MM3 (ref 0.7–3.1)
LYMPHOCYTES NFR BLD AUTO: 27.6 % (ref 19.6–45.3)
MCH RBC QN AUTO: 31.9 PG (ref 26.6–33)
MCHC RBC AUTO-ENTMCNC: 33.1 G/DL (ref 31.5–35.7)
MCV RBC AUTO: 96.5 FL (ref 79–97)
MONOCYTES # BLD AUTO: 0.79 10*3/MM3 (ref 0.1–0.9)
MONOCYTES NFR BLD AUTO: 9.3 % (ref 5–12)
NEUTROPHILS NFR BLD AUTO: 5.2 10*3/MM3 (ref 1.7–7)
NEUTROPHILS NFR BLD AUTO: 61.5 % (ref 42.7–76)
NITRITE UR QL STRIP: NEGATIVE
NRBC BLD AUTO-RTO: 0 /100 WBC (ref 0–0.2)
PH UR STRIP.AUTO: 6.5 [PH] (ref 5–8)
PLATELET # BLD AUTO: 382 10*3/MM3 (ref 140–450)
PMV BLD AUTO: 11.3 FL (ref 6–12)
POTASSIUM SERPL-SCNC: 3.7 MMOL/L (ref 3.5–5.2)
PROT SERPL-MCNC: 6.6 G/DL (ref 6–8.5)
PROT UR QL STRIP: ABNORMAL
RBC # BLD AUTO: 4.01 10*6/MM3 (ref 3.77–5.28)
RBC # UR STRIP: ABNORMAL /HPF
REF LAB TEST METHOD: ABNORMAL
SODIUM SERPL-SCNC: 139 MMOL/L (ref 136–145)
SP GR UR STRIP: 1.02 (ref 1–1.03)
SQUAMOUS #/AREA URNS HPF: ABNORMAL /HPF
UROBILINOGEN UR QL STRIP: ABNORMAL
WBC # UR STRIP: ABNORMAL /HPF
WBC NRBC COR # BLD AUTO: 8.47 10*3/MM3 (ref 3.4–10.8)

## 2025-01-24 PROCEDURE — 87077 CULTURE AEROBIC IDENTIFY: CPT | Performed by: INTERNAL MEDICINE

## 2025-01-24 PROCEDURE — 87086 URINE CULTURE/COLONY COUNT: CPT | Performed by: INTERNAL MEDICINE

## 2025-01-24 PROCEDURE — 87186 SC STD MICRODIL/AGAR DIL: CPT | Performed by: INTERNAL MEDICINE

## 2025-01-24 PROCEDURE — 85025 COMPLETE CBC W/AUTO DIFF WBC: CPT | Performed by: INTERNAL MEDICINE

## 2025-01-24 PROCEDURE — 80053 COMPREHEN METABOLIC PANEL: CPT | Performed by: INTERNAL MEDICINE

## 2025-01-24 PROCEDURE — 81001 URINALYSIS AUTO W/SCOPE: CPT | Performed by: INTERNAL MEDICINE

## 2025-01-27 LAB — BACTERIA SPEC AEROBE CULT: ABNORMAL

## 2025-03-01 ENCOUNTER — LAB REQUISITION (OUTPATIENT)
Dept: LAB | Facility: HOSPITAL | Age: 80
End: 2025-03-01
Payer: MEDICARE

## 2025-03-01 DIAGNOSIS — R05.9 COUGH, UNSPECIFIED: ICD-10-CM

## 2025-03-01 LAB
B PARAPERT DNA SPEC QL NAA+PROBE: NOT DETECTED
B PERT DNA SPEC QL NAA+PROBE: NOT DETECTED
C PNEUM DNA NPH QL NAA+NON-PROBE: NOT DETECTED
FLUAV SUBTYP SPEC NAA+PROBE: NOT DETECTED
FLUBV RNA ISLT QL NAA+PROBE: NOT DETECTED
HADV DNA SPEC NAA+PROBE: NOT DETECTED
HCOV 229E RNA SPEC QL NAA+PROBE: NOT DETECTED
HCOV HKU1 RNA SPEC QL NAA+PROBE: NOT DETECTED
HCOV NL63 RNA SPEC QL NAA+PROBE: NOT DETECTED
HCOV OC43 RNA SPEC QL NAA+PROBE: DETECTED
HMPV RNA NPH QL NAA+NON-PROBE: NOT DETECTED
HPIV1 RNA ISLT QL NAA+PROBE: NOT DETECTED
HPIV2 RNA SPEC QL NAA+PROBE: NOT DETECTED
HPIV3 RNA NPH QL NAA+PROBE: NOT DETECTED
HPIV4 P GENE NPH QL NAA+PROBE: NOT DETECTED
M PNEUMO IGG SER IA-ACNC: NOT DETECTED
RHINOVIRUS RNA SPEC NAA+PROBE: NOT DETECTED
RSV RNA NPH QL NAA+NON-PROBE: NOT DETECTED
SARS-COV-2 RNA RESP QL NAA+PROBE: NOT DETECTED

## 2025-03-01 PROCEDURE — 0202U NFCT DS 22 TRGT SARS-COV-2: CPT | Performed by: INTERNAL MEDICINE

## 2025-05-08 ENCOUNTER — HOSPITAL ENCOUNTER (EMERGENCY)
Facility: HOSPITAL | Age: 80
Discharge: HOME | End: 2025-05-09
Payer: MEDICARE

## 2025-05-08 ENCOUNTER — APPOINTMENT (OUTPATIENT)
Dept: CT IMAGING | Facility: HOSPITAL | Age: 80
End: 2025-05-08
Payer: MEDICARE

## 2025-05-08 DIAGNOSIS — N39.0 UTI (URINARY TRACT INFECTION), BACTERIAL: Primary | ICD-10-CM

## 2025-05-08 DIAGNOSIS — R41.0 DELIRIUM: ICD-10-CM

## 2025-05-08 DIAGNOSIS — A49.9 UTI (URINARY TRACT INFECTION), BACTERIAL: Primary | ICD-10-CM

## 2025-05-08 LAB
ALBUMIN SERPL-MCNC: 3.6 G/DL (ref 3.5–5.2)
ALBUMIN/GLOB SERPL: 1 G/DL
ALP SERPL-CCNC: 63 U/L (ref 39–117)
ALT SERPL W P-5'-P-CCNC: <5 U/L (ref 1–33)
AMPHET+METHAMPHET UR QL: NEGATIVE
AMPHETAMINES UR QL: NEGATIVE
ANION GAP SERPL CALCULATED.3IONS-SCNC: 10.9 MMOL/L (ref 5–15)
ANISOCYTOSIS BLD QL: ABNORMAL
AST SERPL-CCNC: 15 U/L (ref 1–32)
BACTERIA UR QL AUTO: ABNORMAL /HPF
BARBITURATES UR QL SCN: NEGATIVE
BASOPHILS # BLD MANUAL: 0.12 10*3/MM3 (ref 0–0.2)
BASOPHILS NFR BLD MANUAL: 1 % (ref 0–1.5)
BENZODIAZ UR QL SCN: POSITIVE
BILIRUB SERPL-MCNC: 0.3 MG/DL (ref 0–1.2)
BILIRUB UR QL STRIP: NEGATIVE
BUN SERPL-MCNC: 17 MG/DL (ref 8–23)
BUN/CREAT SERPL: 24.6 (ref 7–25)
BUPRENORPHINE SERPL-MCNC: NEGATIVE NG/ML
CALCIUM SPEC-SCNC: 9.4 MG/DL (ref 8.6–10.5)
CANNABINOIDS SERPL QL: NEGATIVE
CHLORIDE SERPL-SCNC: 111 MMOL/L (ref 98–107)
CLARITY UR: ABNORMAL
CO2 SERPL-SCNC: 20.1 MMOL/L (ref 22–29)
COCAINE UR QL: NEGATIVE
COLOR UR: ABNORMAL
CREAT SERPL-MCNC: 0.69 MG/DL (ref 0.57–1)
D-LACTATE SERPL-SCNC: 0.7 MMOL/L (ref 0.5–2)
DEPRECATED RDW RBC AUTO: 56.9 FL (ref 37–54)
EGFRCR SERPLBLD CKD-EPI 2021: 87.9 ML/MIN/1.73
EOSINOPHIL # BLD MANUAL: 0.62 10*3/MM3 (ref 0–0.4)
EOSINOPHIL NFR BLD MANUAL: 5 % (ref 0.3–6.2)
ERYTHROCYTE [DISTWIDTH] IN BLOOD BY AUTOMATED COUNT: 16.5 % (ref 12.3–15.4)
FENTANYL UR-MCNC: NEGATIVE NG/ML
GLOBULIN UR ELPH-MCNC: 3.5 GM/DL
GLUCOSE SERPL-MCNC: 106 MG/DL (ref 65–99)
GLUCOSE UR STRIP-MCNC: NEGATIVE MG/DL
HCT VFR BLD AUTO: 38.4 % (ref 34–46.6)
HGB BLD-MCNC: 12.5 G/DL (ref 12–15.9)
HGB UR QL STRIP.AUTO: ABNORMAL
HOLD SPECIMEN: NORMAL
HOLD SPECIMEN: NORMAL
HYALINE CASTS UR QL AUTO: ABNORMAL /LPF
KETONES UR QL STRIP: ABNORMAL
LEUKOCYTE ESTERASE UR QL STRIP.AUTO: ABNORMAL
LYMPHOCYTES # BLD MANUAL: 5.47 10*3/MM3 (ref 0.7–3.1)
LYMPHOCYTES NFR BLD MANUAL: 7 % (ref 5–12)
MCH RBC QN AUTO: 30.6 PG (ref 26.6–33)
MCHC RBC AUTO-ENTMCNC: 32.6 G/DL (ref 31.5–35.7)
MCV RBC AUTO: 93.9 FL (ref 79–97)
METHADONE UR QL SCN: NEGATIVE
MONOCYTES # BLD: 0.87 10*3/MM3 (ref 0.1–0.9)
NEUTROPHILS # BLD AUTO: 5.34 10*3/MM3 (ref 1.7–7)
NEUTROPHILS NFR BLD MANUAL: 43 % (ref 42.7–76)
NITRITE UR QL STRIP: POSITIVE
OPIATES UR QL: NEGATIVE
OXYCODONE UR QL SCN: NEGATIVE
PCP UR QL SCN: NEGATIVE
PH UR STRIP.AUTO: <=5 [PH] (ref 5–8)
PLAT MORPH BLD: NORMAL
PLATELET # BLD AUTO: 368 10*3/MM3 (ref 140–450)
PMV BLD AUTO: 12.4 FL (ref 6–12)
POTASSIUM SERPL-SCNC: 3.6 MMOL/L (ref 3.5–5.2)
PROT SERPL-MCNC: 7.1 G/DL (ref 6–8.5)
PROT UR QL STRIP: ABNORMAL
RBC # BLD AUTO: 4.09 10*6/MM3 (ref 3.77–5.28)
RBC # UR STRIP: ABNORMAL /HPF
REF LAB TEST METHOD: ABNORMAL
SODIUM SERPL-SCNC: 142 MMOL/L (ref 136–145)
SP GR UR STRIP: 1.02 (ref 1–1.03)
SQUAMOUS #/AREA URNS HPF: ABNORMAL /HPF
TRICYCLICS UR QL SCN: NEGATIVE
UROBILINOGEN UR QL STRIP: ABNORMAL
VARIANT LYMPHS NFR BLD MANUAL: 44 % (ref 19.6–45.3)
WBC # UR STRIP: ABNORMAL /HPF
WBC NRBC COR # BLD AUTO: 12.43 10*3/MM3 (ref 3.4–10.8)
WHOLE BLOOD HOLD COAG: NORMAL
WHOLE BLOOD HOLD SPECIMEN: NORMAL

## 2025-05-08 PROCEDURE — 99284 EMERGENCY DEPT VISIT MOD MDM: CPT

## 2025-05-08 PROCEDURE — P9612 CATHETERIZE FOR URINE SPEC: HCPCS

## 2025-05-08 PROCEDURE — 85025 COMPLETE CBC W/AUTO DIFF WBC: CPT

## 2025-05-08 PROCEDURE — 70450 CT HEAD/BRAIN W/O DYE: CPT

## 2025-05-08 PROCEDURE — 81001 URINALYSIS AUTO W/SCOPE: CPT

## 2025-05-08 PROCEDURE — 87186 SC STD MICRODIL/AGAR DIL: CPT

## 2025-05-08 PROCEDURE — 87086 URINE CULTURE/COLONY COUNT: CPT

## 2025-05-08 PROCEDURE — 96365 THER/PROPH/DIAG IV INF INIT: CPT

## 2025-05-08 PROCEDURE — 70450 CT HEAD/BRAIN W/O DYE: CPT | Performed by: RADIOLOGY

## 2025-05-08 PROCEDURE — 87088 URINE BACTERIA CULTURE: CPT

## 2025-05-08 PROCEDURE — 83605 ASSAY OF LACTIC ACID: CPT

## 2025-05-08 PROCEDURE — 80053 COMPREHEN METABOLIC PANEL: CPT

## 2025-05-08 PROCEDURE — 25010000002 CEFTRIAXONE PER 250 MG

## 2025-05-08 PROCEDURE — 36415 COLL VENOUS BLD VENIPUNCTURE: CPT

## 2025-05-08 PROCEDURE — 85007 BL SMEAR W/DIFF WBC COUNT: CPT

## 2025-05-08 PROCEDURE — 80307 DRUG TEST PRSMV CHEM ANLYZR: CPT

## 2025-05-08 RX ORDER — CEFDINIR 300 MG/1
300 CAPSULE ORAL 2 TIMES DAILY
Qty: 14 CAPSULE | Refills: 0 | Status: SHIPPED | OUTPATIENT
Start: 2025-05-08 | End: 2025-05-15

## 2025-05-08 RX ADMIN — SODIUM CHLORIDE 2000 MG: 9 INJECTION, SOLUTION INTRAVENOUS at 20:09

## 2025-05-09 VITALS
OXYGEN SATURATION: 94 % | HEART RATE: 83 BPM | HEIGHT: 60 IN | SYSTOLIC BLOOD PRESSURE: 150 MMHG | RESPIRATION RATE: 18 BRPM | BODY MASS INDEX: 20.6 KG/M2 | WEIGHT: 104.94 LBS | DIASTOLIC BLOOD PRESSURE: 83 MMHG | TEMPERATURE: 98.7 F

## 2025-05-09 NOTE — ED PROVIDER NOTES
"Subjective   History of Present Illness  79-year-old female with history of COPD, dementia, frequent UTIs presents to the ED due to confusion.  Per the family the patient was more confused and less responsive today while in the nursing home.  The EMS arrived they gave her naloxone and at that point she became more awake.  At bedside she is back to baseline per the family.  She is answering questions and is awake and alert.      Review of Systems   Constitutional: Negative.  Negative for fever.   HENT: Negative.     Respiratory: Negative.     Cardiovascular: Negative.  Negative for chest pain.   Gastrointestinal: Negative.  Negative for abdominal pain.   Endocrine: Negative.    Genitourinary: Negative.  Negative for dysuria.   Skin: Negative.    Neurological: Negative.    Psychiatric/Behavioral:  Positive for behavioral problems and confusion.    All other systems reviewed and are negative.      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    History of blood clots     History of transfusion     HTN (hypertension)     Myelofibrosis     Bone marrow    Panic attacks     Psychiatric illness        Allergies   Allergen Reactions    Ciprofloxacin Unknown (See Comments)     \"I don't know.\"    Lamotrigine Unknown (See Comments)     Pt unable to provide info    Risperidone And Related Unknown (See Comments)     Pt unable to provide info      Seroquel [Quetiapine Fumarate] Unknown (See Comments)     Pt unable to provide info      Sulfa Antibiotics Unknown (See Comments)     Pt unable to provide info    Zoloft [Sertraline Hcl] Unknown (See Comments)     Pt unable to provide info    Penicillins Rash       Past Surgical History:   Procedure Laterality Date    ABDOMINAL SURGERY      BONE MARROW BIOPSY      "  SECTION      CHOLECYSTECTOMY N/A 2021    Procedure: CHOLECYSTECTOMY LAPAROSCOPIC;  Surgeon: Michel Laws MD;  Location:  [facility];  Service: General;  Laterality: N/A;    COLON RESECTION      due to tumor which turned out to be benign. pt unable to remember date.     COLON SURGERY      COLONOSCOPY      COLONOSCOPY N/A 2021    Procedure: COLONOSCOPY;  Surgeon: Michel Laws MD;  [facility];  Service: General;  Laterality: N/A;    ENDOSCOPY      ENDOSCOPY N/A 2021    Procedure: ESOPHAGOGASTRODUODENOSCOPY;  Surgeon: Michel Laws MD;  [facility];  Service: General;  Laterality: N/A;    SAMAN FILTER INSERTION JUGULAR      SKIN CANCER EXCISION      SMALL INTESTINE SURGERY      SPLENECTOMY      SPLENECTOMY, PARTIAL      as part of the bone marrow transplant.     TRANSURETHRAL RESECTION OF BLADDER TUMOR         Family History   Problem Relation Age of Onset    Cancer Father     Cancer Mother     Depression Mother     Anxiety disorder Mother        Social History     Socioeconomic History    Marital status:    Tobacco Use    Smoking status: Former    Smokeless tobacco: Never    Tobacco comments:     quit approximately 30 years ago   Vaping Use    Vaping status: Never Used   Substance and Sexual Activity    Alcohol use: No    Drug use: No    Sexual activity: Never           Objective   Physical Exam  Vitals and nursing note reviewed.   Constitutional:       General: She is not in acute distress.     Appearance: She is well-developed. She is not diaphoretic.   HENT:      Head: Normocephalic and atraumatic.      Right Ear: External ear normal.      Left Ear: External ear normal.      Nose: Nose normal.   Eyes:      Conjunctiva/sclera: Conjunctivae normal.      Pupils: Pupils are equal, round, and reactive to light.   Neck:      Vascular: No JVD.      Trachea: No tracheal deviation.   Cardiovascular:      Rate and Rhythm: Normal rate and regular rhythm.      Heart  sounds: Normal heart sounds. No murmur heard.  Pulmonary:      Effort: Pulmonary effort is normal. No respiratory distress.      Breath sounds: Normal breath sounds. No wheezing.   Abdominal:      General: Bowel sounds are normal.      Palpations: Abdomen is soft.      Tenderness: There is no abdominal tenderness.   Musculoskeletal:         General: No deformity. Normal range of motion.      Cervical back: Normal range of motion and neck supple.   Skin:     General: Skin is warm and dry.      Coloration: Skin is not pale.      Findings: No erythema or rash.   Neurological:      Mental Status: Mental status is at baseline. She is confused.      Cranial Nerves: No cranial nerve deficit.   Psychiatric:         Thought Content: Thought content normal.         Cognition and Memory: Cognition is impaired. Memory is impaired.         Procedures           ED Course                                                       Medical Decision Making  Patient was found have significant UTI.  Patient started on Rocephin.  Her lab work is otherwise unremarkable.  We discussed all these findings with patient's family at bedside.  Recommended outpatient antibiotics at this time with outpatient follow-up.  ED precautions were given.  All questions answered.  Family is in agreement plan of care.    Problems Addressed:  Delirium: complicated acute illness or injury  UTI (urinary tract infection), bacterial: complicated acute illness or injury    Amount and/or Complexity of Data Reviewed  Labs: ordered.  Radiology: ordered.    Risk  Prescription drug management.        Final diagnoses:   UTI (urinary tract infection), bacterial   Delirium       ED Disposition  ED Disposition       ED Disposition   Discharge    Condition   Stable    Comment   --               Genie Suarez MD  [address] [place] [id number] [phone]    Schedule an appointment as soon as possible for a visit in 3 days      [facility] EMERGENCY  DEPARTMENT  [address] [facility] [id number]  [phone]  Go to   If symptoms worsen         Medication List        New Prescriptions      cefdinir 300 MG capsule  Commonly known as: OMNICEF  Take 1 capsule by mouth 2 (Two) Times a Day for 7 days.               Where to Get Your Medications        These medications were sent to [facility] - [place], [place] - [address] - [phone]  - [phone]   [address] [place] [id number]      Phone: [phone]   cefdinir 300 MG capsule            Cisco Hurley, DO  05/08/25 2147       Cisco Hurley, DO  05/08/25 2148

## 2025-05-10 LAB — BACTERIA SPEC AEROBE CULT: ABNORMAL

## 2025-08-14 ENCOUNTER — LAB REQUISITION (OUTPATIENT)
Dept: LAB | Facility: HOSPITAL | Age: 80
End: 2025-08-14
Payer: MEDICARE

## 2025-08-14 DIAGNOSIS — R41.82 ALTERED MENTAL STATUS, UNSPECIFIED: ICD-10-CM

## 2025-08-14 LAB — AMMONIA BLD-SCNC: 28 UMOL/L (ref 11–51)

## 2025-08-14 PROCEDURE — 82140 ASSAY OF AMMONIA: CPT | Performed by: INTERNAL MEDICINE

## (undated) DEVICE — SUT VIC 2/0 UR6 27IN J602H

## (undated) DEVICE — ENDOPATH ELECTROSURGERY PROBE PLUS II PISTOL HAND CONTROL PISTOL GRIP HANDLES WITH SUCTION AND IRRRIGATION FOR HAND CONTROL MONOPOLAR ELCTROSURGERY USE ONLY WITH PROBE PLUS II SHAFTS: Brand: ENDOPATH

## (undated) DEVICE — COR LAP CHOLE: Brand: MEDLINE INDUSTRIES, INC.

## (undated) DEVICE — APPL CHLORAPREP HI/LITE 26ML ORNG

## (undated) DEVICE — TROCAR: Brand: KII FIOS FIRST ENTRY

## (undated) DEVICE — ENDOGATOR AUXILIARY WATER JET CONNECTOR: Brand: ENDOGATOR

## (undated) DEVICE — JACKSON-PRATT 100CC BULB RESERVOIR: Brand: CARDINAL HEALTH

## (undated) DEVICE — ENDOCUT SCISSOR TIP, DISPOSABLE: Brand: RENEW

## (undated) DEVICE — Device

## (undated) DEVICE — THE BITE BLOCK MAXI, LATEX FREE STRAP IS USED TO PROTECT THE ENDOSCOPE INSERTION TUBE FROM BEING BITTEN BY THE PATIENT.

## (undated) DEVICE — CYSTO/BLADDER IRRIGATION SET, REGULATING CLAMP

## (undated) DEVICE — CONN Y IRR DISP 1P/U

## (undated) DEVICE — DRAPE,UTILTY,TAPE,15X26, 4EA/PK: Brand: MEDLINE

## (undated) DEVICE — HOLDER: Brand: DEROYAL

## (undated) DEVICE — ANTIBACTERIAL UNDYED BRAIDED (POLYGLACTIN 910), SYNTHETIC ABSORBABLE SUTURE: Brand: COATED VICRYL

## (undated) DEVICE — DRN WND HUBLSS FLUT FULL PERF SIL10MM

## (undated) DEVICE — TROCAR: Brand: KII® SLEEVE

## (undated) DEVICE — ENDOPATH XCEL BLADELESS TROCARS WITH STABILITY SLEEVES: Brand: ENDOPATH XCEL

## (undated) DEVICE — [HIGH FLOW INSUFFLATOR,  DO NOT USE IF PACKAGE IS DAMAGED,  KEEP DRY,  KEEP AWAY FROM SUNLIGHT,  PROTECT FROM HEAT AND RADIOACTIVE SOURCES.]: Brand: PNEUMOSURE

## (undated) DEVICE — ENDOGATOR TUBING FOR ENDOGATOR EGP-100 IRRIGATION PUMP,OLYMPUS OFP PUMP, OLYMPUS AFU-100 PUMP AND ERBE EIP2 PUMP: Brand: ENDOGATOR

## (undated) DEVICE — PATIENT RETURN ELECTRODE, SINGLE-USE, CONTACT QUALITY MONITORING, ADULT, WITH 9FT CORD, FOR PATIENTS WEIGING OVER 33LBS. (15KG): Brand: MEGADYNE

## (undated) DEVICE — ENDOPATH ELECTROSURGERY PROBE PLUS II 5MM RIGHT ANGLE MONOPOLAR ELECTROSURGERY SUCTION AND IRRRIGATION SHAFTS WITH RIGHT ANGLE ELECTRODE - USE ONLY WITH PROBE PLUS II HANDLES: Brand: ENDOPATH

## (undated) DEVICE — GLV SURG PREMIERPRO MIC LTX PF SZ7.5 BRN

## (undated) DEVICE — SYR LUERLOK 30CC